# Patient Record
Sex: MALE | Race: BLACK OR AFRICAN AMERICAN | Employment: FULL TIME | URBAN - METROPOLITAN AREA
[De-identification: names, ages, dates, MRNs, and addresses within clinical notes are randomized per-mention and may not be internally consistent; named-entity substitution may affect disease eponyms.]

---

## 2017-11-09 ENCOUNTER — APPOINTMENT (OUTPATIENT)
Dept: GENERAL RADIOLOGY | Age: 37
End: 2017-11-09
Attending: NURSE PRACTITIONER
Payer: COMMERCIAL

## 2017-11-09 ENCOUNTER — HOSPITAL ENCOUNTER (EMERGENCY)
Age: 37
Discharge: HOME | End: 2017-11-09
Attending: EMERGENCY MEDICINE
Payer: COMMERCIAL

## 2017-11-09 VITALS
WEIGHT: 200 LBS | OXYGEN SATURATION: 97 % | TEMPERATURE: 97.6 F | DIASTOLIC BLOOD PRESSURE: 58 MMHG | HEART RATE: 59 BPM | SYSTOLIC BLOOD PRESSURE: 106 MMHG | HEIGHT: 76 IN | RESPIRATION RATE: 16 BRPM | BODY MASS INDEX: 24.36 KG/M2

## 2017-11-09 DIAGNOSIS — S62.654A CLOSED NONDISPLACED FRACTURE OF MIDDLE PHALANX OF RIGHT RING FINGER, INITIAL ENCOUNTER: Primary | ICD-10-CM

## 2017-11-09 PROCEDURE — 77030008303 HC SPLNT FNGR ALUM CONC -A

## 2017-11-09 PROCEDURE — 99282 EMERGENCY DEPT VISIT SF MDM: CPT | Performed by: EMERGENCY MEDICINE

## 2017-11-09 PROCEDURE — 73130 X-RAY EXAM OF HAND: CPT

## 2017-11-09 PROCEDURE — 75810000053 HC SPLINT APPLICATION: Performed by: EMERGENCY MEDICINE

## 2017-11-09 RX ORDER — TRAMADOL HYDROCHLORIDE 50 MG/1
50 TABLET ORAL
Qty: 24 TAB | Refills: 0 | Status: SHIPPED | OUTPATIENT
Start: 2017-11-09 | End: 2021-03-23 | Stop reason: ALTCHOICE

## 2017-11-09 NOTE — DISCHARGE INSTRUCTIONS
Finger Fracture: Care Instructions  Your Care Instructions    Breaks in the bones of the finger usually heal well in about 3 to 4 weeks. The pain and swelling from a broken finger can last for weeks. But it should steadily improve, starting a few days after you break it. It is very important that you wear and take care of the cast or splint exactly as your doctor tells you to so that your finger heals properly and does not end up crooked. Wearing a splint may interfere with your normal activities. Ask for help with daily tasks if you need it. You heal best when you take good care of yourself. Eat a variety of healthy foods, and don't smoke. Follow-up care is a key part of your treatment and safety. Be sure to make and go to all appointments, and call your doctor if you are having problems. It's also a good idea to know your test results and keep a list of the medicines you take. How can you care for yourself at home? · If your doctor put a splint on your finger, wear the splint exactly as directed. Do not remove it until your doctor says that you can. · Keep your hand raised above the level of your heart as much as you can. This will help reduce swelling. · Put ice or a cold pack on your finger for 10 to 20 minutes at a time. Try to do this every 1 to 2 hours for the next 3 days (when you are awake) or until the swelling goes down. Put a thin cloth between the ice and your skin. Keep the splint dry. · Be safe with medicines. Take pain medicines exactly as directed. ¨ If the doctor gave you a prescription medicine for pain, take it as prescribed. ¨ If you are not taking a prescription pain medicine, ask your doctor if you can take an over-the-counter medicine. When should you call for help? Call 911 anytime you think you may need emergency care. For example, call if:  ? · Your finger is cool or pale or changes color.    ?Call your doctor now or seek immediate medical care if:  ? · Your pain gets much worse.   ? · You have tingling, weakness, or numbness in your finger. ? · You have signs of infection, such as:  ¨ Increased pain, swelling, warmth, or redness. ¨ Red streaks leading from the area. ¨ Pus draining from the area. ¨ Swollen lymph nodes in your neck, armpits, or groin. ¨ A fever. ? Watch closely for changes in your health, and be sure to contact your doctor if:  ? · Your finger is not steadily improving. Where can you learn more? Go to http://jaswinder-desi.info/. Enter M058 in the search box to learn more about \"Finger Fracture: Care Instructions. \"  Current as of: March 21, 2017  Content Version: 11.4  © 7765-1844 [employer]. Care instructions adapted under license by [employer] (which disclaims liability or warranty for this information). If you have questions about a medical condition or this instruction, always ask your healthcare professional. [place] [address] any warranty or liability for your use of this information.

## 2017-11-09 NOTE — LETTER
[facility] EMERGENCY DEPT 
[facility] [address] [place] [id number] 
[phone] Work/School Note Date: 11/9/2017 To Whom It May concern: 
 
Otoniel Cochran was seen and treated today in the emergency room by the following provider(s): 
Attending Provider: Stefany Zaman MD 
Nurse Practitioner: Jacy Cr NP. Otoniel Cochran may return to work on tomorrow. Sincerely, Jacy Cr NP

## 2017-11-09 NOTE — ED PROVIDER NOTES
HPI Comments: 41 y/o m to ed with right 4th finger swelling and pain. Says he dropped a box of rocks on it about one week ago, then he had several other injuries that he will not disclose. Now with continued swelling and pain. Maintains full rom, cft intact. No other complaints    Patient is a 42 y.o. male presenting with finger swelling. The history is provided by the patient. No  was used. Finger Swelling    This is a recurrent problem. The current episode started more than 1 week ago. The problem occurs constantly. The problem has not changed since onset. The pain is present in the right fingers. The quality of the pain is described as aching. The pain is mild. Pertinent negatives include no numbness, full range of motion, no stiffness, no tingling, no itching, no back pain and no neck pain. There has been a history of trauma. Past Medical History:   Diagnosis Date    Chlamydia     Hiatal hernia     High risk sexual behavior     Urethritis        Past Surgical History:   Procedure Laterality Date    HX HERNIA REPAIR           Family History:   Problem Relation Age of Onset    Diabetes Mother     Hypertension Mother    [facility] Glaucoma Mother        Social History     Social History    Marital status: SINGLE     Spouse name: N/A    Number of children: N/A    Years of education: N/A     Occupational History    Not on file. Social History Main Topics    Smoking status: Current Some Day Smoker    Smokeless tobacco: Never Used    Alcohol use No    Drug use: No    Sexual activity: Not on file     Other Topics Concern    Not on file     Social History Narrative         ALLERGIES: Review of patient's allergies indicates no known allergies. Review of Systems   Constitutional: Negative for chills and fever. HENT: Negative for facial swelling and mouth sores. Eyes: Negative for discharge and redness. Respiratory: Negative for cough and shortness of breath. Cardiovascular: Negative for chest pain and palpitations. Gastrointestinal: Negative for nausea and vomiting. Endocrine: Negative for cold intolerance and heat intolerance. Genitourinary: Negative for difficulty urinating and flank pain. Musculoskeletal: Positive for joint swelling and myalgias. Negative for back pain, gait problem, neck pain and stiffness. Skin: Negative for color change, itching, pallor and wound. Neurological: Negative for dizziness, tingling, weakness and numbness. Psychiatric/Behavioral: Negative for confusion and decreased concentration. Vitals:    11/09/17 1406 11/09/17 1415   BP: 106/58    Pulse: (!) 59    Resp: 16    Temp: 97.6 °F (36.4 °C)    SpO2: 97% 97%   Weight: 90.7 kg (200 lb)    Height: 6' 4\" (1.93 m)             Physical Exam   Constitutional: He is oriented to person, place, and time. He appears well-developed and well-nourished. No distress. HENT:   Head: Normocephalic and atraumatic. Right Ear: External ear normal.   Left Ear: External ear normal.   Nose: Nose normal.   Eyes: Conjunctivae and EOM are normal. Pupils are equal, round, and reactive to light. Neck: Normal range of motion. Neck supple. Cardiovascular: Normal rate, regular rhythm and normal heart sounds. Pulmonary/Chest: Effort normal and breath sounds normal. No respiratory distress. He has no wheezes. Abdominal: Soft. Bowel sounds are normal. He exhibits no distension. There is no tenderness. Musculoskeletal: Normal range of motion. He exhibits edema and tenderness. Hands:  Neurological: He is alert and oriented to person, place, and time. No cranial nerve deficit. Coordination normal.   Skin: Skin is warm and dry. No rash noted. Psychiatric: He has a normal mood and affect. His behavior is normal. Judgment and thought content normal.   Nursing note and vitals reviewed.        MDM  Number of Diagnoses or Management Options  Diagnosis management comments: 41 y/o m to ed with right 4th finger swelling and pain. Says he dropped a box of rocks on it about one week ago, then he had several other injuries that he will not disclose. Now with continued swelling and pain. Maintains full rom, cft intact. No other complaints    Will xray  2:45 PM  fsx noted middle phalanx of fourth finger.   Will splint, home with family to follow with ortho       Amount and/or Complexity of Data Reviewed  Tests in the radiology section of CPT®: ordered and reviewed    Risk of Complications, Morbidity, and/or Mortality  Presenting problems: minimal  Diagnostic procedures: minimal  Management options: minimal    Patient Progress  Patient progress: stable    ED Course       Procedures

## 2018-12-13 ENCOUNTER — ANESTHESIA EVENT (OUTPATIENT)
Dept: SURGERY | Age: 38
End: 2018-12-13
Payer: OTHER MISCELLANEOUS

## 2018-12-14 ENCOUNTER — ANESTHESIA (OUTPATIENT)
Dept: SURGERY | Age: 38
End: 2018-12-14
Payer: OTHER MISCELLANEOUS

## 2018-12-14 ENCOUNTER — HOSPITAL ENCOUNTER (OUTPATIENT)
Age: 38
Setting detail: OUTPATIENT SURGERY
Discharge: HOME | End: 2018-12-14
Attending: ORTHOPAEDIC SURGERY | Admitting: ORTHOPAEDIC SURGERY
Payer: OTHER MISCELLANEOUS

## 2018-12-14 VITALS
DIASTOLIC BLOOD PRESSURE: 82 MMHG | RESPIRATION RATE: 16 BRPM | HEART RATE: 68 BPM | TEMPERATURE: 97.6 F | SYSTOLIC BLOOD PRESSURE: 148 MMHG | OXYGEN SATURATION: 98 %

## 2018-12-14 PROCEDURE — 74011250636 HC RX REV CODE- 250/636

## 2018-12-14 PROCEDURE — C1713 ANCHOR/SCREW BN/BN,TIS/BN: HCPCS | Performed by: ORTHOPAEDIC SURGERY

## 2018-12-14 PROCEDURE — 77030004453 HC BUR SHV STRY -B: Performed by: ORTHOPAEDIC SURGERY

## 2018-12-14 PROCEDURE — 76210000020 HC REC RM PH II FIRST 0.5 HR: Performed by: ORTHOPAEDIC SURGERY

## 2018-12-14 PROCEDURE — 76060000033 HC ANESTHESIA 1 TO 1.5 HR: Performed by: ORTHOPAEDIC SURGERY

## 2018-12-14 PROCEDURE — 74011250637 HC RX REV CODE- 250/637: Performed by: ANESTHESIOLOGY

## 2018-12-14 PROCEDURE — 76010000160 HC OR TIME 0.5 TO 1 HR INTENSV-TIER 1: Performed by: ORTHOPAEDIC SURGERY

## 2018-12-14 PROCEDURE — 77030019605: Performed by: ORTHOPAEDIC SURGERY

## 2018-12-14 PROCEDURE — 77030002933 HC SUT MCRYL J&J -A: Performed by: ORTHOPAEDIC SURGERY

## 2018-12-14 PROCEDURE — 77030003666 HC NDL SPINAL BD -A: Performed by: ORTHOPAEDIC SURGERY

## 2018-12-14 PROCEDURE — 77030010427: Performed by: ORTHOPAEDIC SURGERY

## 2018-12-14 PROCEDURE — 77030010509 HC AIRWY LMA MSK TELE -A: Performed by: ANESTHESIOLOGY

## 2018-12-14 PROCEDURE — 76210000063 HC OR PH I REC FIRST 0.5 HR: Performed by: ORTHOPAEDIC SURGERY

## 2018-12-14 PROCEDURE — 74011250636 HC RX REV CODE- 250/636: Performed by: ANESTHESIOLOGY

## 2018-12-14 PROCEDURE — 76010010054 HC POST OP PAIN BLOCK: Performed by: ORTHOPAEDIC SURGERY

## 2018-12-14 PROCEDURE — 77030027384 HC PRB ARTHSCP SERFAS STRY -C: Performed by: ORTHOPAEDIC SURGERY

## 2018-12-14 PROCEDURE — 77030034139 HC NDL ENDOSC TRUPASS SGL S&N -C: Performed by: ORTHOPAEDIC SURGERY

## 2018-12-14 PROCEDURE — 77030033269 HC SLV COMPR SCD KNE2 CARD -B: Performed by: ORTHOPAEDIC SURGERY

## 2018-12-14 PROCEDURE — 77030033005 HC TBNG ARTHSC PMP STRY -B: Performed by: ORTHOPAEDIC SURGERY

## 2018-12-14 PROCEDURE — 76942 ECHO GUIDE FOR BIOPSY: CPT | Performed by: ORTHOPAEDIC SURGERY

## 2018-12-14 PROCEDURE — 77030010430: Performed by: ORTHOPAEDIC SURGERY

## 2018-12-14 PROCEDURE — 77030019940 HC BLNKT HYPOTHRM STRY -B: Performed by: ANESTHESIOLOGY

## 2018-12-14 PROCEDURE — 77030018836 HC SOL IRR NACL ICUM -A: Performed by: ORTHOPAEDIC SURGERY

## 2018-12-14 PROCEDURE — 74011250636 HC RX REV CODE- 250/636: Performed by: PHYSICIAN ASSISTANT

## 2018-12-14 PROCEDURE — 74011000250 HC RX REV CODE- 250

## 2018-12-14 PROCEDURE — 77030033073 HC TBNG ARTHSC PMP OUTFLO STRY -B: Performed by: ORTHOPAEDIC SURGERY

## 2018-12-14 PROCEDURE — 77030019908 HC STETH ESOPH SIMS -A: Performed by: ANESTHESIOLOGY

## 2018-12-14 PROCEDURE — 77030006668 HC BLD SHV MENSCS STRY -B: Performed by: ORTHOPAEDIC SURGERY

## 2018-12-14 PROCEDURE — 77030018673: Performed by: ORTHOPAEDIC SURGERY

## 2018-12-14 PROCEDURE — 74011250636 HC RX REV CODE- 250/636: Performed by: ORTHOPAEDIC SURGERY

## 2018-12-14 DEVICE — MULTIFIX S-ULTRA 5.5MM KNOTLESS ANCHOR
Type: IMPLANTABLE DEVICE | Site: SHOULDER | Status: FUNCTIONAL
Brand: MULTIFIX

## 2018-12-14 RX ORDER — LIDOCAINE HYDROCHLORIDE 20 MG/ML
INJECTION, SOLUTION EPIDURAL; INFILTRATION; INTRACAUDAL; PERINEURAL
Status: COMPLETED | OUTPATIENT
Start: 2018-12-14 | End: 2018-12-14

## 2018-12-14 RX ORDER — SODIUM CHLORIDE 0.9 % (FLUSH) 0.9 %
5-10 SYRINGE (ML) INJECTION AS NEEDED
Status: DISCONTINUED | OUTPATIENT
Start: 2018-12-14 | End: 2018-12-14 | Stop reason: HOSPADM

## 2018-12-14 RX ORDER — SODIUM CHLORIDE, SODIUM LACTATE, POTASSIUM CHLORIDE, CALCIUM CHLORIDE 600; 310; 30; 20 MG/100ML; MG/100ML; MG/100ML; MG/100ML
100 INJECTION, SOLUTION INTRAVENOUS CONTINUOUS
Status: DISCONTINUED | OUTPATIENT
Start: 2018-12-14 | End: 2018-12-14 | Stop reason: HOSPADM

## 2018-12-14 RX ORDER — PROPOFOL 10 MG/ML
INJECTION, EMULSION INTRAVENOUS AS NEEDED
Status: DISCONTINUED | OUTPATIENT
Start: 2018-12-14 | End: 2018-12-14 | Stop reason: HOSPADM

## 2018-12-14 RX ORDER — SODIUM CHLORIDE 9 MG/ML
50 INJECTION, SOLUTION INTRAVENOUS CONTINUOUS
Status: DISCONTINUED | OUTPATIENT
Start: 2018-12-14 | End: 2018-12-14 | Stop reason: HOSPADM

## 2018-12-14 RX ORDER — DEXAMETHASONE SODIUM PHOSPHATE 4 MG/ML
INJECTION, SOLUTION INTRA-ARTICULAR; INTRALESIONAL; INTRAMUSCULAR; INTRAVENOUS; SOFT TISSUE AS NEEDED
Status: DISCONTINUED | OUTPATIENT
Start: 2018-12-14 | End: 2018-12-14 | Stop reason: HOSPADM

## 2018-12-14 RX ORDER — BUPIVACAINE HYDROCHLORIDE AND EPINEPHRINE 5; 5 MG/ML; UG/ML
INJECTION, SOLUTION EPIDURAL; INTRACAUDAL; PERINEURAL
Status: COMPLETED | OUTPATIENT
Start: 2018-12-14 | End: 2018-12-14

## 2018-12-14 RX ORDER — EPINEPHRINE 1 MG/ML
INJECTION INTRAMUSCULAR; INTRAVENOUS; SUBCUTANEOUS AS NEEDED
Status: DISCONTINUED | OUTPATIENT
Start: 2018-12-14 | End: 2018-12-14 | Stop reason: HOSPADM

## 2018-12-14 RX ORDER — CEFAZOLIN SODIUM/WATER 2 G/20 ML
2 SYRINGE (ML) INTRAVENOUS ONCE
Status: COMPLETED | OUTPATIENT
Start: 2018-12-14 | End: 2018-12-14

## 2018-12-14 RX ORDER — ONDANSETRON 2 MG/ML
INJECTION INTRAMUSCULAR; INTRAVENOUS AS NEEDED
Status: DISCONTINUED | OUTPATIENT
Start: 2018-12-14 | End: 2018-12-14 | Stop reason: HOSPADM

## 2018-12-14 RX ORDER — FENTANYL CITRATE 50 UG/ML
25 INJECTION, SOLUTION INTRAMUSCULAR; INTRAVENOUS ONCE
Status: COMPLETED | OUTPATIENT
Start: 2018-12-14 | End: 2018-12-14

## 2018-12-14 RX ORDER — MIDAZOLAM HYDROCHLORIDE 1 MG/ML
2 INJECTION, SOLUTION INTRAMUSCULAR; INTRAVENOUS ONCE
Status: COMPLETED | OUTPATIENT
Start: 2018-12-14 | End: 2018-12-14

## 2018-12-14 RX ORDER — OXYCODONE HYDROCHLORIDE 5 MG/1
5 TABLET ORAL
Status: DISCONTINUED | OUTPATIENT
Start: 2018-12-14 | End: 2018-12-14 | Stop reason: HOSPADM

## 2018-12-14 RX ORDER — LIDOCAINE HYDROCHLORIDE 10 MG/ML
0.1 INJECTION INFILTRATION; PERINEURAL AS NEEDED
Status: DISCONTINUED | OUTPATIENT
Start: 2018-12-14 | End: 2018-12-14 | Stop reason: HOSPADM

## 2018-12-14 RX ORDER — HYDROMORPHONE HYDROCHLORIDE 2 MG/ML
0.5 INJECTION, SOLUTION INTRAMUSCULAR; INTRAVENOUS; SUBCUTANEOUS
Status: DISCONTINUED | OUTPATIENT
Start: 2018-12-14 | End: 2018-12-14 | Stop reason: HOSPADM

## 2018-12-14 RX ORDER — LIDOCAINE HYDROCHLORIDE 20 MG/ML
INJECTION, SOLUTION EPIDURAL; INFILTRATION; INTRACAUDAL; PERINEURAL AS NEEDED
Status: DISCONTINUED | OUTPATIENT
Start: 2018-12-14 | End: 2018-12-14 | Stop reason: HOSPADM

## 2018-12-14 RX ORDER — OXYCODONE AND ACETAMINOPHEN 5; 325 MG/1; MG/1
1 TABLET ORAL AS NEEDED
Status: DISCONTINUED | OUTPATIENT
Start: 2018-12-14 | End: 2018-12-14 | Stop reason: HOSPADM

## 2018-12-14 RX ORDER — FAMOTIDINE 20 MG/1
20 TABLET, FILM COATED ORAL ONCE
Status: COMPLETED | OUTPATIENT
Start: 2018-12-14 | End: 2018-12-14

## 2018-12-14 RX ORDER — DIPHENHYDRAMINE HYDROCHLORIDE 50 MG/ML
12.5 INJECTION, SOLUTION INTRAMUSCULAR; INTRAVENOUS ONCE
Status: DISCONTINUED | OUTPATIENT
Start: 2018-12-14 | End: 2018-12-14 | Stop reason: HOSPADM

## 2018-12-14 RX ORDER — SODIUM CHLORIDE 0.9 % (FLUSH) 0.9 %
5-10 SYRINGE (ML) INJECTION EVERY 8 HOURS
Status: DISCONTINUED | OUTPATIENT
Start: 2018-12-14 | End: 2018-12-14 | Stop reason: HOSPADM

## 2018-12-14 RX ORDER — BUPIVACAINE HYDROCHLORIDE AND EPINEPHRINE 2.5; 5 MG/ML; UG/ML
INJECTION, SOLUTION EPIDURAL; INFILTRATION; INTRACAUDAL; PERINEURAL
Status: COMPLETED | OUTPATIENT
Start: 2018-12-14 | End: 2018-12-14

## 2018-12-14 RX ORDER — MIDAZOLAM HYDROCHLORIDE 1 MG/ML
2 INJECTION, SOLUTION INTRAMUSCULAR; INTRAVENOUS
Status: DISCONTINUED | OUTPATIENT
Start: 2018-12-14 | End: 2018-12-14 | Stop reason: HOSPADM

## 2018-12-14 RX ADMIN — DEXAMETHASONE SODIUM PHOSPHATE 8 MG: 4 INJECTION, SOLUTION INTRA-ARTICULAR; INTRALESIONAL; INTRAMUSCULAR; INTRAVENOUS; SOFT TISSUE at 09:15

## 2018-12-14 RX ADMIN — PROPOFOL 10 MG: 10 INJECTION, EMULSION INTRAVENOUS at 09:37

## 2018-12-14 RX ADMIN — BUPIVACAINE HYDROCHLORIDE AND EPINEPHRINE 15 ML: 5; 5 INJECTION, SOLUTION EPIDURAL; INTRACAUDAL; PERINEURAL at 08:03

## 2018-12-14 RX ADMIN — PROPOFOL 10 MG: 10 INJECTION, EMULSION INTRAVENOUS at 09:47

## 2018-12-14 RX ADMIN — PROPOFOL 10 MG: 10 INJECTION, EMULSION INTRAVENOUS at 09:39

## 2018-12-14 RX ADMIN — SODIUM CHLORIDE, SODIUM LACTATE, POTASSIUM CHLORIDE, AND CALCIUM CHLORIDE 100 ML/HR: 600; 310; 30; 20 INJECTION, SOLUTION INTRAVENOUS at 07:53

## 2018-12-14 RX ADMIN — MIDAZOLAM HYDROCHLORIDE 2 MG: 2 INJECTION, SOLUTION INTRAMUSCULAR; INTRAVENOUS at 07:59

## 2018-12-14 RX ADMIN — BUPIVACAINE HYDROCHLORIDE AND EPINEPHRINE 10 ML: 2.5; 5 INJECTION, SOLUTION EPIDURAL; INFILTRATION; INTRACAUDAL; PERINEURAL at 08:03

## 2018-12-14 RX ADMIN — PROPOFOL 10 MG: 10 INJECTION, EMULSION INTRAVENOUS at 09:35

## 2018-12-14 RX ADMIN — PROPOFOL 10 MG: 10 INJECTION, EMULSION INTRAVENOUS at 09:43

## 2018-12-14 RX ADMIN — PROPOFOL 10 MG: 10 INJECTION, EMULSION INTRAVENOUS at 09:41

## 2018-12-14 RX ADMIN — Medication 2 G: at 09:10

## 2018-12-14 RX ADMIN — PROPOFOL 10 MG: 10 INJECTION, EMULSION INTRAVENOUS at 09:45

## 2018-12-14 RX ADMIN — ONDANSETRON 4 MG: 2 INJECTION INTRAMUSCULAR; INTRAVENOUS at 09:28

## 2018-12-14 RX ADMIN — FAMOTIDINE 20 MG: 20 TABLET ORAL at 07:54

## 2018-12-14 RX ADMIN — FENTANYL CITRATE 25 MCG: 50 INJECTION INTRAMUSCULAR; INTRAVENOUS at 07:59

## 2018-12-14 RX ADMIN — LIDOCAINE HYDROCHLORIDE 100 MG: 20 INJECTION, SOLUTION EPIDURAL; INFILTRATION; INTRACAUDAL; PERINEURAL at 09:06

## 2018-12-14 RX ADMIN — LIDOCAINE HYDROCHLORIDE 10 ML: 20 INJECTION, SOLUTION EPIDURAL; INFILTRATION; INTRACAUDAL; PERINEURAL at 08:03

## 2018-12-14 RX ADMIN — PROPOFOL 30 MG: 10 INJECTION, EMULSION INTRAVENOUS at 07:59

## 2018-12-14 RX ADMIN — PROPOFOL 250 MG: 10 INJECTION, EMULSION INTRAVENOUS at 09:06

## 2018-12-14 NOTE — ANESTHESIA PROCEDURE NOTES
Peripheral Block    Start time: 12/14/2018 7:59 AM  End time: 12/14/2018 8:03 AM  Performed by: Dio Herbert MD  Authorized by: Dio Herbert MD       Pre-procedure: Indications: at surgeon's request and post-op pain management    Preanesthetic Checklist: patient identified, risks and benefits discussed, site marked, timeout performed, anesthesia consent given and patient being monitored    Timeout Time: 07:59          Block Type:   Block Type:   Interscalene  Laterality:  Right  Monitoring:  Standard ASA monitoring, continuous pulse ox, frequent vital sign checks, heart rate and oxygen  Injection Technique:  Single shot  Procedures: ultrasound guided and nerve stimulator    Patient Position: supine  Prep: chlorhexidine    Location:  Interscalene  Needle Type:  Stimuplex  Needle Gauge:  22 G  Needle Localization:  Nerve stimulator and ultrasound guidance  Motor Response: minimal motor response >0.4 mA      Assessment:  Number of attempts:  1  Injection Assessment:  Local visualized surrounding nerve on ultrasound, negative aspiration for CSF, negative aspiration for blood, no paresthesia, no intravascular symptoms, ultrasound image on chart and incremental injection every 5 mL  Patient tolerance:  Patient tolerated the procedure well with no immediate complications

## 2018-12-14 NOTE — H&P
Outpatient Surgery History and Physical:  Rosalia Vaca was seen and examined. CHIEF COMPLAINT:   Right shoulder pain. PE:     Visit Vitals  /69 (BP 1 Location: Left arm)   Pulse (!) 54   Temp 98.4 °F (36.9 °C)   Resp 18   SpO2 96%       Heart:   Regular rhythm      Lungs:  Are clear      Past Medical History: There are no active problems to display for this patient. Surgical History:   Past Surgical History:   Procedure Laterality Date    HX HERNIA REPAIR      HX WISDOM TEETH EXTRACTION         Social History: Patient  reports that he has been smoking. He has been smoking about 1.00 pack per day. he has never used smokeless tobacco. He reports that he does not drink alcohol or use drugs. Family History:   Family History   Problem Relation Age of Onset    Diabetes Mother     Hypertension Mother     Glaucoma Mother        Allergies: Reviewed per EMR  No Known Allergies    Medications:    No current facility-administered medications on file prior to encounter. Current Outpatient Medications on File Prior to Encounter   Medication Sig    meloxicam (MOBIC) 15 mg tablet Take 15 mg by mouth daily.  traMADol (ULTRAM) 50 mg tablet Take 1 Tab by mouth every six (6) hours as needed for Pain. Max Daily Amount: 200 mg. The surgery is planned for the right shoulder arthroscopy rotator cuff repair. History and physical has been reviewed. The patient has been examined. There have been no significant clinical changes since the completion of the originally dated History and Physical.  Patient identified by surgeon; surgical site was confirmed by patient and surgeon. The patient is here today for outpatient surgery. I have examined the patient, no changes are noted in the patient's medical status. Necessity for the procedure/care is still present and the history and physical above is current. See the office notes for the full long term history of the problem.   Please see the recent office notes for the musculoskeletal examination.     Signed By: MAIN Robins     December 14, 2018 7:53 AM

## 2018-12-14 NOTE — ANESTHESIA PREPROCEDURE EVALUATION
Anesthetic History   No history of anesthetic complications            Review of Systems / Medical History  Patient summary reviewed and pertinent labs reviewed    Pulmonary  Within defined limits                 Neuro/Psych   Within defined limits           Cardiovascular                  Exercise tolerance: >4 METS     GI/Hepatic/Renal           Hiatal hernia     Endo/Other  Within defined limits           Other Findings              Physical Exam    Airway  Mallampati: II  TM Distance: 4 - 6 cm  Neck ROM: normal range of motion   Mouth opening: Normal     Cardiovascular  Regular rate and rhythm,  S1 and S2 normal,  no murmur, click, rub, or gallop  Rhythm: regular  Rate: normal         Dental  No notable dental hx       Pulmonary  Breath sounds clear to auscultation               Abdominal  GI exam deferred       Other Findings            Anesthetic Plan    ASA: 2  Anesthesia type: general      Post-op pain plan if not by surgeon: peripheral nerve block single    Induction: Intravenous  Anesthetic plan and risks discussed with: Patient

## 2018-12-14 NOTE — PROGRESS NOTES
Spiritual Care visit. Initial Visit, Pre Surgery Consult. Visit and prayer before patient goes to surgery.     Visit by Maicol Bennett M.Ed., Th.B. ,Staff

## 2018-12-14 NOTE — OP NOTES
[facility] REPORT    Liz Lauren  MR#: [id number]  : 1980  ACCOUNT #: [de-identified]   DATE OF SERVICE: 2018    PREOPERATIVE DIAGNOSES:  1. Rotator cuff tear. 2.  Acromioclavicular arthritis. 3.  Anterior and superior labral tearing. 4.  Impingement, right shoulder. POSTOPERATIVE DIAGNOSES:   1. Rotator cuff tear. 2.  Acromioclavicular arthritis. 3.  Anterior and superior labral tearing. 4.  Impingement, right shoulder. PROCEDURES PERFORMED:  1. Arthroscopic rotator cuff repair. 2.  Arthroscopic distal clavicle excision (Nura procedure). 3.  Debridement of anterior and superior labral tearing. 4.  Arthroscopic subacromial decompression, right shoulder. SURGEON:  Madhu Borden MD    ASSISTANT:  MAIN Velásquez    ANESTHESIA:  General.    FLUIDS:  Crystalloid. ESTIMATED BLOOD LOSS:  Minimal.    SPECIMENS REMOVED:  None. COMPLICATIONS:  None. IMPLANTS:  Yes, see records. FINDINGS:  There was a high-grade partial tear encompassing approximately 80% of the thickness of the tendon in a 10-12 mm area. There was some anterior and superior labral tearing, but the labrum itself was stable at its attachment. The biceps and glenohumeral articular surfaces were intact. There was an anterior-inferior acromial slope. There was undersurface osteophyte formation and degenerative changes of the distal clavicle at the [facility] joint. DESCRIPTION OF PROCEDURE:  After informed consent, the patient was brought to the operating room and placed on the table in supine position. General endotracheal anesthesia was administered without difficulty. Patient was placed in lateral decubitus position. All pressure points were inspected and padded appropriately. The right upper extremity was suspended in overhead traction. Right shoulder was prepped and draped in sterile fashion.   Glenohumeral joint was insufflated with 50 mL of sterile saline and a posterior portal was established. Glenohumeral joint was then arthroscoped in sequential manner. The aforementioned findings were noted. An anterior portal was established. Labral tearing was debrided smooth. All loose flaps of tissue were removed. There were no sharp edges or unstable fragments after labral debridement. Undersurface rotator cuff tearing was debrided back to a smooth stable area. The scope was brought in the subacromial space. A lateral portal was established. Bursal proliferative tissue was excised. The undersurface of the acromion was exposed and an acromioplasty was performed. All of the acromion anterior to the leading edge of the distal clavicle was excised a depth of 5-6 mm and 2 cm anterior to posterior direction was removed. This opened up the subacromial space nicely. Attention was then directed to the distal clavicle. Circumferential distal clavicle excision was performed. Approximately 10 mm of distal clavicle was excised. Circumferential excision was verified arthroscopically. The Nura procedure was performed without difficulty. Attention was then directed to the rotator cuff. The high-grade partial tear was converted to a full-thickness tear. The area underneath the original insertion was lightly decorticated. A Multifix anchor and 2 Ultratape sutures in mattress fashion were used to complete repair of the tear. Anatomic rotator cuff repair was performed. The rotator cuff tear was repaired without difficulty. Shoulder was thoroughly irrigated. Portals were closed. Sterile dressings were applied. Patient was extubated and taken to the recovery room in stable condition. MAIN Quiroz, assisted during the procedure. He was necessary for patient positioning, wound closure, and assistance with the major portions of the operation including the rotator cuff repair.   His presence decreased the operative time and potential complication rate.      MD SHEREEN Stephen / SAWYER  D: 12/14/2018 10:06     T: 12/14/2018 10:33  JOB #: [id number]

## 2018-12-14 NOTE — BRIEF OP NOTE
BRIEF OPERATIVE NOTE    Date of Procedure: 12/14/2018   Preoperative Diagnosis: Incomplete tear of right rotator cuff [M75.111]  Impingement syndrome of right shoulder [M75.41]  Postoperative Diagnosis: Right shoulder rotator cuff repair, Impingemnet syndrome    Procedure(s):  RIGHT SHOULDER ARTHROSCOPY  ROTATOR CUFF /SUBACROMIAL DECOMPRESSION  Surgeon(s) and Role:     * Stefani Brar MD - Primary         Surgical Assistant:     Surgical Staff:  Circ-1: Pool Baig RN  Circ-Relief: Kendra Powers RN  Physician Assistant: MAIN Mccarthy  Scrub Tech-1: Thiago Cohen  Event Time In Time Out   Incision Start 7702    Incision Close 5787      Anesthesia: General   Estimated Blood Loss: min  Specimens: * No specimens in log *   Findings: rtc   Complications: none  Implants:   Implant Name Type Inv.  Item Serial No.  Lot No. LRB No. Used Action   ANCHOR SUT ULTRA PEEK KL 5.5MM -- Glorianne Pryor - [id number]  ANCHOR SUT ULTRA PEEK KL 5.5MM -- [facility] How [employer] [id number] Right 1 Implanted

## 2018-12-14 NOTE — DISCHARGE INSTRUCTIONS
Post-Operative Instructions   For  Shoulder Arthroscopy Rotator Cuff Repair  Phone:  [phone]    1. If you do not have an \"Iceman\" type cooling unit, for the first 48-72 hours following surgery, use ice on the shoulder every two hours (while awake) for 20-30 minutes at a time to help prevent swelling and lessen pain. If you have a cooling unit, follow the instructions given to you- continually as much as possible the first 48-72 hours, then 3-4 times a day for 4 weeks. 2. You may shower after the arthroscopy. Keep dressings in place for the first three days. After three days, you may remove the dressings and leave the steri-strip bandages in place; they will peel off naturally. 3. Stay in sling at all times except to shower. 4. Begin therapy as ordered. 5. Use any pain medication as instructed. You should take your pain medication as soon as you feel the anesthetic wearing off. Do not wait until you are in severe pain to begin taking your pain medication. 6. You may have some side effects from your pain medication. If you have nausea, try taking your medication with food. For itching, you may take over the counter Benadryl. 7. You may have been given a prescription for Zofran or Phenergan. This medication is used for nausea and vomiting. You do not need to get this prescription filled unless you have a problem. 8. If you have a problem, please call [address] [place] [address] at [phone]    [address] [place] [address], P.A.

## 2018-12-14 NOTE — ANESTHESIA POSTPROCEDURE EVALUATION
Procedure(s):  RIGHT SHOULDER ARTHROSCOPY  ROTATOR CUFF /SUBACROMIAL DECOMPRESSION.     Anesthesia Post Evaluation      Multimodal analgesia: multimodal analgesia used between 6 hours prior to anesthesia start to PACU discharge  Patient location during evaluation: bedside  Patient participation: complete - patient participated  Level of consciousness: awake and awake and alert  Pain management: adequate  Airway patency: patent  Anesthetic complications: no  Cardiovascular status: acceptable and stable  Respiratory status: acceptable and room air  Hydration status: acceptable  Post anesthesia nausea and vomiting:  none      Visit Vitals  /83   Pulse 66   Temp 36.4 °C (97.6 °F)   Resp 15   SpO2 98%

## 2018-12-21 ENCOUNTER — HOSPITAL ENCOUNTER (OUTPATIENT)
Dept: PHYSICAL THERAPY | Age: 38
Discharge: HOME | End: 2018-12-21
Payer: OTHER MISCELLANEOUS

## 2018-12-21 PROCEDURE — 97110 THERAPEUTIC EXERCISES: CPT

## 2018-12-21 PROCEDURE — 97016 VASOPNEUMATIC DEVICE THERAPY: CPT

## 2018-12-21 PROCEDURE — 97161 PT EVAL LOW COMPLEX 20 MIN: CPT

## 2018-12-21 NOTE — THERAPY EVALUATION
Nickie Rodriguez  : 1980      Payor: Jaswant Akersafshanbambi / Plan: [id number] / Product Type: Workers Comp /    Althia Allis at [address] [place] [address]., [address], [facility], [address] [place] [address]  [phone]   [phone]              OUTPATIENT PHYSICAL THERAPY:Initial Assessment and Daily Note 2018    ICD-10: Treatment Diagnosis:    Pain in right shoulder (M25.511)  Incomplete rotator cuff tear or rupture of right shoulder, not specified as traumatic (M75.111)  Impingement syndrome of right shoulder (M75.41)                Precautions/Allergies:   Patient has no known allergies. Fall Risk Score: 1 (? 5 = High Risk)  MD Orders: Eval and Treat  MEDICAL/REFERRING DIAGNOSIS:  Incomplete rotator cuff tear or rupture of right shoulder, not specified as traumatic [M75.111]  Impingement syndrome of right shoulder [M75.41]   DATE OF ONSET: 18  REFERRING PHYSICIAN: Raad Solano MD  RETURN PHYSICIAN APPOINTMENT: TBD by patient      INITIAL ASSESSMENT:   Mr. Reginald David presents to physical therapy s/p R RTC repair on 18. He presents with decreased strength, posture, ROM, joint mobility, functional mobility. These S/S are consistent with R RTC repair, R shoulder pain and impingement syndrome of R shoulder. Patient will benefit from skilled physical therapy for manual therapeutic techniques (as appropriate), therapeutic exercises and activities, neuromuscular re-education, and comprehensive home exercises program to address current impairments and functional limitations. Nickie Rodriguez will benefit from skilled PT (medically necessary) in order to address above deficits affecting participation in basic ADLs and overall functional tolerance. PROBLEM LIST (Impacting functional limitations):  1. Decreased Strength  2. Decreased ADL/Functional Activities  3. Increased Pain  4.  Decreased Activity Tolerance  5. Increased Shortness of Breath  6. Decreased Flexibility/Joint Mobility  7. Decreased [place] with Home Exercise Program INTERVENTIONS PLANNED:  1. Cold  2. Family Education  3. Heat  4. Home Exercise Program (HEP)  5. Manual Therapy  6. Neuromuscular Re-education/Strengthening  7. Range of Motion (ROM)  8. Therapeutic Activites  9. Vasoneupmatic compression  10. Therapeutic Exercise/Strengthening  11. Transfer Training  12. Ultrasound   TREATMENT PLAN:  Effective Dates: 12/21/18 TO 3/21/2019 (90 days). Frequency/Duration: 2-3 times a week for 90 Days  GOALS: (Goals have been discussed and agreed upon with patient.)  SHORT-TERM FUNCTIONAL GOALS: Time Frame: 4 weeks  1. Josphine Quivers will report <=5/10 pain with don/doffing clothing as well as minimal/no difficulty. 2. Josphine Quivers will demonstrate improvement in passive shoulder flexion to >100 degrees to increase UE function and participation in ADLs. 3. Josphine Quivers will demonstrate demonstrate improvement in passive shoulder abd to >100 degrees to increase UE function and participation in ADLs. 4.  Josphine Quivers will show a greater than 8 point decrease on the DASH in order to show an increase in upper extremity function. [address] will be independent in all HEP    DISCHARGE GOALS: Time Frame: 12 weeks    1. Josphine Quivers will show full AROM of the UE in order to return to full functional mobility   2. Josphine Quivers will show a greater than 15 point decrease on the DASH in order to show an increase in upper extremity function  3. Josphine Quivers will report doing hair/bathing without difficulty and <=2/10 pain in order to be independent with ADL's  4.  Josphine Quivers will be independent in all advanced HEP     Rehabilitation Potential For Stated Goals: Good  Regarding Josphine Quivers therapy, I certify that the treatment plan above will be carried out by a therapist or under their direction. Thank you for this referral,  James Garber     Referring Physician Signature: Collier Duverney, MD              Date                    HISTORY:   History of Present Injury/Illness (Reason for Referral):  Anabell Jack presents s/p R RTC repair on 12/14/18. He states it is a work related injury when boxes fell on his shoulder. 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 Pt reports currently having most difficulty sleeping at night    -Present symptoms/complaints (on day of evaluation)  Pain Scale:  · Current: 10/10  · Best: 10/10  · Worst: 10/10    · Aggravating factors: sleeping, donning/doffing clothing, bathing   · Relieving factors: ice    Dominant Side  Right handed  Past Medical History/Comorbidities:   Mr. Melodie Juarez  has a past medical history of Chlamydia, Hiatal hernia, High risk sexual behavior, and Urethritis. Mr. Melodie Juarez  has a past surgical history that includes hx hernia repair; hx wisdom teeth extraction; and RIGHT SHOULDER ARTHROSCOPY  ROTATOR CUFF /SUBACROMIAL DECOMPRESSION (Right, 12/14/2018). Social History/Living Environment:     Lives with girlfriend   Prior Level of Function/Work/Activity:  Full time at [employer];    Current Medications:    Current Outpatient Medications:     meloxicam (MOBIC) 15 mg tablet, Take 15 mg by mouth daily. , Disp: , Rfl:     traMADol (ULTRAM) 50 mg tablet, Take 1 Tab by mouth every six (6) hours as needed for Pain.  Max Daily Amount: 200 mg., Disp: 24 Tab, Rfl: 0   - Oxycodone (per patient) prn 5mg tablet    Date Last Reviewed:  12/21/2018   Number of Personal Factors/Comorbidities that affect the Plan of Care: 1-2: MODERATE COMPLEXITY   EXAMINATION:   Observation/Orthostatic Postural Assessment:          Rounded shoulders, slouched to the left guarding R shoulder  Palpation:          TTP along R UT, levator scap and cervical paraspinals and around surgical site;  Surgical sites demonstrate good healing with steri strips on and no signs of infection present. ROM:  Not tested secondary to post operative status x1wk per protocol. AROM/PROM         Joint: Eval Date: 12/21/18  Re-Assess Date:  Re-Assess Date:    ACTIVE ROM (standing) RIGHT LEFT RIGHT LEFT RIGHT LEFT   Shoulder Flexion         Shoulder Abduction         Shoulder Internal Rotation (Apley's)         Shoulder External Rotation (Apley's)         Elbow ROM         PASSIVE ROM (supine)         Shoulder Flexion         Shoulder Abduction         Shoulder Internal Rotation         Shoulder External Rotation         Cervical flexion/extension WFL        Cervical Rotation WFL                   Strength:  Not tested secondary to post operative status x1wk per protocol. Joint: Eval Date: 12/21/18  Re-Assess Date:  Re-Assess Date:     RIGHT LEFT RIGHT LEFT RIGHT LEFT   Shoulder Flexion         Shoulder Abduction  (C5)         Shoulder Internal Rotation         Shoulder External Rotation         Elbow Flexion  (C6)         Elbow Extension (C7)         Wrist Flexion (C7)         Wrist Extension (C6)         Resisted Thumb Extension/Finger Abduction (C8/T1)         Resisted Cervical Rotation (C1):         Resisted Shoulder Shrug (C2, 3, 4):           Strength                                      Joint Mobility Eval Date: Not test due to post op status  Re-Assess Date:  Re-Assess Date:     RIGHT LEFT RIGHT LEFT RIGHT LEFT   Glenohumeral         Scapulothoracic         Thoracic              Special Tests:      Neurological Screen: Assessed @ Initial Visit    Radiating symptoms? No  Functional Mobility:  Assessed @ Initial Visit   Balance:  Assessed @ Initial Visit      Body Structures Involved:  1. Bones  2. Joints  3. Muscles  4. Ligaments Body Functions Affected:  1. Sensory/Pain  2. Neuromusculoskeletal  3. Movement Related Activities and Participation Affected:  1. Mobility  2.  Self Care   Number of elements that affect the Plan of Care: 1-2: LOW COMPLEXITY   CLINICAL PRESENTATION:   Presentation: Stable and uncomplicated: LOW COMPLEXITY   CLINICAL DECISION MAKING:   Outcome Measure: Tool Used: Disabilities of the Arm, Shoulder and Hand (DASH) Questionnaire - Quick Version  Score:  Initial: 54/55  Most Recent: X/55 (Date: -- )   Interpretation of Score: The DASH is designed to measure the activities of daily living in person's with upper extremity dysfunction or pain. Each section is scored on a 1-5 scale, 5 representing the greatest disability. The scores of each section are added together for a total score of 55. Score 11 12-19 20-28 29-37 38-45 46-54 55   Modifier CH CI CJ CK CL CM CN     ? Carrying, Moving, and Handling Objects:     - CURRENT STATUS: CM - 80%-99% impaired, limited or restricted    - GOAL STATUS: CI - 1%-19% impaired, limited or restricted    - D/C STATUS:  ---------------To be determined---------------    Medical Necessity:   · Skilled intervention continues to be required due to deficits and impairments seen upon initial evaluation affecting patient's participation in ADLs and functional tasks. ·   Reason for Services/Other Comments:  · Patient continues to require skilled intervention due to deficits and impairments seen upon initial evaluation affecting patient's participation in ADLs and functional tasks. Use of outcome tool(s) and clinical judgement create a POC that gives a: Clear prediction of patient's progress: LOW COMPLEXITY   TREATMENT:   (In addition to Assessment/Re-Assessment sessions the following treatments were rendered)    · Pre-Treatment Pain/ Symptoms: See above report in Initial Assessment   10/10       THERAPEUTIC EXERCISE: (20 minutes):  Exercises per grid below to improve mobility, strength and balance. Required minimal visual and verbal cues to promote proper body alignment, promote proper body posture and promote proper body mechanics.   Progressed resistance, range and repetitions as indicated. Date:  12/21/18 Date:   Date:     Activity/Exercise Parameters Parameters Parameters   Education Healing process, HEP, POC, PT goals, protocol     Scapular retraction 5x5\"      Assisted pendulums 1 min                               [employer] [facility]     Manual Therapy Interventions: (5 minutes): . Joint mobilization, Soft tissue mobilization and Manipulation was utilized and necessary because of the patient's restricted joint motion, painful spasm and restricted motion of soft tissue. Done to improve soft tissue tone and elasticity  as well as promote proper joint movement in order to improve overall movement quality  (Used abbreviations: MET - muscle energy technique; PNF - proprioceptive neuromuscular facilitation; NMR - neuromuscular re-education; AP - anterior to posterior; PA - posterior to anterior)   Patient responded well to all manual interventions listed in chart below with no significant increase in pain/symptoms. Improved ROM demonstrated following interventions and decrease in pain scale listed below. Manual Intervention Date:  12/21/18 Date:   Date:     Soft tissue mobilization Joint Mobility Parameters Parameters Parameters   Cervical paraspinals, UT, levator scap  Strumming/release      upslides(cervical) gradeI-II massage                                          MODALITIES: (10 minutes):        *  Cold Pack Therapy in order to provide analgesia, relieve muscle spasm and reduce inflammation and edema. Vasoneupmatic Compression performed for cold pack therapy today for same reasons listed. Location= R shoulder  Skin intact pre and post treatment with no adverse symptoms    Treatment/Session Assessment: Patient verbalized and demonstrated understanding of HEP. · Post-Treatment Pain/ Symptoms: Good  Pain= 8/10 ·   Compliance with Program/Exercises: Will assess future therapy sessions.    · Recommendations/Intent for next treatment session: \"Next visit will focus on advancements to more challenging activities\".     Future Appointments   Date Time Provider Jade Harley   12/26/2018  8:00 AM Ambreen Stokes PT [facility]   12/28/2018  9:30 AM Lamount Savant N [facility]   1/2/2019  9:30 AM Lamount Savant N [facility]   1/4/2019  8:00 AM Lamount Savant N [facility]   1/8/2019  8:45 AM Sarah Lizzie [facility]   1/10/2019  8:45 AM Lamount Savant N [facility]   1/14/2019  8:00 AM Lamount Savant N [facility]   1/18/2019  8:00 AM Lamount Savant N [facility]   1/22/2019  8:00 AM Lamount Savant N [facility]   1/25/2019  8:00 AM Lamount Savant N [facility]   1/29/2019  8:00 AM Lamount Savant N [facility]   2/1/2019  8:00 AM Sarah Lizzie [facility]       Total Treatment Duration: 50 mins; 15 mins eval, 20 mins therex, 5 mins manual, 10 mins vasoneupmatic compression  PT Patient Time In/Time Out  Time In: 0855  Time Out: 0945    Elisa Corrales

## 2018-12-26 ENCOUNTER — HOSPITAL ENCOUNTER (OUTPATIENT)
Dept: PHYSICAL THERAPY | Age: 38
Discharge: HOME | End: 2018-12-26
Payer: OTHER MISCELLANEOUS

## 2018-12-26 PROCEDURE — 97110 THERAPEUTIC EXERCISES: CPT

## 2018-12-26 PROCEDURE — 97140 MANUAL THERAPY 1/> REGIONS: CPT

## 2018-12-26 NOTE — PROGRESS NOTES
Nichole Gonzalez  : 1980      Payor: Jarod Girmaldo / Plan: [id number] / Product Type: Workers Comp /    [address] at [facility] [address], [facility], [address] [place] [address] Road  [phone]   [phone]       Visit Count:  2       OUTPATIENT PHYSICAL THERAPY:Daily Note 2018    ICD-10: Treatment Diagnosis:    Pain in right shoulder (M25.511)  Incomplete rotator cuff tear or rupture of right shoulder, not specified as traumatic (M75.111)  Impingement syndrome of right shoulder (M75.41)                Precautions/Allergies:   Patient has no known allergies. Fall Risk Score: 1 (? 5 = High Risk)  MD Orders: Eval and Treat  MEDICAL/REFERRING DIAGNOSIS:  Incomplete rotator cuff tear or rupture of right shoulder, not specified as traumatic [M75.111]  Impingement syndrome of right shoulder [M75.41]   DATE OF ONSET: 18  REFERRING PHYSICIAN: Bernard Neely MD  RETURN PHYSICIAN APPOINTMENT: TBD by patient      INITIAL ASSESSMENT:   Mr. Chandrika Javier presents to physical therapy s/p R RTC repair on 18. He presents with decreased strength, posture, ROM, joint mobility, functional mobility. These S/S are consistent with R RTC repair, R shoulder pain and impingement syndrome of R shoulder. Patient will benefit from skilled physical therapy for manual therapeutic techniques (as appropriate), therapeutic exercises and activities, neuromuscular re-education, and comprehensive home exercises program to address current impairments and functional limitations. Nichole Gonzalez will benefit from skilled PT (medically necessary) in order to address above deficits affecting participation in basic ADLs and overall functional tolerance. PROBLEM LIST (Impacting functional limitations):  1. Decreased Strength  2. Decreased ADL/Functional Activities  3. Increased Pain  4. Decreased Activity Tolerance  5.  Increased Shortness of Breath  6. Decreased Flexibility/Joint Mobility  7. Decreased [place] with Home Exercise Program INTERVENTIONS PLANNED:  1. Cold  2. Family Education  3. Heat  4. Home Exercise Program (HEP)  5. Manual Therapy  6. Neuromuscular Re-education/Strengthening  7. Range of Motion (ROM)  8. Therapeutic Activites  9. Vasoneupmatic compression  10. Therapeutic Exercise/Strengthening  11. Transfer Training  12. Ultrasound   TREATMENT PLAN:  Effective Dates: 12/21/18 TO 3/21/2019 (90 days). Frequency/Duration: 2-3 times a week for 90 Days  GOALS: (Goals have been discussed and agreed upon with patient.)  SHORT-TERM FUNCTIONAL GOALS: Time Frame: 4 weeks  1. Parker Duval will report <=5/10 pain with don/doffing clothing as well as minimal/no difficulty. 2. Parker Duval will demonstrate improvement in passive shoulder flexion to >100 degrees to increase UE function and participation in ADLs. 3. Parker Duval will demonstrate demonstrate improvement in passive shoulder abd to >100 degrees to increase UE function and participation in ADLs. 4.  Parker Duval will show a greater than 8 point decrease on the DASH in order to show an increase in upper extremity function. [address] will be independent in all HEP    DISCHARGE GOALS: Time Frame: 12 weeks    1. Parker Duval will show full AROM of the UE in order to return to full functional mobility   2. Parker Duval will show a greater than 15 point decrease on the DASH in order to show an increase in upper extremity function  3. Parker Duval will report doing hair/bathing without difficulty and <=2/10 pain in order to be independent with ADL's  4.  Parker Duval will be independent in all advanced HEP     Rehabilitation Potential For Stated Goals: Good              HISTORY:   History of Present Injury/Illness (Reason for Referral):  Parker Duval presents s/p R RTC repair on 12/14/18. He states it is a work related injury when boxes fell on his shoulder. 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 Pt reports currently having most difficulty sleeping at night    -Present symptoms/complaints (on day of evaluation)  Pain Scale:  · Current: 10/10  · Best: 10/10  · Worst: 10/10    · Aggravating factors: sleeping, donning/doffing clothing, bathing   · Relieving factors: ice    Dominant Side  Right handed  Past Medical History/Comorbidities:   Mr. Tim Pulido  has a past medical history of Chlamydia, Hiatal hernia, High risk sexual behavior, and Urethritis. Mr. Tim Pulido  has a past surgical history that includes hx hernia repair; hx wisdom teeth extraction; and RIGHT SHOULDER ARTHROSCOPY  ROTATOR CUFF /SUBACROMIAL DECOMPRESSION (Right, 12/14/2018). Social History/Living Environment:     Lives with girlfriend   Prior Level of Function/Work/Activity:  Full time at [employer];    Current Medications:    Current Outpatient Medications:     meloxicam (MOBIC) 15 mg tablet, Take 15 mg by mouth daily. , Disp: , Rfl:     traMADol (ULTRAM) 50 mg tablet, Take 1 Tab by mouth every six (6) hours as needed for Pain. Max Daily Amount: 200 mg., Disp: 24 Tab, Rfl: 0   - Oxycodone (per patient) prn 5mg tablet    Date Last Reviewed:  12/26/2018   EXAMINATION:   Observation/Orthostatic Postural Assessment:          Rounded shoulders, slouched to the left guarding R shoulder  Palpation:          TTP along R UT, levator scap and cervical paraspinals and around surgical site;  Surgical sites demonstrate good healing with steri strips on and no signs of infection present.      ROM:    AROM/PROM         Joint: Initial : 12/26/18  Re-Assess Date:   Re-Assess Date:     ACTIVE ROM (standing) RIGHT LEFT RIGHT LEFT RIGHT LEFT   Shoulder Flexion ---        Shoulder Abduction ---        Shoulder Internal Rotation (Apley's) ---        Shoulder External Rotation (Apley's) Elbow ROM WNL        PASSIVE ROM (supine)         Shoulder Flexion 85 deg        Shoulder Abduction 85 deg        Shoulder Internal Rotation 50 deg @ 30 abd        Shoulder External Rotation 10 deg @ 30 abd        Cervical flexion/extension WFL        Cervical Rotation WFL                   Strength:  Not tested secondary to post operative status x1wk per protocol. Joint: Initial  Date: 12/21/18  Re-Assess Date:  Re-Assess Date:     RIGHT LEFT RIGHT LEFT RIGHT LEFT   Shoulder Flexion ---        Shoulder Abduction  (C5) ----        Shoulder Internal Rotation         Shoulder External Rotation         Elbow Flexion  (C6)         Elbow Extension (C7)         Wrist Flexion (C7)         Wrist Extension (C6)         Resisted Thumb Extension/Finger Abduction (C8/T1)         Resisted Cervical Rotation (C1):         Resisted Shoulder Shrug (C2, 3, 4):           Strength                                      Joint Mobility Eval Date: Not test due to post op status  Re-Assess Date:  Re-Assess Date:     RIGHT LEFT RIGHT LEFT RIGHT LEFT   Glenohumeral         Scapulothoracic         Thoracic              Special Tests:      Neurological Screen: Assessed @ Initial Visit    Radiating symptoms? No  Functional Mobility:  Assessed @ Initial Visit ---in brace with abd pillow at all times. Gait and transfers safe. ADLs independent   Balance: normal       Outcome Measure: Tool Used: Disabilities of the Arm, Shoulder and Hand (DASH) Questionnaire - Quick Version  Score:  Initial: 54/55  Most Recent: X/55 (Date: -- )   Interpretation of Score: The DASH is designed to measure the activities of daily living in person's with upper extremity dysfunction or pain. Each section is scored on a 1-5 scale, 5 representing the greatest disability. The scores of each section are added together for a total score of 55. Score 11 12-19 20-28 29-37 38-45 46-54 55   Modifier CH CI CJ CK CL CM CN     ?  Carrying, Moving, and Handling Objects:     - CURRENT STATUS: CM - 80%-99% impaired, limited or restricted    - GOAL STATUS: CI - 1%-19% impaired, limited or restricted    - D/C STATUS:  ---------------To be determined---------------    Medical Necessity:   · Skilled intervention continues to be required due to deficits and impairments seen upon initial evaluation affecting patient's participation in ADLs and functional tasks. ·   Reason for Services/Other Comments:  · Patient continues to require skilled intervention due to deficits and impairments seen upon initial evaluation affecting patient's participation in ADLs and functional tasks. TREATMENT:   (In addition to Assessment/Re-Assessment sessions the following treatments were rendered)    · Pre-Treatment Pain/ Symptoms: Pt reports he performed his HEP without difficulty. 7/10       THERAPEUTIC EXERCISE: (10 minutes):  Exercises per grid below to improve mobility, strength and balance. Required minimal visual and verbal cues to promote proper body alignment, promote proper body posture and promote proper body mechanics. Progressed resistance, range and repetitions as indicated. Date:  12/21/18 Date:  12/26/18 Date:     Activity/Exercise Parameters Parameters Parameters   Education Healing process, HEP, POC, PT goals, protocol postioning out of sling as aswell as sleep positions     Scapular retraction 5x5\"  X 10, 5 sec holds, seated     Assisted pendulums 1 min     Elbow flexion/extension   Supine x 20     Forearm supination/pronation   X 10     Wrist flex/ext  X 10     Gripping   X 10      MedBridge Portal     Manual Therapy Interventions: (30 minutes): . Joint mobilization, Soft tissue mobilization and Manipulation was utilized and necessary because of the patient's restricted joint motion, painful spasm and restricted motion of soft tissue.    Done to improve soft tissue tone and elasticity  as well as promote proper joint movement in order to improve overall movement quality  (Used abbreviations: MET - muscle energy technique; PNF - proprioceptive neuromuscular facilitation; NMR - neuromuscular re-education; AP - anterior to posterior; PA - posterior to anterior)   Patient responded well to all manual interventions listed in chart below with no significant increase in pain/symptoms. Improved ROM demonstrated following interventions and decrease in pain scale listed below. Manual Intervention Date:  12/21/18 Date:  12/26/18 Date:     Soft tissue mobilization Joint Mobility Parameters Parameters Parameters   Cervical paraspinals, UT, levator scap  Strumming/release      upslides(cervical) gradeI-II massage     PROM  All planes gh  X 10 all direction to tolerance     Gh distraction  Grade II  3 x 20 sec with oscillation     Shoulder quadrant  Grade II  3 x 30 sec                     MODALITIES: (10 minutes):        *  Cold Pack Therapy in order to provide analgesia, relieve muscle spasm and reduce inflammation and edema. .    Location= R shoulder  Skin intact pre and post treatment with no adverse symptoms    Treatment/Session Assessment:pt required significant cuing during all activities. Pt heavily muscle guards during PROM, limiting measurements. Required encouragement to decrease right shoulder elevation during static positions. Gave pt handout of all exercises above for HEP. · Post-Treatment Pain/ Symptoms: No c/o's  Pain= 7/10 ·   Compliance with Program/Exercises: Will assess future therapy sessions. · Recommendations/Intent for next treatment session: \"Next visit will focus on advancements to more challenging activities\".     Future Appointments   Date Time Provider Jade Harley   12/28/2018  9:30 AM Matthew Phan [facility]   1/2/2019  9:30 AM Maryanne Nyhan N [facility]   1/4/2019  8:00 AM Maryanne Nyhan N [facility]   1/8/2019  8:45 AM Maryanne Nyhan N [facility]   1/10/2019  8:45 AM Matthew LUIS [facility]   1/14/2019  8:00 AM Winsome VILLALTA [facility]   1/18/2019  8:00 AM Winsome VILLALTA [facility]   1/22/2019  8:00 AM Winsome [facility]ALTA [facility]   1/25/2019  8:00 AM Winsome [facility]ALTA [facility]   1/29/2019  8:00 AM Winsome VILLALTA [facility]   2/1/2019  8:00 AM Yanet Hernandes [facility]       Total Treatment Duration: 50 min   PT Patient Time In/Time Out  Time In: 0800  Time Out: 0900    Han Kline PT

## 2018-12-28 ENCOUNTER — HOSPITAL ENCOUNTER (OUTPATIENT)
Dept: PHYSICAL THERAPY | Age: 38
Discharge: HOME | End: 2018-12-28
Payer: OTHER MISCELLANEOUS

## 2018-12-28 PROCEDURE — 97140 MANUAL THERAPY 1/> REGIONS: CPT

## 2018-12-28 NOTE — PROGRESS NOTES
[facility]  : 1980      Payor: Beltran Rios / Plan: [address] / Product Type: Workers Comp /    [address], [facility], [address] [place] [address]  [phone]   [phone]       Visit Count:  3       OUTPATIENT PHYSICAL THERAPY:Daily Note 2018    ICD-10: Treatment Diagnosis:    Pain in right shoulder (M25.511)  Incomplete rotator cuff tear or rupture of right shoulder, not specified as traumatic (M75.111)  Impingement syndrome of right shoulder (M75.41)                Precautions/Allergies:   Patient has no known allergies. Fall Risk Score: 1 (? 5 = High Risk)  MD Orders: Eval and Treat  MEDICAL/REFERRING DIAGNOSIS:  Incomplete rotator cuff tear or rupture of right shoulder, not specified as traumatic [M75.111]  Impingement syndrome of right shoulder [M75.41]   DATE OF ONSET: 18  REFERRING PHYSICIAN: Leim Ormond, MD  RETURN PHYSICIAN APPOINTMENT: TBD by patient      INITIAL ASSESSMENT:   Mr. Niki Tamayo presents to physical therapy s/p R RTC repair on 18. He presents with decreased strength, posture, ROM, joint mobility, functional mobility. These S/S are consistent with R RTC repair, R shoulder pain and impingement syndrome of R shoulder. Patient will benefit from skilled physical therapy for manual therapeutic techniques (as appropriate), therapeutic exercises and activities, neuromuscular re-education, and comprehensive home exercises program to address current impairments and functional limitations. [facility] will benefit from skilled PT (medically necessary) in order to address above deficits affecting participation in basic ADLs and overall functional tolerance. PROBLEM LIST (Impacting functional limitations):  1. Decreased Strength  2. Decreased ADL/Functional Activities  3. Increased Pain  4. Decreased Activity Tolerance  5.  Increased Shortness of Breath  6. Decreased Flexibility/Joint Mobility  7. Decreased [place] with Home Exercise Program INTERVENTIONS PLANNED:  1. Cold  2. Family Education  3. Heat  4. Home Exercise Program (HEP)  5. Manual Therapy  6. Neuromuscular Re-education/Strengthening  7. Range of Motion (ROM)  8. Therapeutic Activites  9. Vasoneupmatic compression  10. Therapeutic Exercise/Strengthening  11. Transfer Training  12. Ultrasound   TREATMENT PLAN:  Effective Dates: 12/21/18 TO 3/21/2019 (90 days). Frequency/Duration: 2-3 times a week for 90 Days  GOALS: (Goals have been discussed and agreed upon with patient.)  SHORT-TERM FUNCTIONAL GOALS: Time Frame: 4 weeks  1. Janette Daniel will report <=5/10 pain with don/doffing clothing as well as minimal/no difficulty. 2. Janette Daniel will demonstrate improvement in passive shoulder flexion to >100 degrees to increase UE function and participation in ADLs. 3. Janette Byersudder will demonstrate demonstrate improvement in passive shoulder abd to >100 degrees to increase UE function and participation in ADLs. 4.  Janette Daniel will show a greater than 8 point decrease on the DASH in order to show an increase in upper extremity function. [address] will be independent in all HEP    DISCHARGE GOALS: Time Frame: 12 weeks    1. Janette Byersudder will show full AROM of the UE in order to return to full functional mobility   2. Janette Daniel will show a greater than 15 point decrease on the DASH in order to show an increase in upper extremity function  3. Janette Daniel will report doing hair/bathing without difficulty and <=2/10 pain in order to be independent with ADL's  4.  Janette Daniel will be independent in all advanced HEP     Rehabilitation Potential For Stated Goals: Good              HISTORY:   History of Present Injury/Illness (Reason for Referral):  Janette Daniel presents s/p R RTC repair on 12/14/18. He states it is a work related injury when boxes fell on his shoulder. 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 Pt reports currently having most difficulty sleeping at night    -Present symptoms/complaints (on day of evaluation)  Pain Scale:  · Current: 10/10  · Best: 10/10  · Worst: 10/10    · Aggravating factors: sleeping, donning/doffing clothing, bathing   · Relieving factors: ice    Dominant Side  Right handed  Past Medical History/Comorbidities:   Mr. Doug Whitehead  has a past medical history of Chlamydia, Hiatal hernia, High risk sexual behavior, and Urethritis. Mr. Doug Whitehead  has a past surgical history that includes hx hernia repair; hx wisdom teeth extraction; and RIGHT SHOULDER ARTHROSCOPY  ROTATOR CUFF /SUBACROMIAL DECOMPRESSION (Right, 12/14/2018). Social History/Living Environment:     Lives with girlfriend   Prior Level of Function/Work/Activity:  Full time at [employer];    Current Medications:    Current Outpatient Medications:     meloxicam (MOBIC) 15 mg tablet, Take 15 mg by mouth daily. , Disp: , Rfl:     traMADol (ULTRAM) 50 mg tablet, Take 1 Tab by mouth every six (6) hours as needed for Pain. Max Daily Amount: 200 mg., Disp: 24 Tab, Rfl: 0   - Oxycodone (per patient) prn 5mg tablet    Date Last Reviewed:  12/28/2018   EXAMINATION:   Observation/Orthostatic Postural Assessment:          Rounded shoulders, slouched to the left guarding R shoulder  Palpation:          TTP along R UT, levator scap and cervical paraspinals and around surgical site;  Surgical sites demonstrate good healing with steri strips on and no signs of infection present.      ROM:    AROM/PROM         Joint: Initial : 12/26/18  Re-Assess Date:   Re-Assess Date:     ACTIVE ROM (standing) RIGHT LEFT RIGHT LEFT RIGHT LEFT   Shoulder Flexion ---        Shoulder Abduction ---        Shoulder Internal Rotation (Apley's) ---        Shoulder External Rotation (Apley's) Elbow ROM WNL        PASSIVE ROM (supine)         Shoulder Flexion 85 deg        Shoulder Abduction 85 deg        Shoulder Internal Rotation 50 deg @ 30 abd        Shoulder External Rotation 10 deg @ 30 abd        Cervical flexion/extension WFL        Cervical Rotation WFL                   Strength:  Not tested secondary to post operative status x1wk per protocol. Joint: Initial  Date: 12/21/18  Re-Assess Date:  Re-Assess Date:     RIGHT LEFT RIGHT LEFT RIGHT LEFT   Shoulder Flexion ---        Shoulder Abduction  (C5) ----        Shoulder Internal Rotation         Shoulder External Rotation         Elbow Flexion  (C6)         Elbow Extension (C7)         Wrist Flexion (C7)         Wrist Extension (C6)         Resisted Thumb Extension/Finger Abduction (C8/T1)         Resisted Cervical Rotation (C1):         Resisted Shoulder Shrug (C2, 3, 4):           Strength                                      Joint Mobility Eval Date: Not test due to post op status  Re-Assess Date:  Re-Assess Date:     RIGHT LEFT RIGHT LEFT RIGHT LEFT   Glenohumeral         Scapulothoracic         Thoracic              Special Tests:      Neurological Screen: Assessed @ Initial Visit    Radiating symptoms? No  Functional Mobility:  Assessed @ Initial Visit ---in brace with abd pillow at all times. Gait and transfers safe. ADLs independent   Balance: normal       Outcome Measure: Tool Used: Disabilities of the Arm, Shoulder and Hand (DASH) Questionnaire - Quick Version  Score:  Initial: 54/55  Most Recent: X/55 (Date: -- )   Interpretation of Score: The DASH is designed to measure the activities of daily living in person's with upper extremity dysfunction or pain. Each section is scored on a 1-5 scale, 5 representing the greatest disability. The scores of each section are added together for a total score of 55. Score 11 12-19 20-28 29-37 38-45 46-54 55   Modifier CH CI CJ CK CL CM CN     ?  Carrying, Moving, and Handling Objects:     - CURRENT STATUS: CM - 80%-99% impaired, limited or restricted    - GOAL STATUS: CI - 1%-19% impaired, limited or restricted    - D/C STATUS:  ---------------To be determined---------------    Medical Necessity:   · Skilled intervention continues to be required due to deficits and impairments seen upon initial evaluation affecting patient's participation in ADLs and functional tasks. ·   Reason for Services/Other Comments:  · Patient continues to require skilled intervention due to deficits and impairments seen upon initial evaluation affecting patient's participation in ADLs and functional tasks. TREATMENT:   (In addition to Assessment/Re-Assessment sessions the following treatments were rendered)    · Pre-Treatment Pain/ Symptoms: Pt reports he performed his HEP without difficulty. 7/10       THERAPEUTIC EXERCISE: (6 minutes):  Exercises per grid below to improve mobility, strength and balance. Required minimal visual and verbal cues to promote proper body alignment, promote proper body posture and promote proper body mechanics. Progressed resistance, range and repetitions as indicated. Date:  12/21/18 Date:  12/26/18 Date:  12/28/18   Activity/Exercise Parameters Parameters Parameters   Education Healing process, HEP, POC, PT goals, protocol postioning out of sling as aswell as sleep positions     Scapular retraction 5x5\"  X 10, 5 sec holds, seated  20x5\" holds seated   Assisted pendulums 1 min     Elbow flexion/extension   Supine x 20  20x supine   Forearm supination/pronation   X 10     Wrist flex/ext  X 10     Gripping   X 10      MedBridge Portal     Manual Therapy Interventions: (30 minutes): . Joint mobilization, Soft tissue mobilization and Manipulation was utilized and necessary because of the patient's restricted joint motion, painful spasm and restricted motion of soft tissue.    Done to improve soft tissue tone and elasticity  as well as promote proper joint movement in order to improve overall movement quality  (Used abbreviations: MET - muscle energy technique; PNF - proprioceptive neuromuscular facilitation; NMR - neuromuscular re-education; AP - anterior to posterior; PA - posterior to anterior)   Patient responded well to all manual interventions listed in chart below with no significant increase in pain/symptoms. Improved ROM demonstrated following interventions and decrease in pain scale listed below. Manual Intervention Date:  12/21/18 Date:  12/26/18 Date:  12/28/18   Soft tissue mobilization Joint Mobility Parameters Parameters Parameters   Cervical paraspinals, UT, levator scap  Strumming/release  Strumming/passive UT stretch 3 mins    upslides(cervical) gradeI-II massage     PROM  All planes gh  X 10 all direction to tolerance  X 10 all direction to tolerance   Gh distraction  Grade II  3 x 20 sec with oscillation  3 x 20 sec with oscillation    Shoulder quadrant  Grade II  3 x 30 sec                     MODALITIES: (10 minutes):        *  Cold Pack Therapy in order to provide analgesia, relieve muscle spasm and reduce inflammation and edema. .    Location= R shoulder  Skin intact pre and post treatment with no adverse symptoms    Treatment/Session Assessment:pt required significant cuing during all activities and with PROM to maintain relaxed state. tenderness and restrictions throughout UT today with relief post manual stretch/strumming of musculature. · Post-Treatment Pain/ Symptoms: No c/o's  Pain= 7/10 ·   Compliance with Program/Exercises: Compliant  · Recommendations/Intent for next treatment session: \"Next visit will focus on advancements to more challenging activities\".     Future Appointments   Date Time Provider Jade Harley   1/2/2019  9:30 AM Mode LUIS [facility]   1/4/2019  8:00 AM Sudeep [facility]   1/8/2019  8:45 AM Sudeep [facility]   1/10/2019  8:45 AM Mode Spangler [facility]   1/14/2019  8:00 AM Derrick Red N [facility]   1/18/2019  8:00 AM Derrick Red N [facility]   1/22/2019  8:00 AM Derrick Red N [facility]   1/25/2019  8:00 AM Derrick Red N [facility]   1/29/2019  8:00 AM Derrick Red N [facility]   2/1/2019  8:00 AM Cynthia Reed [facility]       Total Treatment Duration: 46 min   PT Patient Time In/Time Out  Time In: 5791  Time Out: 261 [facility]

## 2019-01-02 ENCOUNTER — APPOINTMENT (OUTPATIENT)
Dept: PHYSICAL THERAPY | Age: 39
End: 2019-01-02
Payer: OTHER MISCELLANEOUS

## 2019-01-02 ENCOUNTER — HOSPITAL ENCOUNTER (OUTPATIENT)
Dept: PHYSICAL THERAPY | Age: 39
Discharge: HOME | End: 2019-01-02
Payer: OTHER MISCELLANEOUS

## 2019-01-02 PROCEDURE — 97140 MANUAL THERAPY 1/> REGIONS: CPT

## 2019-01-02 PROCEDURE — 97016 VASOPNEUMATIC DEVICE THERAPY: CPT

## 2019-01-02 NOTE — PROGRESS NOTES
Radha Asif  : 1980      Payor: Jose Dupont / Plan: [address] / Product Type: Workers Comp /    [address] [facility], [address], [address] [place] [address]  [phone]   [phone]       Visit Count:  4       OUTPATIENT PHYSICAL THERAPY:Daily Note 2019    ICD-10: Treatment Diagnosis:    Pain in right shoulder (M25.511)  Incomplete rotator cuff tear or rupture of right shoulder, not specified as traumatic (M75.111)  Impingement syndrome of right shoulder (M75.41)                Precautions/Allergies:   Patient has no known allergies. Fall Risk Score: 1 (? 5 = High Risk)  MD Orders: Eval and Treat  MEDICAL/REFERRING DIAGNOSIS:  Incomplete rotator cuff tear or rupture of right shoulder, not specified as traumatic [M75.111]  Impingement syndrome of right shoulder [M75.41]   DATE OF ONSET: 18  REFERRING PHYSICIAN: Joshua Payan MD  RETURN PHYSICIAN APPOINTMENT: TBD by patient      INITIAL ASSESSMENT:   Mr. Dalton Henry presents to physical therapy s/p R RTC repair on 18. He presents with decreased strength, posture, ROM, joint mobility, functional mobility. These S/S are consistent with R RTC repair, R shoulder pain and impingement syndrome of R shoulder. Patient will benefit from skilled physical therapy for manual therapeutic techniques (as appropriate), therapeutic exercises and activities, neuromuscular re-education, and comprehensive home exercises program to address current impairments and functional limitations. Radha Asif will benefit from skilled PT (medically necessary) in order to address above deficits affecting participation in basic ADLs and overall functional tolerance. PROBLEM LIST (Impacting functional limitations):  1. Decreased Strength  2. Decreased ADL/Functional Activities  3. Increased Pain  4. Decreased Activity Tolerance  5.  Increased Shortness of Breath  6. Decreased Flexibility/Joint Mobility  7. Decreased [place] with Home Exercise Program INTERVENTIONS PLANNED:  1. Cold  2. Family Education  3. Heat  4. Home Exercise Program (HEP)  5. Manual Therapy  6. Neuromuscular Re-education/Strengthening  7. Range of Motion (ROM)  8. Therapeutic Activites  9. Vasoneupmatic compression  10. Therapeutic Exercise/Strengthening  11. Transfer Training  12. Ultrasound   TREATMENT PLAN:  Effective Dates: 12/21/18 TO 3/21/2019 (90 days). Frequency/Duration: 2-3 times a week for 90 Days  GOALS: (Goals have been discussed and agreed upon with patient.)  SHORT-TERM FUNCTIONAL GOALS: Time Frame: 4 weeks  1. Esperanza Nithya will report <=5/10 pain with don/doffing clothing as well as minimal/no difficulty. 2. Savilla Nithya will demonstrate improvement in passive shoulder flexion to >100 degrees to increase UE function and participation in ADLs. 3. Savilla Nithya will demonstrate demonstrate improvement in passive shoulder abd to >100 degrees to increase UE function and participation in ADLs. 4.  Savilla Nithya will show a greater than 8 point decrease on the DASH in order to show an increase in upper extremity function. [address] will be independent in all HEP    DISCHARGE GOALS: Time Frame: 12 weeks    1. Savilla Nithya will show full AROM of the UE in order to return to full functional mobility   2. Savilla Nithya will show a greater than 15 point decrease on the DASH in order to show an increase in upper extremity function  3. Esperanza Nithya will report doing hair/bathing without difficulty and <=2/10 pain in order to be independent with ADL's  4.  Esperanza Nithya will be independent in all advanced HEP     Rehabilitation Potential For Stated Goals: Good              HISTORY:   History of Present Injury/Illness (Reason for Referral):  Esperanza Barriga presents s/p R RTC repair on 12/14/18. He states it is a work related injury when boxes fell on his shoulder. 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 Pt reports currently having most difficulty sleeping at night    -Present symptoms/complaints (on day of evaluation)  Pain Scale:  · Current: 10/10  · Best: 10/10  · Worst: 10/10    · Aggravating factors: sleeping, donning/doffing clothing, bathing   · Relieving factors: ice    Dominant Side  Right handed  Past Medical History/Comorbidities:   Mr. Jonathan Calzada  has a past medical history of Chlamydia, Hiatal hernia, High risk sexual behavior, and Urethritis. Mr. Jonathan Calzada  has a past surgical history that includes hx hernia repair; hx wisdom teeth extraction; and RIGHT SHOULDER ARTHROSCOPY  ROTATOR CUFF /SUBACROMIAL DECOMPRESSION (Right, 12/14/2018). Social History/Living Environment:     Lives with girlfriend   Prior Level of Function/Work/Activity:  Full time at [employer];    Current Medications:    Current Outpatient Medications:     meloxicam (MOBIC) 15 mg tablet, Take 15 mg by mouth daily. , Disp: , Rfl:     traMADol (ULTRAM) 50 mg tablet, Take 1 Tab by mouth every six (6) hours as needed for Pain. Max Daily Amount: 200 mg., Disp: 24 Tab, Rfl: 0   - Oxycodone (per patient) prn 5mg tablet    Date Last Reviewed:  1/2/2019   EXAMINATION:   Observation/Orthostatic Postural Assessment:          Rounded shoulders, slouched to the left guarding R shoulder  Palpation:          TTP along R UT, levator scap and cervical paraspinals and around surgical site;  Surgical sites demonstrate good healing with steri strips on and no signs of infection present.      ROM:    AROM/PROM         Joint: Initial : 12/26/18  Re-Assess Date:   Re-Assess Date:     ACTIVE ROM (standing) RIGHT LEFT RIGHT LEFT RIGHT LEFT   Shoulder Flexion ---        Shoulder Abduction ---        Shoulder Internal Rotation (Apley's) ---        Shoulder External Rotation (Apley's) Elbow ROM WNL        PASSIVE ROM (supine)         Shoulder Flexion 85 deg        Shoulder Abduction 85 deg        Shoulder Internal Rotation 50 deg @ 30 abd        Shoulder External Rotation 10 deg @ 30 abd        Cervical flexion/extension WFL        Cervical Rotation WFL                   Strength:  Not tested secondary to post operative status x1wk per protocol. Joint: Initial  Date: 12/21/18  Re-Assess Date:  Re-Assess Date:     RIGHT LEFT RIGHT LEFT RIGHT LEFT   Shoulder Flexion ---        Shoulder Abduction  (C5) ----        Shoulder Internal Rotation         Shoulder External Rotation         Elbow Flexion  (C6)         Elbow Extension (C7)         Wrist Flexion (C7)         Wrist Extension (C6)         Resisted Thumb Extension/Finger Abduction (C8/T1)         Resisted Cervical Rotation (C1):         Resisted Shoulder Shrug (C2, 3, 4):           Strength                                      Joint Mobility Eval Date: Not test due to post op status  Re-Assess Date:  Re-Assess Date:     RIGHT LEFT RIGHT LEFT RIGHT LEFT   Glenohumeral         Scapulothoracic         Thoracic              Special Tests:      Neurological Screen: Assessed @ Initial Visit    Radiating symptoms? No  Functional Mobility:  Assessed @ Initial Visit ---in brace with abd pillow at all times. Gait and transfers safe. ADLs independent   Balance: normal       Outcome Measure: Tool Used: Disabilities of the Arm, Shoulder and Hand (DASH) Questionnaire - Quick Version  Score:  Initial: 54/55  Most Recent: X/55 (Date: -- )   Interpretation of Score: The DASH is designed to measure the activities of daily living in person's with upper extremity dysfunction or pain. Each section is scored on a 1-5 scale, 5 representing the greatest disability. The scores of each section are added together for a total score of 55. Score 11 12-19 20-28 29-37 38-45 46-54 55   Modifier CH CI CJ CK CL CM CN     ?  Carrying, Moving, and Handling Objects:     - CURRENT STATUS: CM - 80%-99% impaired, limited or restricted    - GOAL STATUS: CI - 1%-19% impaired, limited or restricted    - D/C STATUS:  ---------------To be determined---------------    Medical Necessity:   · Skilled intervention continues to be required due to deficits and impairments seen upon initial evaluation affecting patient's participation in ADLs and functional tasks. ·   Reason for Services/Other Comments:  · Patient continues to require skilled intervention due to deficits and impairments seen upon initial evaluation affecting patient's participation in ADLs and functional tasks. TREATMENT:   (In addition to Assessment/Re-Assessment sessions the following treatments were rendered)    · Pre-Treatment Pain/ Symptoms: Pt reports he saw the MD and told him to remove the abd pillow with the sling and said it was healing well, but to continue to wear the sling x4 weeks  7/10       THERAPEUTIC EXERCISE: (6 minutes):  Exercises per grid below to improve mobility, strength and balance. Required minimal visual and verbal cues to promote proper body alignment, promote proper body posture and promote proper body mechanics. Progressed resistance, range and repetitions as indicated. Date:  12/21/18 Date:  12/26/18 Date:  12/28/18 Date  1/2/19   Activity/Exercise Parameters Parameters Parameters    Education Healing process, HEP, POC, PT goals, protocol postioning out of sling as aswell as sleep positions      Scapular retraction 5x5\"  X 10, 5 sec holds, seated  20x5\" holds seated 20x5\" holds seated   Assisted pendulums 1 min      Elbow flexion/extension   Supine x 20  20x supine 15x supine   Forearm supination/pronation   X 10      Wrist flex/ext  X 10      Gripping   X 10     walkouts    PROM walk outs from counter pain free     [facility]     Manual Therapy Interventions: (30 minutes): . Joint mobilization, Soft tissue mobilization and Manipulation was utilized and necessary because of the patient's restricted joint motion, painful spasm and restricted motion of soft tissue. Done to improve soft tissue tone and elasticity  as well as promote proper joint movement in order to improve overall movement quality  (Used abbreviations: MET - muscle energy technique; PNF - proprioceptive neuromuscular facilitation; NMR - neuromuscular re-education; AP - anterior to posterior; PA - posterior to anterior)   Patient responded well to all manual interventions listed in chart below with no significant increase in pain/symptoms. Improved ROM demonstrated following interventions and decrease in pain scale listed below. Manual Intervention Date:  12/21/18 Date:  12/26/18 Date:  12/28/18 Date  1/2/19   Soft tissue mobilization Joint Mobility Parameters Parameters Parameters    Cervical paraspinals, UT, levator scap  Strumming/release  Strumming/passive UT stretch 3 mins Strumming/pec stretch    upslides(cervical) gradeI-II massage      PROM  All planes gh  X 10 all direction to tolerance  X 10 all direction to tolerance X 10 all direction to tolerance   Gh distraction  Grade II  3 x 20 sec with oscillation  3 x 20 sec with oscillation  3 x 20 sec with oscillation    Shoulder quadrant  Grade II  3 x 30 sec   3 mins   rythmic stab     Manual isometric 5\" 10x IR/ER             MODALITIES: (15 minutes):        *  Cold Pack Therapy in order to provide analgesia, relieve muscle spasm and reduce inflammation and edema. .Vasoneupmatic compression performed today for same reasons as cold pack therapy    Location= R shoulder  Skin intact pre and post treatment with no adverse symptoms    Treatment/Session Assessment:pt continued to require significant cuing during all activities and with PROM to maintain relaxed state. tenderness and restrictions throughout pecs today with relief post manual stretch/strumming of musculature.  Shoulder PROM today post manual interventions= flex: 100, Abd: 78, ER:33, IR 48    · Post-Treatment Pain/ Symptoms: No c/o's  Pain= 7/10 ·   Compliance with Program/Exercises: Compliant  · Recommendations/Intent for next treatment session: \"Next visit will focus on advancements to more challenging activities\".     Future Appointments   Date Time Provider Jade Harley   1/4/2019  8:00 AM Girtha Rufus N [facility]   1/8/2019  8:45 AM Girtha Rufus N [facility]   1/10/2019  8:45 AM Girtha Rufus N [facility]   1/14/2019  8:00 AM Girtha Rufus N [facility]   1/18/2019  8:00 AM Girtha [facility]ufus [facility]   1/22/2019  8:00 AM Girtha Rufus [facility]   1/25/2019  8:00 AM Girtha Rufus N [facility]   1/29/2019  8:00 AM Girtha Rufus N [facility]   2/1/2019  8:00 AM Berenda Hoot [facility]       Total Treatment Duration: 51 min   PT Patient Time In/Time Out  Time In: 5202  Time Out: 891 [facility]

## 2019-01-04 ENCOUNTER — HOSPITAL ENCOUNTER (OUTPATIENT)
Dept: PHYSICAL THERAPY | Age: 39
Discharge: HOME | End: 2019-01-04
Payer: OTHER MISCELLANEOUS

## 2019-01-04 ENCOUNTER — HOSPITAL ENCOUNTER (OUTPATIENT)
Dept: PHYSICAL THERAPY | Age: 39
End: 2019-01-04
Payer: OTHER MISCELLANEOUS

## 2019-01-04 PROCEDURE — 97016 VASOPNEUMATIC DEVICE THERAPY: CPT

## 2019-01-04 PROCEDURE — 97140 MANUAL THERAPY 1/> REGIONS: CPT

## 2019-01-04 NOTE — PROGRESS NOTES
Brody Art  : 1980      Payor: Rao Pressley / Plan: [address] / Product Type: Workers Comp /    [address] [facility] at [address], [address] [facility] Nathan Rizviu, [address]  [phone]   [phone]       Visit Count:  5       OUTPATIENT PHYSICAL THERAPY:Daily Note 2019    ICD-10: Treatment Diagnosis:    Pain in right shoulder (M25.511)  Incomplete rotator cuff tear or rupture of right shoulder, not specified as traumatic (M75.111)  Impingement syndrome of right shoulder (M75.41)                Precautions/Allergies:   Patient has no known allergies. Fall Risk Score: 1 (? 5 = High Risk)  MD Orders: Eval and Treat  MEDICAL/REFERRING DIAGNOSIS:  Incomplete rotator cuff tear or rupture of right shoulder, not specified as traumatic [M75.111]  Impingement syndrome of right shoulder [M75.41]   DATE OF ONSET: 18  REFERRING PHYSICIAN: Jen Pozo MD  RETURN PHYSICIAN APPOINTMENT: TBD by patient      INITIAL ASSESSMENT:   Mr. Breanna Beckett presents to physical therapy s/p R RTC repair on 18. He presents with decreased strength, posture, ROM, joint mobility, functional mobility. These S/S are consistent with R RTC repair, R shoulder pain and impingement syndrome of R shoulder. Patient will benefit from skilled physical therapy for manual therapeutic techniques (as appropriate), therapeutic exercises and activities, neuromuscular re-education, and comprehensive home exercises program to address current impairments and functional limitations. Brody Art will benefit from skilled PT (medically necessary) in order to address above deficits affecting participation in basic ADLs and overall functional tolerance. PROBLEM LIST (Impacting functional limitations):  1. Decreased Strength  2. Decreased ADL/Functional Activities  3. Increased Pain  4. Decreased Activity Tolerance  5.  Increased Shortness of Breath  6. Decreased Flexibility/Joint Mobility  7. Decreased Harding with Home Exercise Program INTERVENTIONS PLANNED:  1. Cold  2. Family Education  3. Heat  4. Home Exercise Program (HEP)  5. Manual Therapy  6. Neuromuscular Re-education/Strengthening  7. Range of Motion (ROM)  8. Therapeutic Activites  9. Vasoneupmatic compression  10. Therapeutic Exercise/Strengthening  11. Transfer Training  12. Ultrasound   TREATMENT PLAN:  Effective Dates: 12/21/18 TO 3/21/2019 (90 days). Frequency/Duration: 2-3 times a week for 90 Days  GOALS: (Goals have been discussed and agreed upon with patient.)  SHORT-TERM FUNCTIONAL GOALS: Time Frame: 4 weeks  1. Coon Dear will report <=5/10 pain with don/doffing clothing as well as minimal/no difficulty. 2. Coon Dear will demonstrate improvement in passive shoulder flexion to >100 degrees to increase UE function and participation in ADLs. 3. Coon Dear will demonstrate demonstrate improvement in passive shoulder abd to >100 degrees to increase UE function and participation in ADLs. 4.  Coon Dear will show a greater than 8 point decrease on the DASH in order to show an increase in upper extremity function. [address] will be independent in all HEP    DISCHARGE GOALS: Time Frame: 12 weeks    1. Coon Dear will show full AROM of the UE in order to return to full functional mobility   2. Coon Dear will show a greater than 15 point decrease on the DASH in order to show an increase in upper extremity function  3. Coon Dear will report doing hair/bathing without difficulty and <=2/10 pain in order to be independent with ADL's  4.  Coon Dear will be independent in all advanced HEP     Rehabilitation Potential For Stated Goals: Good              HISTORY:   History of Present Injury/Illness (Reason for Referral):  Coon Cornelia presents s/p R RTC repair on 12/14/18. He states it is a work related injury when boxes fell on his shoulder. 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 Pt reports currently having most difficulty sleeping at night    -Present symptoms/complaints (on day of evaluation)  Pain Scale:  · Current: 10/10  · Best: 10/10  · Worst: 10/10    · Aggravating factors: sleeping, donning/doffing clothing, bathing   · Relieving factors: ice    Dominant Side  Right handed  Past Medical History/Comorbidities:   Mr. Mills Knee  has a past medical history of Chlamydia, Hiatal hernia, High risk sexual behavior, and Urethritis. Mr. Mills Knee  has a past surgical history that includes hx hernia repair; hx wisdom teeth extraction; and RIGHT SHOULDER ARTHROSCOPY  ROTATOR CUFF /SUBACROMIAL DECOMPRESSION (Right, 12/14/2018). Social History/Living Environment:     Lives with girlfriend   Prior Level of Function/Work/Activity:  Full time at [employer];    Current Medications:    Current Outpatient Medications:     meloxicam (MOBIC) 15 mg tablet, Take 15 mg by mouth daily. , Disp: , Rfl:     traMADol (ULTRAM) 50 mg tablet, Take 1 Tab by mouth every six (6) hours as needed for Pain. Max Daily Amount: 200 mg., Disp: 24 Tab, Rfl: 0   - Oxycodone (per patient) prn 5mg tablet    Date Last Reviewed:  1/4/2019   EXAMINATION:   Observation/Orthostatic Postural Assessment:          Rounded shoulders, slouched to the left guarding R shoulder  Palpation:          TTP along R UT, levator scap and cervical paraspinals and around surgical site;  Surgical sites demonstrate good healing with steri strips on and no signs of infection present.      ROM:    AROM/PROM         Joint: Initial : 12/26/18  Re-Assess Date:   Re-Assess Date:     ACTIVE ROM (standing) RIGHT LEFT RIGHT LEFT RIGHT LEFT   Shoulder Flexion ---        Shoulder Abduction ---        Shoulder Internal Rotation (Apley's) ---        Shoulder External Rotation (Apley's) Elbow ROM WNL        PASSIVE ROM (supine)         Shoulder Flexion 85 deg        Shoulder Abduction 85 deg        Shoulder Internal Rotation 50 deg @ 30 abd        Shoulder External Rotation 10 deg @ 30 abd        Cervical flexion/extension WFL        Cervical Rotation WFL                   Strength:  Not tested secondary to post operative status x1wk per protocol. Joint: Initial  Date: 12/21/18  Re-Assess Date:  Re-Assess Date:     RIGHT LEFT RIGHT LEFT RIGHT LEFT   Shoulder Flexion ---        Shoulder Abduction  (C5) ----        Shoulder Internal Rotation         Shoulder External Rotation         Elbow Flexion  (C6)         Elbow Extension (C7)         Wrist Flexion (C7)         Wrist Extension (C6)         Resisted Thumb Extension/Finger Abduction (C8/T1)         Resisted Cervical Rotation (C1):         Resisted Shoulder Shrug (C2, 3, 4):           Strength                                      Joint Mobility Eval Date: Not test due to post op status  Re-Assess Date:  Re-Assess Date:     RIGHT LEFT RIGHT LEFT RIGHT LEFT   Glenohumeral         Scapulothoracic         Thoracic              Special Tests:      Neurological Screen: Assessed @ Initial Visit    Radiating symptoms? No  Functional Mobility:  Assessed @ Initial Visit ---in brace with abd pillow at all times. Gait and transfers safe. ADLs independent   Balance: normal       Outcome Measure: Tool Used: Disabilities of the Arm, Shoulder and Hand (DASH) Questionnaire - Quick Version  Score:  Initial: 54/55  Most Recent: X/55 (Date: -- )   Interpretation of Score: The DASH is designed to measure the activities of daily living in person's with upper extremity dysfunction or pain. Each section is scored on a 1-5 scale, 5 representing the greatest disability. The scores of each section are added together for a total score of 55. Score 11 12-19 20-28 29-37 38-45 46-54 55   Modifier CH CI CJ CK CL CM CN     ?  Carrying, Moving, and Handling Objects:     - CURRENT STATUS: CM - 80%-99% impaired, limited or restricted    - GOAL STATUS: CI - 1%-19% impaired, limited or restricted    - D/C STATUS:  ---------------To be determined---------------    Medical Necessity:   · Skilled intervention continues to be required due to deficits and impairments seen upon initial evaluation affecting patient's participation in ADLs and functional tasks. ·   Reason for Services/Other Comments:  · Patient continues to require skilled intervention due to deficits and impairments seen upon initial evaluation affecting patient's participation in ADLs and functional tasks. TREATMENT:   (In addition to Assessment/Re-Assessment sessions the following treatments were rendered)    · Pre-Treatment Pain/ Symptoms: Pt report doing alright  7/10       THERAPEUTIC EXERCISE: (6 minutes):  Exercises per grid below to improve mobility, strength and balance. Required minimal visual and verbal cues to promote proper body alignment, promote proper body posture and promote proper body mechanics. Progressed resistance, range and repetitions as indicated. Date:  12/21/18 Date:  12/26/18 Date:  12/28/18 Date  1/2/19 Date  1/4/19   Activity/Exercise Parameters Parameters Parameters     Education Healing process, HEP, POC, PT goals, protocol postioning out of sling as aswell as sleep positions       Scapular retraction 5x5\"  X 10, 5 sec holds, seated  20x5\" holds seated 20x5\" holds seated 20x5\" holds   Assisted pendulums 1 min       Elbow flexion/extension   Supine x 20  20x supine 15x supine    Forearm supination/pronation   X 10       Wrist flex/ext  X 10       Gripping   X 10      walkouts    PROM walk outs from counter pain free 15x for warmup   UT stretch     3x30\"             [employer] [facility]     Manual Therapy Interventions: (30 minutes): . Joint mobilization, Soft tissue mobilization and Manipulation was utilized and necessary because of the patient's restricted joint motion, painful spasm and restricted motion of soft tissue. Done to improve soft tissue tone and elasticity  as well as promote proper joint movement in order to improve overall movement quality  (Used abbreviations: MET - muscle energy technique; PNF - proprioceptive neuromuscular facilitation; NMR - neuromuscular re-education; AP - anterior to posterior; PA - posterior to anterior)   Patient responded well to all manual interventions listed in chart below with no significant increase in pain/symptoms. Improved ROM demonstrated following interventions and decrease in pain scale listed below. Manual Intervention Date:  12/21/18 Date:  12/26/18 Date:  12/28/18 Date  1/2/19 Date  1/4/19   Soft tissue mobilization Joint Mobility Parameters Parameters Parameters     Cervical paraspinals, UT, levator scap  Strumming/release  Strumming/passive UT stretch 3 mins Strumming/pec stretch Strumming/pec stretch    upslides(cervical) gradeI-II massage       PROM  All planes gh  X 10 all direction to tolerance  X 10 all direction to tolerance X 10 all direction to tolerance X 10 all direction to tolerance   Gh distraction  Grade II  3 x 20 sec with oscillation  3 x 20 sec with oscillation  3 x 20 sec with oscillation  3 x 20 sec with oscillation   Shoulder quadrant  Grade II  3 x 30 sec   3 mins 4 min   rythmic stab     Manual isometric 5\" 10x IR/ER Manual isometric 5\" 10x IR/ER              MODALITIES: (15 minutes):        *  Cold Pack Therapy in order to provide analgesia, relieve muscle spasm and reduce inflammation and edema. .Vasoneupmatic compression performed today for same reasons as cold pack therapy    Location= R shoulder  Skin intact pre and post treatment with no adverse symptoms    Treatment/Session Assessment:pt continued to require significant cuing during all activities and with PROM to maintain relaxed state. Improved scaption demonstrated today to approx 110 degrees supine PROM.  Pt's motion is progressing well, however requires cues for a lot of relaxation. · Post-Treatment Pain/ Symptoms: No c/o's  Pain= 7/10 ·   Compliance with Program/Exercises: Compliant  · Recommendations/Intent for next treatment session: \"Next visit will focus on advancements to more challenging activities\".     Future Appointments   Date Time Provider Jade Harley   1/8/2019  8:45 AM Ramiro Blunt [facility]   1/10/2019  8:45 AM Davi VILLALTA [facility]   1/14/2019  8:00 AM Davi [facility]   1/18/2019  8:00 AM Davi [facility]   1/22/2019  8:00 AM Davi [facility]   1/25/2019  8:00 AM Davi [facility]   1/29/2019  8:00 AM Davi [facility]   2/1/2019  8:00 AM Ramiro Blunt [facility]       Total Treatment Duration: 51 min   PT Patient Time In/Time Out  Time In: 6393  Time Out: 1454    Lou Beach

## 2019-01-08 ENCOUNTER — APPOINTMENT (OUTPATIENT)
Dept: PHYSICAL THERAPY | Age: 39
End: 2019-01-08
Payer: OTHER MISCELLANEOUS

## 2019-01-09 ENCOUNTER — HOSPITAL ENCOUNTER (OUTPATIENT)
Dept: PHYSICAL THERAPY | Age: 39
Discharge: HOME | End: 2019-01-09
Payer: OTHER MISCELLANEOUS

## 2019-01-09 PROCEDURE — 97110 THERAPEUTIC EXERCISES: CPT

## 2019-01-09 PROCEDURE — 97140 MANUAL THERAPY 1/> REGIONS: CPT

## 2019-01-09 NOTE — THERAPY DISCHARGE
Adalgisa Ricardo  : 1980      Payor: Yordy Washington / Plan: [id number] [facility] / Product Type: Workers Comp /    [id number] at [address], [address], [address]  [phone]   [phone]       Visit Count:  6       OUTPATIENT PHYSICAL THERAPY:Daily Note, Progress Report and Discharge 2019    ICD-10: Treatment Diagnosis:    Pain in right shoulder (M25.511)  Incomplete rotator cuff tear or rupture of right shoulder, not specified as traumatic (M75.111)  Impingement syndrome of right shoulder (M75.41)                Precautions/Allergies:   Patient has no known allergies. Fall Risk Score: 1 (? 5 = High Risk)  MD Orders: Eval and Treat  MEDICAL/REFERRING DIAGNOSIS:  Incomplete rotator cuff tear or rupture of right shoulder, not specified as traumatic [M75.111]  Impingement syndrome of right shoulder [M75.41]   DATE OF ONSET: 18  REFERRING PHYSICIAN: Jimbo Lundberg MD  RETURN PHYSICIAN APPOINTMENT: TBD by patient      Discharge Summary (19): Adalgisa Ricardo has attended 6 physical therapy sessions including initial evaluation since his RTC repair (R) on 18. He has demonstrated significant increase in R shoulder PROM per protocol and independence with HEP. Patient arrived to therapy today reporting that he is moving to [facility] in 2 days and going out of town to prepare this evening. Patient has been discharged from current physical therapy care secondary to patient moving out of town and educated in continued HEP, healing process and what to expect as he progresses throughout future therapy visits in a new location. Patient also educated on contacting his  to set up additional referral to a therapy clinic in his new town that will be in network for him. Pt is discharged due to location change.     INITIAL ASSESSMENT:   Mr. Chandrika Lorenz presents to physical therapy s/p R RTC repair on 12/14/18. He presents with decreased strength, posture, ROM, joint mobility, functional mobility. These S/S are consistent with R RTC repair, R shoulder pain and impingement syndrome of R shoulder. Patient will benefit from skilled physical therapy for manual therapeutic techniques (as appropriate), therapeutic exercises and activities, neuromuscular re-education, and comprehensive home exercises program to address current impairments and functional limitations. PROBLEM LIST (Impacting functional limitations):  1. Decreased Strength  2. Decreased ADL/Functional Activities  3. Increased Pain  4. Decreased Activity Tolerance  5. Increased Shortness of Breath  6. Decreased Flexibility/Joint Mobility      TREATMENT PLAN:  Effective Dates: 12/21/18 TO 3/21/2019 (90 days). Frequency/Duration: 2-3 times a week for 90 Days  GOALS: (Goals have been discussed and agreed upon with patient.)  SHORT-TERM FUNCTIONAL GOALS: Time Frame: 4 weeks  1. Glenora Lever will report <=5/10 pain with don/doffing clothing as well as minimal/no difficulty. 2. Glenora Lever will demonstrate improvement in passive shoulder flexion to >100 degrees to increase UE function and participation in ADLs. (met 1.9.19)  3. Glenora Lever will demonstrate demonstrate improvement in passive shoulder abd to >100 degrees to increase UE function and participation in ADLs. (@100 degrees 1.9.19)  4. Glenora Lever will show a greater than 8 point decrease on the DASH in order to show an increase in upper extremity function. [address] will be independent in all HEP    DISCHARGE GOALS: Time Frame: 12 weeks    1. Glenora Lever will show full AROM of the UE in order to return to full functional mobility   2. Glenora Lever will show a greater than 15 point decrease on the DASH in order to show an increase in upper extremity function  3.  Glenora Lever will report doing hair/bathing without difficulty and <=2/10 pain in order to be independent with ADL's  4. Wilberto Stewart will be independent in all advanced HEP     Rehabilitation Potential For Stated Goals: Good              HISTORY:   History of Present Injury/Illness (Reason for Referral):  Wilberto Stewart presents s/p R RTC repair on 12/14/18. He states it is a work related injury when boxes fell on his shoulder. 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 Pt reports currently having most difficulty sleeping at night    -Present symptoms/complaints (on day of evaluation)  Pain Scale:  · Current: 10/10  · Best: 10/10  · Worst: 10/10    · Aggravating factors: sleeping, donning/doffing clothing, bathing   · Relieving factors: ice    Dominant Side  Right handed  Past Medical History/Comorbidities:   Mr. Estefany Moreno  has a past medical history of Chlamydia, Hiatal hernia, High risk sexual behavior, and Urethritis. Mr. Estefany Moreno  has a past surgical history that includes hx hernia repair; hx wisdom teeth extraction; and RIGHT SHOULDER ARTHROSCOPY  ROTATOR CUFF /SUBACROMIAL DECOMPRESSION (Right, 12/14/2018). Social History/Living Environment:     Lives with girlfriend   Prior Level of Function/Work/Activity:  Full time at [employer];    Current Medications:    Current Outpatient Medications:     meloxicam (MOBIC) 15 mg tablet, Take 15 mg by mouth daily. , Disp: , Rfl:     traMADol (ULTRAM) 50 mg tablet, Take 1 Tab by mouth every six (6) hours as needed for Pain.  Max Daily Amount: 200 mg., Disp: 24 Tab, Rfl: 0   - Oxycodone (per patient) prn 5mg tablet    Date Last Reviewed:  1/9/2019   EXAMINATION:   Observation/Orthostatic Postural Assessment:          Rounded shoulders, slouched to the left guarding R shoulder  Palpation:          TTP along R UT, levator scap and cervical paraspinals and around surgical site;  Surgical sites demonstrate good healing with steri strips on and no signs of infection present. ROM:    AROM/PROM         Joint: Initial : 12/26/18  Re-Assess Date:  1/9/19  Re-Assess Date:     ACTIVE ROM (standing) RIGHT LEFT RIGHT LEFT RIGHT LEFT   Shoulder Flexion ---        Shoulder Abduction ---        Shoulder Internal Rotation (Apley's) ---        Shoulder External Rotation (Apley's)         Elbow ROM WNL        PASSIVE ROM (supine)         Shoulder Flexion 85 deg  120      Shoulder Abduction 85 deg  100      Shoulder Internal Rotation 50 deg @ 30 abd  55 @ 30 abd      Shoulder External Rotation 10 deg @ 30 abd  46 @ 30 abd      Cervical flexion/extension WFL        Cervical Rotation WFL                   Strength:  Not tested secondary to post operative status x1wk per protocol. Joint: Initial  Date: 12/21/18  Re-Assess Date:  Re-Assess Date:     RIGHT LEFT RIGHT LEFT RIGHT LEFT   Shoulder Flexion ---  --      Shoulder Abduction  (C5) ----  --      Shoulder Internal Rotation   --      Shoulder External Rotation   --      Elbow Flexion  (C6)         Elbow Extension (C7)         Wrist Flexion (C7)         Wrist Extension (C6)         Resisted Thumb Extension/Finger Abduction (C8/T1)         Resisted Cervical Rotation (C1):         Resisted Shoulder Shrug (C2, 3, 4):           Strength                                      Joint Mobility Eval Date: Not test due to post op status  Re-Assess Date: 1/9/19  Re-Assess Date:     RIGHT LEFT RIGHT LEFT RIGHT LEFT   Glenohumeral   Guarded and difficult to get end-feel      Scapulothoracic         Thoracic              Special Tests:      Neurological Screen: Assessed @ Initial Visit    Radiating symptoms? No  Functional Mobility:  Assessed @ Initial Visit ---in brace no pillow  Gait and transfers safe. ADLs independent   Balance: normal       Outcome Measure:    Tool Used: Disabilities of the Arm, Shoulder and Hand (DASH) Questionnaire - Quick Version  Score:  Initial: 54/55  Most Recent: 40/55 (Date: -- ) Interpretation of Score: The DASH is designed to measure the activities of daily living in person's with upper extremity dysfunction or pain. Each section is scored on a 1-5 scale, 5 representing the greatest disability. The scores of each section are added together for a total score of 55. Score 11 12-19 20-28 29-37 38-45 46-54 55   Modifier CH CI CJ CK CL CM CN     ? Carrying, Moving, and Handling Objects:     - CURRENT STATUS: CL - 60%-79% impaired, limited or restricted    - GOAL STATUS: CI - 1%-19% impaired, limited or restricted    - D/C STATUS:  ---------------To be determined---------------     TREATMENT:   (In addition to Assessment/Re-Assessment sessions the following treatments were rendered)    · Pre-Treatment Pain/ Symptoms: Pt report doing alright, just a little sore. Pt states he is moving to [facility] on Saturday (1.12.19) and going to [place] this evening for the remainder of the week and will not be able to return for additional therapy at this location. Pt states understanding to contact  about transfer in location and MD to notify him on his change in address and care. 7/10       THERAPEUTIC EXERCISE: (26 minutes):  Exercises per grid below to improve mobility, strength and balance. Required minimal visual and verbal cues to promote proper body alignment, promote proper body posture and promote proper body mechanics. Progressed resistance, range and repetitions as indicated. Date:  12/21/18 Date:  12/26/18 Date:  12/28/18 Date  1/2/19 Date  1/4/19 Date  1/9/19   Activity/Exercise Parameters Parameters Parameters      Education Healing process, HEP, POC, PT goals, protocol postioning out of sling as aswell as sleep positions     Re-assessment/ HEP re-education and independence shown with all exercises with minimal cues.  Handout provided and pt educated on continued protocol and healing   Scapular retraction 5x5\"  X 10, 5 sec holds, seated  20x5\" holds seated 20x5\" holds seated 20x5\" holds 20x5\"   Assisted pendulums 1 min        Elbow flexion/extension   Supine x 20  20x supine 15x supine     Forearm supination/pronation   X 10        Wrist flex/ext  X 10        Gripping   X 10       walkouts    PROM walk outs from counter pain free 15x for warmup 10x10\" gentle stretch from table   UT stretch     3x30\"    Pulleys      5 mins   PROM with dowel mandy      ER, Abd and Scaption                                MedBridge Portal     Manual Therapy Interventions: (15 minutes): . Joint mobilization, Soft tissue mobilization and Manipulation was utilized and necessary because of the patient's restricted joint motion, painful spasm and restricted motion of soft tissue. Done to improve soft tissue tone and elasticity  as well as promote proper joint movement in order to improve overall movement quality  (Used abbreviations: MET - muscle energy technique; PNF - proprioceptive neuromuscular facilitation; NMR - neuromuscular re-education; AP - anterior to posterior; PA - posterior to anterior)   Patient responded well to all manual interventions listed in chart below with no significant increase in pain/symptoms. Improved ROM demonstrated following interventions and decrease in pain scale listed below.                Manual Intervention Date:  12/21/18 Date:  12/26/18 Date:  12/28/18 Date  1/2/19 Date  1/4/19 Date  1/9/19   Soft tissue mobilization Joint Mobility Parameters Parameters Parameters      Cervical paraspinals, UT, levator scap  Strumming/release  Strumming/passive UT stretch 3 mins Strumming/pec stretch Strumming/pec stretch Strumming/pec stretch    upslides(cervical) gradeI-II massage        PROM  All planes gh  X 10 all direction to tolerance  X 10 all direction to tolerance X 10 all direction to tolerance X 10 all direction to tolerance X 10 all direction to tolerance   Gh distraction  Grade II  3 x 20 sec with oscillation  3 x 20 sec with oscillation  3 x 20 sec with oscillation  3 x 20 sec with oscillation 3 x 20 sec with oscillation   Shoulder quadrant  Grade II  3 x 30 sec   3 mins 4 min 4 mins   rythmic stab     Manual isometric 5\" 10x IR/ER Manual isometric 5\" 10x IR/ER                MODALITIES: (0 minutes):        *  Cold Pack Therapy in order to provide analgesia, relieve muscle spasm and reduce inflammation and edema. .Vasoneupmatic compression performed today for same reasons as cold pack therapy    Location= R shoulder  Skin intact pre and post treatment with no adverse symptoms    Treatment/Session Assessment: See re-evaluation and discharge summary above for full details. Pt demonstrates independence with [facility] and educated in protocol progression to be expected once he moves to [facility]. Pt given handouts of exercises and discharged from physical therapy at this location at this time secondary to patient moving in 2 days to [facility]. · Post-Treatment Pain/ Symptoms: No c/o's  Pain= 5/10 ·   Compliance with Program/Exercises: Compliant    No future appointments.     Total Treatment Duration: 48 min   PT Patient Time In/Time Out  Time In: 1302  Time Out: 109 [facility]

## 2019-01-10 ENCOUNTER — APPOINTMENT (OUTPATIENT)
Dept: PHYSICAL THERAPY | Age: 39
End: 2019-01-10
Payer: OTHER MISCELLANEOUS

## 2019-01-14 ENCOUNTER — HOSPITAL ENCOUNTER (OUTPATIENT)
Dept: PHYSICAL THERAPY | Age: 39
Discharge: HOME | End: 2019-01-14
Payer: OTHER MISCELLANEOUS

## 2019-01-14 ENCOUNTER — APPOINTMENT (OUTPATIENT)
Dept: PHYSICAL THERAPY | Age: 39
End: 2019-01-14
Payer: OTHER MISCELLANEOUS

## 2019-01-14 PROCEDURE — 97140 MANUAL THERAPY 1/> REGIONS: CPT

## 2019-01-14 PROCEDURE — 97110 THERAPEUTIC EXERCISES: CPT

## 2019-01-16 ENCOUNTER — HOSPITAL ENCOUNTER (OUTPATIENT)
Dept: PHYSICAL THERAPY | Age: 39
Discharge: HOME | End: 2019-01-16
Payer: OTHER MISCELLANEOUS

## 2019-01-16 PROCEDURE — 97140 MANUAL THERAPY 1/> REGIONS: CPT

## 2019-01-16 NOTE — PROGRESS NOTES
Janette Fredy  : 1980      Payor: Lena Shukla / Plan: [id number] / Product Type: Workers Comp /    [address] at [place] [address] [address], [facility], [address] [place] [address] Road  [phone]   [phone]       Visit Count:  Visit Count:       OUTPATIENT PHYSICAL THERAPY:Daily Note 2019    ICD-10: Treatment Diagnosis:    Pain in right shoulder (M25.511)  Incomplete rotator cuff tear or rupture of right shoulder, not specified as traumatic (M75.111)  Impingement syndrome of right shoulder (M75.41)                Precautions/Allergies:   Patient has no known allergies. Fall Risk Score: 1 (? 5 = High Risk)  MD Orders: Eval and Treat  MEDICAL/REFERRING DIAGNOSIS:  Incomplete rotator cuff tear or rupture of right shoulder, not specified as traumatic [M75.111]  Impingement syndrome of right shoulder [M75.41]   DATE OF ONSET: 18  REFERRING PHYSICIAN: Hillary Sosa MD  RETURN PHYSICIAN APPOINTMENT: TBD by patient          INITIAL ASSESSMENT:   Mr. Gene Blount presents to physical therapy s/p R RTC repair on 18. He presents with decreased strength, posture, ROM, joint mobility, functional mobility. These S/S are consistent with R RTC repair, R shoulder pain and impingement syndrome of R shoulder. Patient will benefit from skilled physical therapy for manual therapeutic techniques (as appropriate), therapeutic exercises and activities, neuromuscular re-education, and comprehensive home exercises program to address current impairments and functional limitations. PROBLEM LIST (Impacting functional limitations):  1. Decreased Strength  2. Decreased ADL/Functional Activities  3. Increased Pain  4. Decreased Activity Tolerance  5. Increased Shortness of Breath  6. Decreased Flexibility/Joint Mobility      TREATMENT PLAN:  Effective Dates: 18 TO 3/21/2019 (90 days).   Frequency/Duration: 2-3 times a week for 90 Days  GOALS: (Goals have been discussed and agreed upon with patient.)  SHORT-TERM FUNCTIONAL GOALS: Time Frame: 4 weeks  1. Elham Calix will report <=5/10 pain with don/doffing clothing as well as minimal/no difficulty. 2. Elham Calix will demonstrate improvement in passive shoulder flexion to >100 degrees to increase UE function and participation in ADLs. (met 1.9.19)  3. Elham Calix will demonstrate demonstrate improvement in passive shoulder abd to >100 degrees to increase UE function and participation in ADLs. (@100 degrees 1.9.19)  4. Elham Calix will show a greater than 8 point decrease on the DASH in order to show an increase in upper extremity function. [address] will be independent in all HEP    DISCHARGE GOALS: Time Frame: 12 weeks    1. Elham Calix will show full AROM of the UE in order to return to full functional mobility   2. Elham Calix will show a greater than 15 point decrease on the DASH in order to show an increase in upper extremity function  3. Elham Calix will report doing hair/bathing without difficulty and <=2/10 pain in order to be independent with ADL's  4. Elham Calix will be independent in all advanced HEP     Rehabilitation Potential For Stated Goals: Good              HISTORY:   History of Present Injury/Illness (Reason for Referral):  Elham Calix presents s/p R RTC repair on 12/14/18. He states it is a work related injury when boxes fell on his shoulder. 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  Pt reports currently having most difficulty sleeping at night    -Present symptoms/complaints (on day of evaluation)  Pain Scale:  · Current: 10/10  · Best: 10/10  · Worst: 10/10    · Aggravating factors: sleeping, donning/doffing clothing, bathing   · Relieving factors: ice    Dominant Side  Right handed  Past Medical History/Comorbidities:   Mr. Eloisa Rivera  has a past medical history of Chlamydia, Hiatal hernia, High risk sexual behavior, and Urethritis. He also has no past medical history of Adverse effect of anesthesia, Difficult intubation, Malignant hyperthermia due to anesthesia, Nausea & vomiting, or Pseudocholinesterase deficiency. Mr. Eloisa Rivera  has a past surgical history that includes hx hernia repair and hx wisdom teeth extraction. Social History/Living Environment:     Lives with girlfriend   Prior Level of Function/Work/Activity:  Full time at [employer];    Current Medications:    Current Outpatient Medications:     meloxicam (MOBIC) 15 mg tablet, Take 15 mg by mouth daily. , Disp: , Rfl:     traMADol (ULTRAM) 50 mg tablet, Take 1 Tab by mouth every six (6) hours as needed for Pain. Max Daily Amount: 200 mg., Disp: 24 Tab, Rfl: 0   - Oxycodone (per patient) prn 5mg tablet    Date Last Reviewed:  1/16/2019   EXAMINATION:   Observation/Orthostatic Postural Assessment:          Rounded shoulders, slouched to the left guarding R shoulder  Palpation:          TTP along R UT, levator scap and cervical paraspinals and around surgical site;  Surgical sites demonstrate good healing with steri strips on and no signs of infection present.      ROM:    AROM/PROM         Joint: Initial : 12/26/18  Re-Assess Date:  1/9/19  Re-Assess Date:     ACTIVE ROM (standing) RIGHT LEFT RIGHT LEFT RIGHT LEFT   Shoulder Flexion ---        Shoulder Abduction ---        Shoulder Internal Rotation (Apley's) ---        Shoulder External Rotation (Apley's)         Elbow ROM WNL        PASSIVE ROM (supine)         Shoulder Flexion 85 deg  120      Shoulder Abduction 85 deg  100      Shoulder Internal Rotation 50 deg @ 30 abd  55 @ 30 abd      Shoulder External Rotation 10 deg @ 30 abd  46 @ 30 abd      Cervical flexion/extension WFL        Cervical Rotation WFL                   Strength:  Not tested secondary to post operative status x1wk per protocol. Joint: Initial  Date: 12/21/18  Re-Assess Date:  Re-Assess Date:     RIGHT LEFT RIGHT LEFT RIGHT LEFT   Shoulder Flexion ---  --      Shoulder Abduction  (C5) ----  --      Shoulder Internal Rotation   --      Shoulder External Rotation   --      Elbow Flexion  (C6)         Elbow Extension (C7)         Wrist Flexion (C7)         Wrist Extension (C6)         Resisted Thumb Extension/Finger Abduction (C8/T1)         Resisted Cervical Rotation (C1):         Resisted Shoulder Shrug (C2, 3, 4):           Strength                                      Joint Mobility Eval Date: Not test due to post op status  Re-Assess Date: 1/9/19  Re-Assess Date:     RIGHT LEFT RIGHT LEFT RIGHT LEFT   Glenohumeral   Guarded and difficult to get end-feel      Scapulothoracic         Thoracic              Special Tests:      Neurological Screen: Assessed @ Initial Visit    Radiating symptoms? No  Functional Mobility:  Assessed @ Initial Visit ---in brace no pillow  Gait and transfers safe. ADLs independent   Balance: normal       Outcome Measure: Tool Used: Disabilities of the Arm, Shoulder and Hand (DASH) Questionnaire - Quick Version  Score:  Initial: 54/55  Most Recent: 40/55 (Date: -- )   Interpretation of Score: The DASH is designed to measure the activities of daily living in person's with upper extremity dysfunction or pain. Each section is scored on a 1-5 scale, 5 representing the greatest disability. The scores of each section are added together for a total score of 55. Score 11 12-19 20-28 29-37 38-45 46-54 55   Modifier CH CI CJ CK CL CM CN     ?  Carrying, Moving, and Handling Objects:     - CURRENT STATUS: CL - 60%-79% impaired, limited or restricted    - GOAL STATUS: CI - 1%-19% impaired, limited or restricted    - D/C STATUS:  ---------------To be determined---------------     TREATMENT:   (In addition to Assessment/Re-Assessment sessions the following treatments were rendered)    · Pre-Treatment Pain/ Symptoms: Adalgisa Ricardo states he thought his appointment was today versus tomorrow. THERAPEUTIC EXERCISE: (10 minutes):  Exercises per grid below to improve mobility, strength and balance. Required minimal visual and verbal cues to promote proper body alignment, promote proper body posture and promote proper body mechanics. Progressed resistance, range and repetitions as indicated. Date:  12/21/18 Date:  12/26/18 Date:  12/28/18 Date  1/2/19 Date  1/4/19 Date  1/9/19 Date:  1/14/19 Date  1/16/19   Activity/Exercise Parameters Parameters Parameters        Education Healing process, HEP, POC, PT goals, protocol postioning out of sling as aswell as sleep positions     Re-assessment/ HEP re-education and independence shown with all exercises with minimal cues. Handout provided and pt educated on continued protocol and healing     Scapular retraction 5x5\"  X 10, 5 sec holds, seated  20x5\" holds seated 20x5\" holds seated 20x5\" holds 20x5\" 20\"x10    Assisted pendulums 1 min          Elbow flexion/extension   Supine x 20  20x supine 15x supine       Forearm supination/pronation   X 10          Wrist flex/ext  X 10          Gripping   X 10         walkouts    PROM walk outs from counter pain free 15x for warmup 10x10\" gentle stretch from table  10x10\" PROM   UT stretch     3x30\"      Pulleys      5 mins 8 minutes  6 mins   PROM with dowel mandy      ER, Abd and Scaption  ER 10x10\" supine                                      MedBridge Portal     Manual Therapy Interventions: (25 minutes): . Joint mobilization, Soft tissue mobilization and Manipulation was utilized and necessary because of the patient's restricted joint motion, painful spasm and restricted motion of soft tissue.    Done to improve soft tissue tone and elasticity  as well as promote proper joint movement in order to improve overall movement quality  (Used abbreviations: MET - muscle energy technique; PNF - proprioceptive neuromuscular facilitation; NMR - neuromuscular re-education; AP - anterior to posterior; PA - posterior to anterior)   Patient responded well to all manual interventions listed in chart below with no significant increase in pain/symptoms. Improved ROM demonstrated following interventions and decrease in pain scale listed below. Manual Intervention Date:  12/21/18 Date:  12/26/18 Date:  12/28/18 Date  1/2/19 Date  1/4/19 Date  1/9/19 Date:  1/14/19 Date  1/16/19   Soft tissue mobilization Joint Mobility Parameters Parameters Parameters        Cervical paraspinals, UT, levator scap  Strumming/release  Strumming/passive UT stretch 3 mins Strumming/pec stretch Strumming/pec stretch Strumming/pec stretch Strumming/pec stretch Strumming/pec stretch    upslides(cervical) gradeI-II massage          PROM  All planes gh  X 10 all direction to tolerance  X 10 all direction to tolerance X 10 all direction to tolerance X 10 all direction to tolerance X 10 all direction to tolerance All planes  All planes   Gh distraction  Grade II  3 x 20 sec with oscillation  3 x 20 sec with oscillation  3 x 20 sec with oscillation  3 x 20 sec with oscillation 3 x 20 sec with oscillation Performed Grade III Grade III   Shoulder quadrant  Grade II  3 x 30 sec   3 mins 4 min 4 mins 4 minutes 4 mins   Rhythmic stab     Manual isometric 5\" 10x IR/ER Manual isometric 5\" 10x IR/ER  Submax IR/ER supine  Sub max IR/ER supine 10x each 10\" holds                 MODALITIES: (0 minutes):        *  Cold Pack Therapy in order to provide analgesia, relieve muscle spasm and reduce inflammation and edema. .Vasoneupmatic compression performed today for same reasons as cold pack therapy    Location= R shoulder  Skin intact pre and post treatment with no adverse symptoms    Treatment/Session Assessment: Fitz Nguyen requires consistent verbal cues for maintaining within limitations and not pushing through painful PROM.   [address] [facility] degrees of Supine PROM flexion and 122 supine abd today. Good tolerance to isometrics today and no increase in pain. Pt to progress to standing dowel mandy PROM as tolerated per protocol.     · Post-Treatment Pain/ Symptoms: No c/o's  Pain= 5/10 ·   Compliance with Program/Exercises: Compliant    Future Appointments   Date Time Provider Jade Harley   1[facility]21[facility]2019 10:15 [facility]   1/24/2019  8:45 [facility]   1/28/2019 10:15 AM [facility]   1/31/2019 10:15 AM [facility]ane [facility]   2/4/2019 10:15 AM [facility]lda [facility]   2/7/2019 10:15 AM [facility]       Total Treatment Duration: 35 min   PT Patient Time In/Time Out  Time In: 0810  Time Out: 0845    Ana Perez

## 2019-01-17 ENCOUNTER — APPOINTMENT (OUTPATIENT)
Dept: PHYSICAL THERAPY | Age: 39
End: 2019-01-17
Payer: OTHER MISCELLANEOUS

## 2019-01-18 ENCOUNTER — APPOINTMENT (OUTPATIENT)
Dept: PHYSICAL THERAPY | Age: 39
End: 2019-01-18
Payer: OTHER MISCELLANEOUS

## 2019-01-21 ENCOUNTER — HOSPITAL ENCOUNTER (OUTPATIENT)
Dept: PHYSICAL THERAPY | Age: 39
Discharge: HOME | End: 2019-01-21
Payer: OTHER MISCELLANEOUS

## 2019-01-21 ENCOUNTER — APPOINTMENT (OUTPATIENT)
Dept: PHYSICAL THERAPY | Age: 39
End: 2019-01-21
Payer: OTHER MISCELLANEOUS

## 2019-01-21 PROCEDURE — 97140 MANUAL THERAPY 1/> REGIONS: CPT

## 2019-01-21 PROCEDURE — 97110 THERAPEUTIC EXERCISES: CPT

## 2019-01-21 NOTE — PROGRESS NOTES
Cesar Darby  : 1980      Payor: [address] / Plan: [address] / Product Type: Workers Comp /    Felipe Gonzáles at [facility] [address], [facility], [address] [place] [address]  [phone]   [phone]       Visit Count:       OUTPATIENT PHYSICAL THERAPY:Daily Note 2019    ICD-10: Treatment Diagnosis:    Pain in right shoulder (M25.511)  Incomplete rotator cuff tear or rupture of right shoulder, not specified as traumatic (M75.111)  Impingement syndrome of right shoulder (M75.41)                Precautions/Allergies:   Patient has no known allergies. Fall Risk Score: 1 (? 5 = High Risk)  MD Orders: Eval and Treat  MEDICAL/REFERRING DIAGNOSIS:  Incomplete rotator cuff tear or rupture of right shoulder, not specified as traumatic [M75.111]  Impingement syndrome of right shoulder [M75.41]   DATE OF ONSET: 18  REFERRING PHYSICIAN: Dione Quiñones MD  RETURN PHYSICIAN APPOINTMENT: TBD by patient          INITIAL ASSESSMENT:   Mr. Nory Mcallister presents to physical therapy s/p R RTC repair on 18. He presents with decreased strength, posture, ROM, joint mobility, functional mobility. These S/S are consistent with R RTC repair, R shoulder pain and impingement syndrome of R shoulder. Patient will benefit from skilled physical therapy for manual therapeutic techniques (as appropriate), therapeutic exercises and activities, neuromuscular re-education, and comprehensive home exercises program to address current impairments and functional limitations. PROBLEM LIST (Impacting functional limitations):  1. Decreased Strength  2. Decreased ADL/Functional Activities  3. Increased Pain  4. Decreased Activity Tolerance  5. Increased Shortness of Breath  6. Decreased Flexibility/Joint Mobility      TREATMENT PLAN:  Effective Dates: 18 TO 3/21/2019 (90 days).   Frequency/Duration: 2-3 times a week for 90 Days  GOALS: (Goals have been discussed and agreed upon with patient.)  SHORT-TERM FUNCTIONAL GOALS: Time Frame: 4 weeks  1. Terrell Larry will report <=5/10 pain with don/doffing clothing as well as minimal/no difficulty. 2. Terrell Larry will demonstrate improvement in passive shoulder flexion to >100 degrees to increase UE function and participation in ADLs. (met 1.9.19)  3. Terrell Larry will demonstrate demonstrate improvement in passive shoulder abd to >100 degrees to increase UE function and participation in ADLs. (@100 degrees 1.9.19)  4. Terrell Larry will show a greater than 8 point decrease on the DASH in order to show an increase in upper extremity function. [address] will be independent in all HEP    DISCHARGE GOALS: Time Frame: 12 weeks    1. Terrell Larry will show full AROM of the UE in order to return to full functional mobility   2. Terrell Larry will show a greater than 15 point decrease on the DASH in order to show an increase in upper extremity function  3. Terrell Larry will report doing hair/bathing without difficulty and <=2/10 pain in order to be independent with ADL's  4. Terrell Larry will be independent in all advanced HEP     Rehabilitation Potential For Stated Goals: Good              HISTORY:   History of Present Injury/Illness (Reason for Referral):  Terrell Larry presents s/p R RTC repair on 12/14/18. He states it is a work related injury when boxes fell on his shoulder. 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  Pt reports currently having most difficulty sleeping at night    -Present symptoms/complaints (on day of evaluation)  Pain Scale:  · Current: 10/10  · Best: 10/10  · Worst: 10/10    · Aggravating factors: sleeping, donning/doffing clothing, bathing   · Relieving factors: ice    Dominant Side  Right handed  Past Medical History/Comorbidities: Mr. Margret Grullon  has a past medical history of Chlamydia, Hiatal hernia, High risk sexual behavior, and Urethritis. He also has no past medical history of Adverse effect of anesthesia, Difficult intubation, Malignant hyperthermia due to anesthesia, Nausea & vomiting, or Pseudocholinesterase deficiency. Mr. Margret Grullon  has a past surgical history that includes hx hernia repair and hx wisdom teeth extraction. Social History/Living Environment:     Lives with girlfriend   Prior Level of Function/Work/Activity:  Full time at [employer];    Current Medications:    Current Outpatient Medications:     meloxicam (MOBIC) 15 mg tablet, Take 15 mg by mouth daily. , Disp: , Rfl:     traMADol (ULTRAM) 50 mg tablet, Take 1 Tab by mouth every six (6) hours as needed for Pain. Max Daily Amount: 200 mg., Disp: 24 Tab, Rfl: 0   - Oxycodone (per patient) prn 5mg tablet    Date Last Reviewed:  1/21/2019   EXAMINATION:   Observation/Orthostatic Postural Assessment:          Rounded shoulders, slouched to the left guarding R shoulder  Palpation:          TTP along R UT, levator scap and cervical paraspinals and around surgical site;  Surgical sites demonstrate good healing with steri strips on and no signs of infection present.      ROM:    AROM/PROM         Joint: Initial : 12/26/18  Re-Assess Date:  1/9/19  Re-Assess Date:  1/24/19    ACTIVE ROM (standing) RIGHT LEFT RIGHT LEFT RIGHT LEFT   Shoulder Flexion ---        Shoulder Abduction ---        Shoulder Internal Rotation (Apley's) ---        Shoulder External Rotation (Apley's)         Elbow ROM WNL        PASSIVE ROM (supine)         Shoulder Flexion 85 deg  120      Shoulder Abduction 85 deg  100      Shoulder Internal Rotation 50 deg @ 30 abd  55 @ 30 abd      Shoulder External Rotation 10 deg @ 30 abd  46 @ 30 abd      Cervical flexion/extension WFL        Cervical Rotation WFL                   Strength:  Not tested secondary to post operative status x1wk per protocol. Joint: Initial  Date: 12/21/18  Re-Assess Date:  Re-Assess Date:     RIGHT LEFT RIGHT LEFT RIGHT LEFT   Shoulder Flexion ---  --      Shoulder Abduction  (C5) ----  --      Shoulder Internal Rotation   --      Shoulder External Rotation   --      Elbow Flexion  (C6)         Elbow Extension (C7)         Wrist Flexion (C7)         Wrist Extension (C6)         Resisted Thumb Extension/Finger Abduction (C8/T1)         Resisted Cervical Rotation (C1):         Resisted Shoulder Shrug (C2, 3, 4):           Strength                                      Joint Mobility Eval Date: Not test due to post op status  Re-Assess Date: 1/9/19  Re-Assess Date:     RIGHT LEFT RIGHT LEFT RIGHT LEFT   Glenohumeral   Guarded and difficult to get end-feel      Scapulothoracic         Thoracic              Special Tests:      Neurological Screen: Assessed @ Initial Visit    Radiating symptoms? No  Functional Mobility:  Assessed @ Initial Visit ---in brace no pillow  Gait and transfers safe. ADLs independent   Balance: normal       Outcome Measure: Tool Used: Disabilities of the Arm, Shoulder and Hand (DASH) Questionnaire - Quick Version  Score:  Initial: 54/55  Most Recent: 40/55 (Date: -- )   Interpretation of Score: The DASH is designed to measure the activities of daily living in person's with upper extremity dysfunction or pain. Each section is scored on a 1-5 scale, 5 representing the greatest disability. The scores of each section are added together for a total score of 55. Score 11 12-19 20-28 29-37 38-45 46-54 55   Modifier CH CI CJ CK CL CM CN     ?  Carrying, Moving, and Handling Objects:     - CURRENT STATUS: CL - 60%-79% impaired, limited or restricted    - GOAL STATUS: CI - 1%-19% impaired, limited or restricted    - D/C STATUS:  ---------------To be determined---------------     TREATMENT:   (In addition to Assessment/Re-Assessment sessions the following treatments were rendered)  6 weeks post-operative   · Pre-Treatment Pain/ Symptoms: Augustin Aaron  States his arm is a little sore. Still getting woken up at night by sharp pains in shoulder. \"Not sure if I am moving around in my sleep\". \"using [employer] at home to do isometrics\"       THERAPEUTIC EXERCISE: (23 minutes):  Exercises per grid below to improve mobility, strength and balance. Required minimal visual and verbal cues to promote proper body alignment, promote proper body posture and promote proper body mechanics. Progressed resistance, range and repetitions as indicated. Date:  12/21/18 Date:  12/26/18 Date:  12/28/18 Date  1/2/19 Date  1/4/19 Date  1/9/19 Date:  1/14/19 Date  1/16/19 Date  1/21/19   Activity/Exercise Parameters Parameters Parameters         Education Healing process, HEP, POC, PT goals, protocol postioning out of sling as aswell as sleep positions     Re-assessment/ HEP re-education and independence shown with all exercises with minimal cues. Handout provided and pt educated on continued protocol and healing   Sleeping positions with pillows around. Stop doing band isometrics because could cause active use and too early in the healing phase to do that.     Scapular retraction 5x5\"  X 10, 5 sec holds, seated  20x5\" holds seated 20x5\" holds seated 20x5\" holds 20x5\" 20\"x10  20x5\" holds   Assisted pendulums 1 min           Elbow flexion/extension   Supine x 20  20x supine 15x supine        Forearm supination/pronation   X 10           Wrist flex/ext  X 10           Gripping   X 10          walkouts    PROM walk outs from counter pain free 15x for warmup 10x10\" gentle stretch from table  10x10\" PROM 10x10\" PROM   UT stretch     3x30\"       Pulleys      5 mins 8 minutes  6 mins 5 mins   PROM with dowel mandy      ER, Abd and Scaption  ER 10x10\" supine ER 15x10\" supine; Flexion 15x10\" holds supine;    UBE         2 1/2 mins F/ 21/2 mins B for ROM                             [facility]     Manual Therapy Interventions: (20 minutes): . Joint mobilization, Soft tissue mobilization and Manipulation was utilized and necessary because of the patient's restricted joint motion, painful spasm and restricted motion of soft tissue. Done to improve soft tissue tone and elasticity  as well as promote proper joint movement in order to improve overall movement quality  (Used abbreviations: MET - muscle energy technique; PNF - proprioceptive neuromuscular facilitation; NMR - neuromuscular re-education; AP - anterior to posterior; PA - posterior to anterior)   Patient responded well to all manual interventions listed in chart below with no significant increase in pain/symptoms. Improved ROM demonstrated following interventions and decrease in pain scale listed below.                Manual Intervention Date:  12/21/18 Date:  12/26/18 Date:  12/28/18 Date  1/2/19 Date  1/4/19 Date  1/9/19 Date:  1/14/19 Date  1/16/19 Date  1/21/19   Soft tissue mobilization Joint Mobility Parameters Parameters Parameters         Cervical paraspinals, UT, levator scap  Strumming/release  Strumming/passive UT stretch 3 mins Strumming/pec stretch Strumming/pec stretch Strumming/pec stretch Strumming/pec stretch Strumming/pec stretch Strumming/pec stretch/UT release    upslides(cervical) gradeI-II massage           PROM  All planes gh  X 10 all direction to tolerance  X 10 all direction to tolerance X 10 all direction to tolerance X 10 all direction to tolerance X 10 all direction to tolerance All planes  All planes All planes   Gh distraction  Grade II  3 x 20 sec with oscillation  3 x 20 sec with oscillation  3 x 20 sec with oscillation  3 x 20 sec with oscillation 3 x 20 sec with oscillation Performed Grade III Grade III Grade III with oscillation   Shoulder quadrant  Grade II  3 x 30 sec   3 mins 4 min 4 mins 4 minutes 4 mins    Rhythmic stab     Manual isometric 5\" 10x IR/ER Manual isometric 5\" 10x IR/ER  Submax IR/ER supine  Sub max IR/ER supine 10x each 10\" holds Sub max IR/ER supine 10x each 10\" holds                  MODALITIES: (0 minutes):        *  Cold Pack Therapy in order to provide analgesia, relieve muscle spasm and reduce inflammation and edema. .Vasoneupmatic compression performed today for same reasons as cold pack therapy    Location= R shoulder  Skin intact pre and post treatment with no adverse symptoms    Treatment/Session Assessment: Man Carlton educated on regressing from isometrics at home, as he was never told to do those exercises by therapist at home. Pt continues to demonstrate very good ROM with PROM initially 110 to AAROM measures 139 degrees supine flexion, however standing scaption not performed because 150 not demonstrated per protocol. Pt requires a lot of verbal cues for form. AAROM initiated today with no reports of increase in pain or symptoms. Pt provided with handout of all AAROM exercises for HEP. · Post-Treatment Pain/ Symptoms: No c/o's  Pain= 5/10 ·   Compliance with Program/Exercises: Compliant  · Recommendations for next session: Re-evaluation/progress report to be sent to MD for update on patient's progress.      Future Appointments   Date Time Provider Jade Harley   1/24/2019  8:45 AM Juan Luis Osman [facility]   1/28/2019 10:15 AM Juan Luis Osman [facility]   1/31/2019 10:15 AM Juan Luis Osman [facility]   2/4/2019 10:15 AM Nuno VILLALTA [facility]   2/7/2019 10:15 AM Juan Luis Osman [facility]       Total Treatment Duration: 43 min   PT Patient Time In/Time Out  Time In: 1015  Time Out: [facility]

## 2019-01-22 ENCOUNTER — APPOINTMENT (OUTPATIENT)
Dept: PHYSICAL THERAPY | Age: 39
End: 2019-01-22
Payer: OTHER MISCELLANEOUS

## 2019-01-24 ENCOUNTER — HOSPITAL ENCOUNTER (OUTPATIENT)
Dept: PHYSICAL THERAPY | Age: 39
Discharge: HOME | End: 2019-01-24
Payer: OTHER MISCELLANEOUS

## 2019-01-24 ENCOUNTER — APPOINTMENT (OUTPATIENT)
Dept: PHYSICAL THERAPY | Age: 39
End: 2019-01-24
Payer: OTHER MISCELLANEOUS

## 2019-01-24 PROCEDURE — 97110 THERAPEUTIC EXERCISES: CPT

## 2019-01-24 PROCEDURE — 97140 MANUAL THERAPY 1/> REGIONS: CPT

## 2019-01-24 NOTE — PROGRESS NOTES
Roland Wing  : 1980      Payor: Bess Jordan / Plan: [id number] / Product Type: Workers Comp /    [address] at [facility] [address] [facility], [facility], [address] [place] [address]  [phone]   [phone]       Visit Count:10       OUTPATIENT PHYSICAL THERAPY:Daily Note, Re-evaluation and Progress Report 2019    ICD-10: Treatment Diagnosis:    Pain in right shoulder (M25.511)  Incomplete rotator cuff tear or rupture of right shoulder, not specified as traumatic (M75.111)  Impingement syndrome of right shoulder (M75.41)                Precautions/Allergies:   Patient has no known allergies. Fall Risk Score: 1 (? 5 = High Risk)  MD Orders: Eval and Treat  MEDICAL/REFERRING DIAGNOSIS:  Incomplete rotator cuff tear or rupture of right shoulder, not specified as traumatic [M75.111]  Impingement syndrome of right shoulder [M75.41]   DATE OF ONSET: 18  REFERRING PHYSICIAN: Adelso Phillips MD  RETURN PHYSICIAN APPOINTMENT: TBD by patient      Re-evaluation/Progress Report: Roland Wing has attended 10 physical therapy sessions including initial evaluation. He is approximately 6 weeks post operative RTC repair. He has demonstrated an increase in shoulder PROM and progressed to standing AAROM scaption per protocol. He continues to present with decreased strength and ROM, posture and functional mobility secondary to post-op status. Pt will continue to benefit from skilled physical therapy for manual therapeutic techniques (as appropriate), therapeutic exercises and activities, neuromuscular re-education, and comprehensive home exercises program to address current impairments and functional limitations. INITIAL ASSESSMENT:   Mr. Parvin Ordoñez presents to physical therapy s/p R RTC repair on 18. He presents with decreased strength, posture, ROM, joint mobility, functional mobility.  These S/S are consistent with R RTC repair, R shoulder pain and impingement syndrome of R shoulder. Patient will benefit from skilled physical therapy for manual therapeutic techniques (as appropriate), therapeutic exercises and activities, neuromuscular re-education, and comprehensive home exercises program to address current impairments and functional limitations. PROBLEM LIST (Impacting functional limitations):  1. Decreased Strength  2. Decreased ADL/Functional Activities  3. Increased Pain  4. Decreased Activity Tolerance  5. Increased Shortness of Breath  6. Decreased Flexibility/Joint Mobility   Interventions Planned:   1. Cold  2. Manual therapy  3. Therapeutic exercise and activity  4. Vasopuematic compression therapy  5. E-Stim  6. Neuromuscular re-education  7. Education-HEP     TREATMENT PLAN:  Effective Dates: 12/21/18 TO 3/21/2019 (90 days). Frequency/Duration: 2-3 times a week for 90 Days  GOALS: (Goals have been discussed and agreed upon with patient.)  SHORT-TERM FUNCTIONAL GOALS: Time Frame: 4 weeks  1. Baltazar Suzanne will report <=5/10 pain with don/doffing clothing as well as minimal/no difficulty. (met 1/24/19)  2. [place] Suzanne will demonstrate improvement in passive shoulder flexion to >100 degrees to increase UE function and participation in ADLs. (met 1.9.19)  3. Baltazar Suzanne will demonstrate demonstrate improvement in passive shoulder abd to >100 degrees to increase UE function and participation in ADLs. (@100 degrees 1.9.19)  4. [place] Suzanne will show a greater than 8 point decrease on the DASH in order to show an increase in upper extremity function. (met 1/24/19)  5. Baltazar Suzanne will be independent in all HEP (met 1/24/19)    DISCHARGE GOALS: Time Frame: 12 weeks    1. Baltazar Suzanne will show full AROM of the UE in order to return to full functional mobility   2.  Baltazar Suzanne will show a greater than 15 point decrease on the DASH in order to show an increase in upper extremity function  3. Jaymie Locke will report doing hair/bathing without difficulty and <=2/10 pain in order to be independent with ADL's  4. Jaymie Locke will be independent in all advanced HEP     Rehabilitation Potential For Stated Goals: Good              HISTORY:   History of Present Injury/Illness (Reason for Referral):  Jaymie Locke presents s/p R RTC repair on 12/14/18. He states it is a work related injury when boxes fell on his shoulder. 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 Pt reports currently having most difficulty sleeping at night    -Present symptoms/complaints (on day of evaluation)  Pain Scale:  · Current: 10/10  · Best: 10/10  · Worst: 10/10    · Aggravating factors: sleeping, donning/doffing clothing, bathing   · Relieving factors: ice    Dominant Side  Right handed  Past Medical History/Comorbidities:   Mr. Elli Childs  has a past medical history of Chlamydia, Hiatal hernia, High risk sexual behavior, and Urethritis. He also has no past medical history of Adverse effect of anesthesia, Difficult intubation, Malignant hyperthermia due to anesthesia, Nausea & vomiting, or Pseudocholinesterase deficiency. Mr. Elli Childs  has a past surgical history that includes hx hernia repair and hx wisdom teeth extraction. Social History/Living Environment:     Lives with girlfriend   Prior Level of Function/Work/Activity:  Full time at [employer];    Current Medications:    Current Outpatient Medications:     meloxicam (MOBIC) 15 mg tablet, Take 15 mg by mouth daily. , Disp: , Rfl:     traMADol (ULTRAM) 50 mg tablet, Take 1 Tab by mouth every six (6) hours as needed for Pain.  Max Daily Amount: 200 mg., Disp: 24 Tab, Rfl: 0   - Oxycodone (per patient) prn 5mg tablet    Date Last Reviewed:  1/24/2019   EXAMINATION:   Observation/Orthostatic Postural Assessment:          Rounded shoulders  Palpation:          TTP along R UT, anterior and lateral deltoid, levator scap and cervical paraspinals and around surgical site;  Surgical sites demonstrate good healing with appropriate scar formation     ROM:    AROM/PROM         Joint: Initial : 12/26/18  Re-Assess Date:  1/9/19  Re-Assess Date:  1/24/19    ACTIVE ROM (standing) RIGHT LEFT RIGHT LEFT RIGHT LEFT   Shoulder Flexion ---        Shoulder Abduction ---        Shoulder Internal Rotation (Apley's) ---        Shoulder External Rotation (Apley's)         Elbow ROM WNL        PASSIVE ROM (supine)         Shoulder Flexion 85 deg  120  153    Shoulder Abduction 85 deg  100  110    Shoulder Internal Rotation 50 deg @ 30 abd  55 @ 30 abd  55 @60 abd    Shoulder External Rotation 10 deg @ 30 abd  46 @ 30 abd  60 @60 abd    Cervical flexion/extension WFL        Cervical Rotation WFL                   Strength:  Not tested secondary to post operative status x6wk per protocol pt only able to perform AAROM  Joint: Initial  Date: 12/21/18  Re-Assess Date:  Re-Assess Date:     RIGHT LEFT RIGHT LEFT RIGHT LEFT   Shoulder Flexion ---  --  --    Shoulder Abduction  (C5) ----  --  --    Shoulder Internal Rotation   --  --    Shoulder External Rotation   --  --    Elbow Flexion  (C6)     --    Elbow Extension (C7)         Wrist Flexion (C7)         Wrist Extension (C6)         Resisted Thumb Extension/Finger Abduction (C8/T1)         Resisted Cervical Rotation (C1):         Resisted Shoulder Shrug (C2, 3, 4):           Strength                                      Joint Mobility Eval Date: Not test due to post op status  Re-Assess Date: 1/9/19  Re-Assess Date:     RIGHT LEFT RIGHT LEFT RIGHT LEFT   Glenohumeral   Guarded and difficult to get end-feel  Good mobility, slightly guarded    Scapulothoracic         Thoracic              Special Tests:      Neurological Screen: Assessed @ Initial Visit    Radiating symptoms?  No  Functional Mobility:  Assessed @ Initial Visit ---in brace no pillow per MD   Gait and transfers safe. ADLs independent   Balance: normal       Outcome Measure: Tool Used: Disabilities of the Arm, Shoulder and Hand (DASH) Questionnaire - Quick Version  Score:  Initial: 54/55  Most Recent: 39/55 (Date: 1-24-19 )   Interpretation of Score: The DASH is designed to measure the activities of daily living in person's with upper extremity dysfunction or pain. Each section is scored on a 1-5 scale, 5 representing the greatest disability. The scores of each section are added together for a total score of 55. Score 11 12-19 20-28 29-37 38-45 46-54 55   Modifier CH CI CJ CK CL CM CN     ? Carrying, Moving, and Handling Objects:     - CURRENT STATUS: CK - 40%-59% impaired, limited or restricted    - GOAL STATUS: CI - 1%-19% impaired, limited or restricted    - D/C STATUS:  ---------------To be determined---------------     TREATMENT:   (In addition to Assessment/Re-Assessment sessions the following treatments were rendered)  6 weeks post-operative   · Pre-Treatment Pain/ Symptoms: Keke Grant  States \"doing good, sore as usual\". Still having a hard time sleeping  5/10       THERAPEUTIC EXERCISE: (25 minutes):  Exercises per grid below to improve mobility, strength and balance. Required minimal visual and verbal cues to promote proper body alignment, promote proper body posture and promote proper body mechanics. Progressed resistance, range and repetitions as indicated. Date:  12/21/18 Date:  12/26/18 Date:  12/28/18 Date  1/2/19 Date  1/4/19 Date  1/9/19 Date:  1/14/19 Date  1/16/19 Date  1/21/19 Date  1/24/19   Activity/Exercise Parameters Parameters Parameters          Education Healing process, HEP, POC, PT goals, protocol postioning out of sling as aswell as sleep positions     Re-assessment/ HEP re-education and independence shown with all exercises with minimal cues.  Handout provided and pt educated on continued protocol and healing   Sleeping positions with pillows around. Stop doing band isometrics because could cause active use and too early in the healing phase to do that. Scapular retraction 5x5\"  X 10, 5 sec holds, seated  20x5\" holds seated 20x5\" holds seated 20x5\" holds 20x5\" 20\"x10  20x5\" holds 20x5\" holds   Assisted pendulums 1 min            Elbow flexion/extension   Supine x 20  20x supine 15x supine         Forearm supination/pronation   X 10            Wrist flex/ext  X 10            Gripping   X 10           walkouts    PROM walk outs from counter pain free 15x for warmup 10x10\" gentle stretch from table  10x10\" PROM 10x10\" PROM 10x10\" PROM   UT stretch     3x30\"        Pulleys      5 mins 8 minutes  6 mins 5 mins 5 mins   PROM with dowel mandy      ER, Abd and Scaption  ER 10x10\" supine ER 15x10\" supine; Flexion 15x10\" holds supine;  ER 15x10\" supine; Flexion 15x10\" holds supine; AAROM   UBE         2 1/2 mins F/ 21/2 mins B for ROM 3F/3B for ROM   AAROM standing          Scaption 10x5\" to tolerance   Isometric IR/ER          Gentle against ball 10x10\" with verbal cues required     [facility]     Manual Therapy Interventions: (16 minutes): . Joint mobilization, Soft tissue mobilization and Manipulation was utilized and necessary because of the patient's restricted joint motion, painful spasm and restricted motion of soft tissue. Done to improve soft tissue tone and elasticity  as well as promote proper joint movement in order to improve overall movement quality  (Used abbreviations: MET - muscle energy technique; PNF - proprioceptive neuromuscular facilitation; NMR - neuromuscular re-education; AP - anterior to posterior; PA - posterior to anterior)   Patient responded well to all manual interventions listed in chart below with no significant increase in pain/symptoms. Improved ROM demonstrated following interventions and decrease in pain scale listed below.                Manual Intervention Date:  12/21/18 Date:  12/26/18 Date:  12/28/18 Date  1/2/19 Date  1/4/19 Date  1/9/19 Date:  1/14/19 Date  1/16/19 Date  1/21/19 Date  1/24/19   Soft tissue mobilization Joint Mobility Parameters Parameters Parameters          Cervical paraspinals, UT, levator scap  Strumming/release  Strumming/passive UT stretch 3 mins Strumming/pec stretch Strumming/pec stretch Strumming/pec stretch Strumming/pec stretch Strumming/pec stretch Strumming/pec stretch/UT release Strumming/pec stretch/UT release    upslides(cervical) gradeI-II massage            PROM  All planes gh  X 10 all direction to tolerance  X 10 all direction to tolerance X 10 all direction to tolerance X 10 all direction to tolerance X 10 all direction to tolerance All planes  All planes All planes All Planes   Gh distraction  Grade II  3 x 20 sec with oscillation  3 x 20 sec with oscillation  3 x 20 sec with oscillation  3 x 20 sec with oscillation 3 x 20 sec with oscillation Performed Grade III Grade III Grade III with oscillation Grade III oscillation quad 1-2   Shoulder quadrant  Grade II  3 x 30 sec   3 mins 4 min 4 mins 4 minutes 4 mins     Rhythmic stab     Manual isometric 5\" 10x IR/ER Manual isometric 5\" 10x IR/ER  Submax IR/ER supine  Sub max IR/ER supine 10x each 10\" holds Sub max IR/ER supine 10x each 10\" holds                    MODALITIES: (0 minutes):        *  Cold Pack Therapy in order to provide analgesia, relieve muscle spasm and reduce inflammation and edema. .Vasoneupmatic compression performed today for same reasons as cold pack therapy    Location= R shoulder  Skin intact pre and post treatment with no adverse symptoms    Treatment/Session Assessment:See re-evaluation for full details. No pain reported with progression to standing AAROM scaption today per protocol. Isometrics not given for HEP today secondary to significant amount of verbal cues required to maintain only minimal muscle contraction for muscle pumping only.  Pt progressing well with protocol. · Post-Treatment Pain/ Symptoms: No c/o's  Pain= 5/10 ·   Compliance with Program/Exercises: Compliant  · Recommendations for next session: Continue with progression of strength and ROM exercises as tolerated per protocol.      Future Appointments   Date Time Provider Jade Harley   1/28/2019 10:15 AM Jorge Webber [facility]   [facility]/31/2019 10:15 [facility]   2/4/2019 10:15 AM [facility]   2/7/2019 10:15 AM Jorge Webber [facility]       Total Treatment Duration: 41 min   PT Patient Time In/Time Out  Time In: 1264  Time Out: [address]

## 2019-01-25 ENCOUNTER — APPOINTMENT (OUTPATIENT)
Dept: PHYSICAL THERAPY | Age: 39
End: 2019-01-25
Payer: OTHER MISCELLANEOUS

## 2019-01-28 ENCOUNTER — HOSPITAL ENCOUNTER (OUTPATIENT)
Dept: PHYSICAL THERAPY | Age: 39
Discharge: HOME | End: 2019-01-28
Payer: OTHER MISCELLANEOUS

## 2019-01-28 ENCOUNTER — APPOINTMENT (OUTPATIENT)
Dept: PHYSICAL THERAPY | Age: 39
End: 2019-01-28
Payer: OTHER MISCELLANEOUS

## 2019-01-28 PROCEDURE — 97110 THERAPEUTIC EXERCISES: CPT

## 2019-01-28 PROCEDURE — 97140 MANUAL THERAPY 1/> REGIONS: CPT

## 2019-01-28 NOTE — PROGRESS NOTES
Jaymie Dear  : 1980      Payor: [facility] [facility] / Plan: [address] / Product Type: Workers Comp /    [address] [facility], [facility], [address] [place] [address]  [phone]   [phone]       Visit Count:11       OUTPATIENT PHYSICAL THERAPY:Daily Note 2019    ICD-10: Treatment Diagnosis:    Pain in right shoulder (M25.511)  Incomplete rotator cuff tear or rupture of right shoulder, not specified as traumatic (M75.111)  Impingement syndrome of right shoulder (M75.41)                Precautions/Allergies:   Patient has no known allergies. Fall Risk Score: 1 (? 5 = High Risk)  MD Orders: Eval and Treat  MEDICAL/REFERRING DIAGNOSIS:  Incomplete rotator cuff tear or rupture of right shoulder, not specified as traumatic [M75.111]  Impingement syndrome of right shoulder [M75.41]   DATE OF ONSET: 18  REFERRING PHYSICIAN: Nataly Lassiter MD  RETURN PHYSICIAN APPOINTMENT: TBD by patient      Re-evaluation/Progress Report: Jaymie Pror has attended 10 physical therapy sessions including initial evaluation. He is approximately 6 weeks post operative RTC repair. He has demonstrated an increase in shoulder PROM and progressed to standing AAROM scaption per protocol. He continues to present with decreased strength and ROM, posture and functional mobility secondary to post-op status. Pt will continue to benefit from skilled physical therapy for manual therapeutic techniques (as appropriate), therapeutic exercises and activities, neuromuscular re-education, and comprehensive home exercises program to address current impairments and functional limitations. INITIAL ASSESSMENT:   Mr. Elli Childs presents to physical therapy s/p R RTC repair on 18. He presents with decreased strength, posture, ROM, joint mobility, functional mobility.  These S/S are consistent with R RTC repair, R shoulder pain and impingement syndrome of R shoulder. Patient will benefit from skilled physical therapy for manual therapeutic techniques (as appropriate), therapeutic exercises and activities, neuromuscular re-education, and comprehensive home exercises program to address current impairments and functional limitations. PROBLEM LIST (Impacting functional limitations):  1. Decreased Strength  2. Decreased ADL/Functional Activities  3. Increased Pain  4. Decreased Activity Tolerance  5. Increased Shortness of Breath  6. Decreased Flexibility/Joint Mobility   Interventions Planned:   1. Cold  2. Manual therapy  3. Therapeutic exercise and activity  4. Vasopuematic compression therapy  5. E-Stim  6. Neuromuscular re-education  7. Education-HEP     TREATMENT PLAN:  Effective Dates: 12/21/18 TO 3/21/2019 (90 days). Frequency/Duration: 2-3 times a week for 90 Days  GOALS: (Goals have been discussed and agreed upon with patient.)  SHORT-TERM FUNCTIONAL GOALS: Time Frame: 4 weeks  1. [place] Suzanne will report <=5/10 pain with don/doffing clothing as well as minimal/no difficulty. (met 1/24/19)  2. [place] Suzanne will demonstrate improvement in passive shoulder flexion to >100 degrees to increase UE function and participation in ADLs. (met 1.9.19)  3. Baltazar Suzanne will demonstrate demonstrate improvement in passive shoulder abd to >100 degrees to increase UE function and participation in ADLs. (@100 degrees 1.9.19)  4. Baltazar Suzanne will show a greater than 8 point decrease on the DASH in order to show an increase in upper extremity function. (met 1/24/19)  5. Baltazar Suzanne will be independent in all HEP (met 1/24/19)    DISCHARGE GOALS: Time Frame: 12 weeks    1. [place] Suzanne will show full AROM of the UE in order to return to full functional mobility   2.  [place] Suzanne will show a greater than 15 point decrease on the DASH in order to show an increase in upper extremity function  3. Stefany Ballard will report doing hair/bathing without difficulty and <=2/10 pain in order to be independent with ADL's  4. Stefany Ballard will be independent in all advanced HEP     Rehabilitation Potential For Stated Goals: Good              HISTORY:   History of Present Injury/Illness (Reason for Referral):  Stefany Ballard presents s/p R RTC repair on 12/14/18. He states it is a work related injury when boxes fell on his shoulder. 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 Pt reports currently having most difficulty sleeping at night    -Present symptoms/complaints (on day of evaluation)  Pain Scale:  · Current: 10/10  · Best: 10/10  · Worst: 10/10    · Aggravating factors: sleeping, donning/doffing clothing, bathing   · Relieving factors: ice    Dominant Side  Right handed  Past Medical History/Comorbidities:   Mr. Maverick Erickson  has a past medical history of Chlamydia, Hiatal hernia, High risk sexual behavior, and Urethritis. He also has no past medical history of Adverse effect of anesthesia, Difficult intubation, Malignant hyperthermia due to anesthesia, Nausea & vomiting, or Pseudocholinesterase deficiency. Mr. Maverick Erickson  has a past surgical history that includes hx hernia repair and hx wisdom teeth extraction. Social History/Living Environment:     Lives with girlfriend   Prior Level of Function/Work/Activity:  Full time at [employer];    Current Medications:    Current Outpatient Medications:     meloxicam (MOBIC) 15 mg tablet, Take 15 mg by mouth daily. , Disp: , Rfl:     traMADol (ULTRAM) 50 mg tablet, Take 1 Tab by mouth every six (6) hours as needed for Pain.  Max Daily Amount: 200 mg., Disp: 24 Tab, Rfl: 0   - Oxycodone (per patient) prn 5mg tablet    Date Last Reviewed:  1/28/2019   EXAMINATION:   Observation/Orthostatic Postural Assessment:          Rounded shoulders  Palpation:          TTP along R UT, anterior and lateral deltoid, levator scap and cervical paraspinals and around surgical site;  Surgical sites demonstrate good healing with appropriate scar formation     ROM:    AROM/PROM         Joint: Initial : 12/26/18  Re-Assess Date:  1/9/19  Re-Assess Date:  1/24/19    ACTIVE ROM (standing) RIGHT LEFT RIGHT LEFT RIGHT LEFT   Shoulder Flexion ---        Shoulder Abduction ---        Shoulder Internal Rotation (Apley's) ---        Shoulder External Rotation (Apley's)         Elbow ROM WNL        PASSIVE ROM (supine)         Shoulder Flexion 85 deg  120  153    Shoulder Abduction 85 deg  100  110    Shoulder Internal Rotation 50 deg @ 30 abd  55 @ 30 abd  55 @60 abd    Shoulder External Rotation 10 deg @ 30 abd  46 @ 30 abd  60 @60 abd    Cervical flexion/extension WFL        Cervical Rotation WFL                   Strength:  Not tested secondary to post operative status x6wk per protocol pt only able to perform AAROM  Joint: Initial  Date: 12/21/18  Re-Assess Date:  Re-Assess Date:     RIGHT LEFT RIGHT LEFT RIGHT LEFT   Shoulder Flexion ---  --  --    Shoulder Abduction  (C5) ----  --  --    Shoulder Internal Rotation   --  --    Shoulder External Rotation   --  --    Elbow Flexion  (C6)     --    Elbow Extension (C7)         Wrist Flexion (C7)         Wrist Extension (C6)         Resisted Thumb Extension/Finger Abduction (C8/T1)         Resisted Cervical Rotation (C1):         Resisted Shoulder Shrug (C2, 3, 4):           Strength                                      Joint Mobility Eval Date: Not test due to post op status  Re-Assess Date: 1/9/19  Re-Assess Date:     RIGHT LEFT RIGHT LEFT RIGHT LEFT   Glenohumeral   Guarded and difficult to get end-feel  Good mobility, slightly guarded    Scapulothoracic         Thoracic              Special Tests:      Neurological Screen: Assessed @ Initial Visit    Radiating symptoms?  No  Functional Mobility:  Assessed @ Initial Visit ---in brace no pillow per MD   Gait and transfers safe. ADLs independent   Balance: normal       Outcome Measure: Tool Used: Disabilities of the Arm, Shoulder and Hand (DASH) Questionnaire - Quick Version  Score:  Initial: 54/55  Most Recent: 39/55 (Date: 1-24-19 )   Interpretation of Score: The DASH is designed to measure the activities of daily living in person's with upper extremity dysfunction or pain. Each section is scored on a 1-5 scale, 5 representing the greatest disability. The scores of each section are added together for a total score of 55. Score 11 12-19 20-28 29-37 38-45 46-54 55   Modifier CH CI CJ CK CL CM CN     ? Carrying, Moving, and Handling Objects:     - CURRENT STATUS: CK - 40%-59% impaired, limited or restricted    - GOAL STATUS: CI - 1%-19% impaired, limited or restricted    - D/C STATUS:  ---------------To be determined---------------     TREATMENT:   (In addition to Assessment/Re-Assessment sessions the following treatments were rendered)  7 weeks post-operative   · Pre-Treatment Pain/ Symptoms: Benay Mutter  States \"doing alright\"  5/10       THERAPEUTIC EXERCISE: (20 minutes):  Exercises per grid below to improve mobility, strength and balance. Required minimal visual and verbal cues to promote proper body alignment, promote proper body posture and promote proper body mechanics. Progressed resistance, range and repetitions as indicated. Date:  12/21/18 Date:  12/26/18 Date:  12/28/18 Date  1/2/19 Date  1/4/19 Date  1/9/19 Date:  1/14/19 Date  1/16/19 Date  1/21/19 Date  1/24/19 Date  1/28/19   Activity/Exercise Parameters Parameters Parameters           Education Healing process, HEP, POC, PT goals, protocol postioning out of sling as aswell as sleep positions     Re-assessment/ HEP re-education and independence shown with all exercises with minimal cues. Handout provided and pt educated on continued protocol and healing   Sleeping positions with pillows around.  Stop doing band isometrics because could cause active use and too early in the healing phase to do that. Scapular retraction 5x5\"  X 10, 5 sec holds, seated  20x5\" holds seated 20x5\" holds seated 20x5\" holds 20x5\" 20\"x10  20x5\" holds 20x5\" holds    Assisted pendulums 1 min             Elbow flexion/extension   Supine x 20  20x supine 15x supine          Forearm supination/pronation   X 10             Wrist flex/ext  X 10             Gripping   X 10            walkouts    PROM walk outs from counter pain free 15x for warmup 10x10\" gentle stretch from table  10x10\" PROM 10x10\" PROM 10x10\" PROM 10x10\" PROM   UT stretch     3x30\"         Pulleys      5 mins 8 minutes  6 mins 5 mins 5 mins 5 mins   PROM with dowel mandy      ER, Abd and Scaption  ER 10x10\" supine ER 15x10\" supine; Flexion 15x10\" holds supine;  ER 15x10\" supine; Flexion 15x10\" holds supine; AAROM    UBE         2 1/2 mins F/ 21/2 mins B for ROM 3F/3B for ROM 1.5F/1.5B for ROM   AAROM standing          Scaption 10x5\" to tolerance Scaption 15x5\" to tolerance with scap retract/depression cues; ER 15x5\"    Isometric IR/ER          Gentle against ball 10x10\" with verbal cues required Gentle against ball 10x10\" with verbal cues required     [facility] Portal     Manual Therapy Interventions: (20 minutes): . Joint mobilization, Soft tissue mobilization and Manipulation was utilized and necessary because of the patient's restricted joint motion, painful spasm and restricted motion of soft tissue. Done to improve soft tissue tone and elasticity  as well as promote proper joint movement in order to improve overall movement quality  (Used abbreviations: MET - muscle energy technique; PNF - proprioceptive neuromuscular facilitation; NMR - neuromuscular re-education; AP - anterior to posterior; PA - posterior to anterior)   Patient responded well to all manual interventions listed in chart below with no significant increase in pain/symptoms.  Improved ROM demonstrated following interventions and decrease in pain scale listed below. Manual Intervention Date:  12/21/18 Date:  12/26/18 Date:  12/28/18 Date  1/2/19 Date  1/4/19 Date  1/9/19 Date:  1/14/19 Date  1/16/19 Date  1/21/19 Date  1/24/19 Date  1/28/19   Soft tissue mobilization Joint Mobility Parameters Parameters Parameters           Cervical paraspinals, UT, levator scap  Strumming/release  Strumming/passive UT stretch 3 mins Strumming/pec stretch Strumming/pec stretch Strumming/pec stretch Strumming/pec stretch Strumming/pec stretch Strumming/pec stretch/UT release Strumming/pec stretch/UT release strumming/pec stretch/deltoid strumming    upslides(cervical) gradeI-II massage             PROM  All planes gh  X 10 all direction to tolerance  X 10 all direction to tolerance X 10 all direction to tolerance X 10 all direction to tolerance X 10 all direction to tolerance All planes  All planes All planes All Planes All planes passive end stretch to tolerance   Gh distraction  Grade II  3 x 20 sec with oscillation  3 x 20 sec with oscillation  3 x 20 sec with oscillation  3 x 20 sec with oscillation 3 x 20 sec with oscillation Performed Grade III Grade III Grade III with oscillation Grade III oscillation quad 1-2 Grade III oscillation quad 1-2   Shoulder quadrant  Grade II  3 x 30 sec   3 mins 4 min 4 mins 4 minutes 4 mins      Rhythmic stab     Manual isometric 5\" 10x IR/ER Manual isometric 5\" 10x IR/ER  Submax IR/ER supine  Sub max IR/ER supine 10x each 10\" holds Sub max IR/ER supine 10x each 10\" holds                      MODALITIES: (0 minutes):        *  Cold Pack Therapy in order to provide analgesia, relieve muscle spasm and reduce inflammation and edema.  .Vasoneupmatic compression performed today for same reasons as cold pack therapy    Location= R shoulder  Skin intact pre and post treatment with no adverse symptoms    Treatment/Session Assessment:Shoulder PROM flex=151; abd= 110; ER=51; IR=54 post manual interventions today. Pt requires visual, verbal and tactile cues for reducing scapular winging with AAROM scaption raises to tolerance. No pain with exercises today. Moderate endrange abduction pain reported today. · Post-Treatment Pain/ Symptoms: No c/o's  Pain= 5/10 ·   Compliance with Program/Exercises: Compliant  · Recommendations for next session: Continue with progression of strength and ROM exercises as tolerated per protocol.      Future Appointments   Date Time Provider Jade Harley   1/31/2019 10:15 AM Zoila [facility]   [facility]   2/7/2019 10:15 AM [place] [facility]       Total Treatment Duration: 40 min   PT Patient Time In/Time Out  Time In: 1017  Time Out: [place]

## 2019-01-29 ENCOUNTER — APPOINTMENT (OUTPATIENT)
Dept: PHYSICAL THERAPY | Age: 39
End: 2019-01-29
Payer: OTHER MISCELLANEOUS

## 2019-01-31 ENCOUNTER — HOSPITAL ENCOUNTER (OUTPATIENT)
Dept: PHYSICAL THERAPY | Age: 39
Discharge: HOME | End: 2019-01-31
Payer: OTHER MISCELLANEOUS

## 2019-01-31 ENCOUNTER — APPOINTMENT (OUTPATIENT)
Dept: PHYSICAL THERAPY | Age: 39
End: 2019-01-31
Payer: OTHER MISCELLANEOUS

## 2019-01-31 PROCEDURE — 97140 MANUAL THERAPY 1/> REGIONS: CPT

## 2019-01-31 PROCEDURE — 97110 THERAPEUTIC EXERCISES: CPT

## 2019-01-31 NOTE — PROGRESS NOTES
Augustin Aarno  : 1980      Payor: Concepcion Caballero / Plan: [address] / Product Type: Workers Comp /    [address] at [place] [address] [address], [facility], [address] [place] [address]  [phone]   [phone]       Visit Count:11       OUTPATIENT PHYSICAL THERAPY:Daily Note 2019    ICD-10: Treatment Diagnosis:    Pain in right shoulder (M25.511)  Incomplete rotator cuff tear or rupture of right shoulder, not specified as traumatic (M75.111)  Impingement syndrome of right shoulder (M75.41)                Precautions/Allergies:   Patient has no known allergies. Fall Risk Score: 1 (? 5 = High Risk)  MD Orders: Eval and Treat  MEDICAL/REFERRING DIAGNOSIS:  Incomplete rotator cuff tear or rupture of right shoulder, not specified as traumatic [M75.111]  Impingement syndrome of right shoulder [M75.41]   DATE OF ONSET: 18  REFERRING PHYSICIAN: Gideon Mortensen MD  RETURN PHYSICIAN APPOINTMENT: TBD by patient      Re-evaluation/Progress Report: Augustin Aaron has attended 10 physical therapy sessions including initial evaluation. He is approximately 6 weeks post operative RTC repair. He has demonstrated an increase in shoulder PROM and progressed to standing AAROM scaption per protocol. He continues to present with decreased strength and ROM, posture and functional mobility secondary to post-op status. Pt will continue to benefit from skilled physical therapy for manual therapeutic techniques (as appropriate), therapeutic exercises and activities, neuromuscular re-education, and comprehensive home exercises program to address current impairments and functional limitations. INITIAL ASSESSMENT:   Mr. Hu Pike presents to physical therapy s/p R RTC repair on 18. He presents with decreased strength, posture, ROM, joint mobility, functional mobility.  These S/S are consistent with R RTC repair, R shoulder pain and impingement syndrome of R shoulder. Patient will benefit from skilled physical therapy for manual therapeutic techniques (as appropriate), therapeutic exercises and activities, neuromuscular re-education, and comprehensive home exercises program to address current impairments and functional limitations. PROBLEM LIST (Impacting functional limitations):  1. Decreased Strength  2. Decreased ADL/Functional Activities  3. Increased Pain  4. Decreased Activity Tolerance  5. Increased Shortness of Breath  6. Decreased Flexibility/Joint Mobility   Interventions Planned:   1. Cold  2. Manual therapy  3. Therapeutic exercise and activity  4. Vasopuematic compression therapy  5. E-Stim  6. Neuromuscular re-education  7. Education-HEP     TREATMENT PLAN:  Effective Dates: 12/21/18 TO 3/21/2019 (90 days). Frequency/Duration: 2-3 times a week for 90 Days  GOALS: (Goals have been discussed and agreed upon with patient.)  SHORT-TERM FUNCTIONAL GOALS: Time Frame: 4 weeks  1. Joanie Mederos will report <=5/10 pain with don/doffing clothing as well as minimal/no difficulty. (met 1/24/19)  2. Joanie Mederos will demonstrate improvement in passive shoulder flexion to >100 degrees to increase UE function and participation in ADLs. (met 1.9.19)  3. Joanie Mederos will demonstrate demonstrate improvement in passive shoulder abd to >100 degrees to increase UE function and participation in ADLs. (@100 degrees 1.9.19)  4. Joanie Mederos will show a greater than 8 point decrease on the DASH in order to show an increase in upper extremity function. (met 1/24/19)  5. Joanie Mederos will be independent in all HEP (met 1/24/19)    DISCHARGE GOALS: Time Frame: 12 weeks    1. Joanie Mederos will show full AROM of the UE in order to return to full functional mobility   2.  Joanie Mederos will show a greater than 15 point decrease on the DASH in order to show an increase in upper extremity function  3. Tessy Mike will report doing hair/bathing without difficulty and <=2/10 pain in order to be independent with ADL's  4. Tessy Mike will be independent in all advanced HEP     Rehabilitation Potential For Stated Goals: Good              HISTORY:   History of Present Injury/Illness (Reason for Referral):  Tessy Mike presents s/p R RTC repair on 12/14/18. He states it is a work related injury when boxes fell on his shoulder. 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 Pt reports currently having most difficulty sleeping at night    -Present symptoms/complaints (on day of evaluation)  Pain Scale:  · Current: 10/10  · Best: 10/10  · Worst: 10/10    · Aggravating factors: sleeping, donning/doffing clothing, bathing   · Relieving factors: ice    Dominant Side  Right handed  Past Medical History/Comorbidities:   Mr. Mimi Gonzalez  has a past medical history of Chlamydia, Hiatal hernia, High risk sexual behavior, and Urethritis. He also has no past medical history of Adverse effect of anesthesia, Difficult intubation, Malignant hyperthermia due to anesthesia, Nausea & vomiting, or Pseudocholinesterase deficiency. Mr. Mimi Gonzalez  has a past surgical history that includes hx hernia repair and hx wisdom teeth extraction. Social History/Living Environment:     Lives with girlfriend   Prior Level of Function/Work/Activity:  Full time at [employer];    Current Medications:    Current Outpatient Medications:     meloxicam (MOBIC) 15 mg tablet, Take 15 mg by mouth daily. , Disp: , Rfl:     traMADol (ULTRAM) 50 mg tablet, Take 1 Tab by mouth every six (6) hours as needed for Pain.  Max Daily Amount: 200 mg., Disp: 24 Tab, Rfl: 0   - Oxycodone (per patient) prn 5mg tablet    Date Last Reviewed:  1/31/2019   EXAMINATION:   Observation/Orthostatic Postural Assessment:          Rounded shoulders  Palpation:          TTP along R UT, anterior and lateral deltoid, levator scap and cervical paraspinals and around surgical site;  Surgical sites demonstrate good healing with appropriate scar formation     ROM:    AROM/PROM         Joint: Initial : 12/26/18  Re-Assess Date:  1/9/19  Re-Assess Date:  1/24/19    ACTIVE ROM (standing) RIGHT LEFT RIGHT LEFT RIGHT LEFT   Shoulder Flexion ---        Shoulder Abduction ---        Shoulder Internal Rotation (Apley's) ---        Shoulder External Rotation (Apley's)         Elbow ROM WNL        PASSIVE ROM (supine)         Shoulder Flexion 85 deg  120  153    Shoulder Abduction 85 deg  100  110    Shoulder Internal Rotation 50 deg @ 30 abd  55 @ 30 abd  55 @60 abd    Shoulder External Rotation 10 deg @ 30 abd  46 @ 30 abd  60 @60 abd    Cervical flexion/extension WFL        Cervical Rotation WFL                   Strength:  Not tested secondary to post operative status x6wk per protocol pt only able to perform AAROM  Joint: Initial  Date: 12/21/18  Re-Assess Date:  Re-Assess Date:     RIGHT LEFT RIGHT LEFT RIGHT LEFT   Shoulder Flexion ---  --  --    Shoulder Abduction  (C5) ----  --  --    Shoulder Internal Rotation   --  --    Shoulder External Rotation   --  --    Elbow Flexion  (C6)     --    Elbow Extension (C7)         Wrist Flexion (C7)         Wrist Extension (C6)         Resisted Thumb Extension/Finger Abduction (C8/T1)         Resisted Cervical Rotation (C1):         Resisted Shoulder Shrug (C2, 3, 4):           Strength                                      Joint Mobility Eval Date: Not test due to post op status  Re-Assess Date: 1/9/19  Re-Assess Date:     RIGHT LEFT RIGHT LEFT RIGHT LEFT   Glenohumeral   Guarded and difficult to get end-feel  Good mobility, slightly guarded    Scapulothoracic         Thoracic              Special Tests:      Neurological Screen: Assessed @ Initial Visit    Radiating symptoms?  No  Functional Mobility:  Assessed @ Initial Visit ---in brace no pillow per MD   Gait and transfers safe. ADLs independent   Balance: normal       Outcome Measure: Tool Used: Disabilities of the Arm, Shoulder and Hand (DASH) Questionnaire - Quick Version  Score:  Initial: 54/55  Most Recent: 39/55 (Date: 1-24-19 )   Interpretation of Score: The DASH is designed to measure the activities of daily living in person's with upper extremity dysfunction or pain. Each section is scored on a 1-5 scale, 5 representing the greatest disability. The scores of each section are added together for a total score of 55. Score 11 12-19 20-28 29-37 38-45 46-54 55   Modifier CH CI CJ CK CL CM CN     ? Carrying, Moving, and Handling Objects:     - CURRENT STATUS: CK - 40%-59% impaired, limited or restricted    - GOAL STATUS: CI - 1%-19% impaired, limited or restricted    - D/C STATUS:  ---------------To be determined---------------     TREATMENT:   (In addition to Assessment/Re-Assessment sessions the following treatments were rendered)  7 weeks post-operative   · Pre-Treatment Pain/ Symptoms: No sling anymore per MD per pt.   5/10       THERAPEUTIC EXERCISE: (23 minutes):  Exercises per grid below to improve mobility, strength and balance. Required minimal visual and verbal cues to promote proper body alignment, promote proper body posture and promote proper body mechanics. Progressed resistance, range and repetitions as indicated. Date:  12/21/18 Date:  12/26/18 Date  1/9/19 Date:  1/14/19 Date  1/16/19 Date  1/21/19 Date  1/24/19 Date  1/28/19 Date  1/31/19   Activity/Exercise Parameters Parameters          Education Healing process, HEP, POC, PT goals, protocol postioning out of sling as aswell as sleep positions  Re-assessment/ HEP re-education and independence shown with all exercises with minimal cues. Handout provided and pt educated on continued protocol and healing   Sleeping positions with pillows around.  Stop doing band isometrics because could cause active use and too early in the healing phase to do that. Edu for sleeping positions out of the sling   Scapular retraction 5x5\"  X 10, 5 sec holds, seated  20x5\" 20\"x10  20x5\" holds 20x5\" holds     Assisted pendulums 1 min           Elbow flexion/extension   Supine x 20           Forearm supination/pronation   X 10           Wrist flex/ext  X 10           Gripping   X 10          walkouts   10x10\" gentle stretch from table  10x10\" PROM 10x10\" PROM 10x10\" PROM 10x10\" PROM    UT stretch            Pulleys   5 mins 8 minutes  6 mins 5 mins 5 mins 5 mins 5 mins   PROM with dowel mandy   ER, Abd and Scaption  ER 10x10\" supine ER 15x10\" supine; Flexion 15x10\" holds supine;  ER 15x10\" supine; Flexion 15x10\" holds supine; AAROM  AAROM flex/ER 12x10\" holds each supine   UBE      2 1/2 mins F/ 21/2 mins B for ROM 3F/3B for ROM 1.5F/1.5B for ROM 3F/3B for ROM   AAROM standing       Scaption 10x5\" to tolerance Scaption 15x5\" to tolerance with scap retract/depression cues; ER 15x5\"  Scaption 15x5\" to tolerance with scap retract/depression cues; ER 15x5\"    Isometric IR/ER       Gentle against ball 10x10\" with verbal cues required Gentle against ball 10x10\" with verbal cues required Gentle against ball 10x10\" with verbal cues required     [facility]     Manual Therapy Interventions: (20 minutes): . Joint mobilization, Soft tissue mobilization and Manipulation was utilized and necessary because of the patient's restricted joint motion, painful spasm and restricted motion of soft tissue. Done to improve soft tissue tone and elasticity  as well as promote proper joint movement in order to improve overall movement quality  (Used abbreviations: MET - muscle energy technique; PNF - proprioceptive neuromuscular facilitation; NMR - neuromuscular re-education; AP - anterior to posterior; PA - posterior to anterior)   Patient responded well to all manual interventions listed in chart below with no significant increase in pain/symptoms.  Improved ROM demonstrated following interventions and decrease in pain scale listed below. Manual Intervention Date:  12/21/18 Date:  12/26/18 Date:  12/28/18 Date  1/21/19 Date  1/24/19 Date  1/28/19 Date  1/31/19   Soft tissue mobilization Joint Mobility Parameters Parameters Parameters       Cervical paraspinals, UT, levator scap  Strumming/release  Strumming/passive UT stretch 3 mins Strumming/pec stretch/UT release Strumming/pec stretch/UT release strumming/pec stretch/deltoid strumming     upslides(cervical) gradeI-II massage         PROM  All planes gh  X 10 all direction to tolerance  X 10 all direction to tolerance All planes All Planes All planes passive end stretch to tolerance All planes passive end stretch to tolerance   Gh distraction  Grade II  3 x 20 sec with oscillation  3 x 20 sec with oscillation  Grade III with oscillation Grade III oscillation quad 1-2 Grade III oscillation quad 1-2 Grade III oscillation quad 1-2   Shoulder quadrant  Grade II  3 x 30 sec         Rhythmic stab     Sub max IR/ER supine 10x each 10\" holds                   MODALITIES: (0 minutes):        *  Cold Pack Therapy in order to provide analgesia, relieve muscle spasm and reduce inflammation and edema. .Vasoneupmatic compression performed today for same reasons as cold pack therapy    Location= R shoulder  Skin intact pre and post treatment with no adverse symptoms    Treatment/Session Assessment:Continued with AAROM per protocol today and educated pt on sleeping positions being out of the sling. Pt to progress to AROM next visit as tolerated per protocol. No increase in pain today with any interventions, besides end-range stretch    · Post-Treatment Pain/ Symptoms: No c/o's  Pain= 5/10 ·   Compliance with Program/Exercises: Compliant  · Recommendations for next session: Continue with progression of strength and ROM exercises as tolerated per protocol.      Future Appointments   Date Time Provider Jade Harley   2/4/2019 10:15 AM Lucas LUIS [facility]   2/7/2019 10:15 AM Lucas LUIS [facility]       Total Treatment Duration: 40 min   PT Patient Time In/Time Out  Time In: 1017  Time Out: 1287 [facility]

## 2019-02-01 ENCOUNTER — APPOINTMENT (OUTPATIENT)
Dept: PHYSICAL THERAPY | Age: 39
End: 2019-02-01
Payer: OTHER MISCELLANEOUS

## 2019-02-04 ENCOUNTER — HOSPITAL ENCOUNTER (OUTPATIENT)
Dept: PHYSICAL THERAPY | Age: 39
Discharge: HOME | End: 2019-02-04
Payer: OTHER MISCELLANEOUS

## 2019-02-04 PROCEDURE — 97110 THERAPEUTIC EXERCISES: CPT

## 2019-02-04 PROCEDURE — 97140 MANUAL THERAPY 1/> REGIONS: CPT

## 2019-02-04 NOTE — PROGRESS NOTES
Radha Asif  : 1980      Payor: Jose Dupont / Plan: [address] / Product Type: Workers Comp /    [address] [facility], [address], [id number] [place] [address]  [phone]   [phone]       Visit Count:11       OUTPATIENT PHYSICAL THERAPY:Daily Note 2019    ICD-10: Treatment Diagnosis:    Pain in right shoulder (M25.511)  Incomplete rotator cuff tear or rupture of right shoulder, not specified as traumatic (M75.111)  Impingement syndrome of right shoulder (M75.41)                Precautions/Allergies:   Patient has no known allergies. Fall Risk Score: 1 (? 5 = High Risk)  MD Orders: Eval and Treat  MEDICAL/REFERRING DIAGNOSIS:  Incomplete rotator cuff tear or rupture of right shoulder, not specified as traumatic [M75.111]  Impingement syndrome of right shoulder [M75.41]   DATE OF ONSET: 18  REFERRING PHYSICIAN: Otoniel Pacheco MD  RETURN PHYSICIAN APPOINTMENT: TBD by patient      Re-evaluation/Progress Report: Radha Asif has attended 10 physical therapy sessions including initial evaluation. He is approximately 6 weeks post operative RTC repair. He has demonstrated an increase in shoulder PROM and progressed to standing AAROM scaption per protocol. He continues to present with decreased strength and ROM, posture and functional mobility secondary to post-op status. Pt will continue to benefit from skilled physical therapy for manual therapeutic techniques (as appropriate), therapeutic exercises and activities, neuromuscular re-education, and comprehensive home exercises program to address current impairments and functional limitations. INITIAL ASSESSMENT:   Mr. Dalton Henry presents to physical therapy s/p R RTC repair on 18. He presents with decreased strength, posture, ROM, joint mobility, functional mobility.  These S/S are consistent with R RTC repair, R shoulder pain and impingement syndrome of R shoulder. Patient will benefit from skilled physical therapy for manual therapeutic techniques (as appropriate), therapeutic exercises and activities, neuromuscular re-education, and comprehensive home exercises program to address current impairments and functional limitations. PROBLEM LIST (Impacting functional limitations):  1. Decreased Strength  2. Decreased ADL/Functional Activities  3. Increased Pain  4. Decreased Activity Tolerance  5. Increased Shortness of Breath  6. Decreased Flexibility/Joint Mobility   Interventions Planned:   1. Cold  2. Manual therapy  3. Therapeutic exercise and activity  4. Vasopuematic compression therapy  5. E-Stim  6. Neuromuscular re-education  7. Education-HEP     TREATMENT PLAN:  Effective Dates: 12/21/18 TO 3/21/2019 (90 days). Frequency/Duration: 2-3 times a week for 90 Days  GOALS: (Goals have been discussed and agreed upon with patient.)  SHORT-TERM FUNCTIONAL GOALS: Time Frame: 4 weeks  1. Tessy Mike will report <=5/10 pain with don/doffing clothing as well as minimal/no difficulty. (met 1/24/19)  2. Tessy Mike will demonstrate improvement in passive shoulder flexion to >100 degrees to increase UE function and participation in ADLs. (met 1.9.19)  3. Tessy Mike will demonstrate demonstrate improvement in passive shoulder abd to >100 degrees to increase UE function and participation in ADLs. (@100 degrees 1.9.19)  4. Tessy Mike will show a greater than 8 point decrease on the DASH in order to show an increase in upper extremity function. (met 1/24/19)  5. Tessy Mike will be independent in all HEP (met 1/24/19)    DISCHARGE GOALS: Time Frame: 12 weeks    1. Tessy Mike will show full AROM of the UE in order to return to full functional mobility   2.  Tessy Mike will show a greater than 15 point decrease on the DASH in order to show an increase in upper extremity function  3. Joanie Mederos will report doing hair/bathing without difficulty and <=2/10 pain in order to be independent with ADL's  4. Joanie Mederos will be independent in all advanced HEP     Rehabilitation Potential For Stated Goals: Good              HISTORY:   History of Present Injury/Illness (Reason for Referral):  Joanie Mederos presents s/p R RTC repair on 12/14/18. He states it is a work related injury when boxes fell on his shoulder. 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 Pt reports currently having most difficulty sleeping at night    -Present symptoms/complaints (on day of evaluation)  Pain Scale:  · Current: 10/10  · Best: 10/10  · Worst: 10/10    · Aggravating factors: sleeping, donning/doffing clothing, bathing   · Relieving factors: ice    Dominant Side  Right handed  Past Medical History/Comorbidities:   Mr. Britney Schultz  has a past medical history of Chlamydia, Hiatal hernia, High risk sexual behavior, and Urethritis. He also has no past medical history of Adverse effect of anesthesia, Difficult intubation, Malignant hyperthermia due to anesthesia, Nausea & vomiting, or Pseudocholinesterase deficiency. Mr. Britney Schultz  has a past surgical history that includes hx hernia repair and hx wisdom teeth extraction. Social History/Living Environment:     Lives with girlfriend   Prior Level of Function/Work/Activity:  Full time at [employer];    Current Medications:    Current Outpatient Medications:     meloxicam (MOBIC) 15 mg tablet, Take 15 mg by mouth daily. , Disp: , Rfl:     traMADol (ULTRAM) 50 mg tablet, Take 1 Tab by mouth every six (6) hours as needed for Pain.  Max Daily Amount: 200 mg., Disp: 24 Tab, Rfl: 0   - Oxycodone (per patient) prn 5mg tablet    Date Last Reviewed:  2/4/2019   EXAMINATION:   Observation/Orthostatic Postural Assessment:          Rounded shoulders  Palpation:          TTP along R UT, anterior and lateral deltoid, levator scap and cervical paraspinals and around surgical site;  Surgical sites demonstrate good healing with appropriate scar formation     ROM:    AROM/PROM         Joint: Initial : 12/26/18  Re-Assess Date:  1/9/19  Re-Assess Date:  1/24/19    ACTIVE ROM (standing) RIGHT LEFT RIGHT LEFT RIGHT LEFT   Shoulder Flexion ---        Shoulder Abduction ---        Shoulder Internal Rotation (Apley's) ---        Shoulder External Rotation (Apley's)         Elbow ROM WNL        PASSIVE ROM (supine)         Shoulder Flexion 85 deg  120  153    Shoulder Abduction 85 deg  100  110    Shoulder Internal Rotation 50 deg @ 30 abd  55 @ 30 abd  55 @60 abd    Shoulder External Rotation 10 deg @ 30 abd  46 @ 30 abd  60 @60 abd    Cervical flexion/extension WFL        Cervical Rotation WFL                   Strength:  Not tested secondary to post operative status x6wk per protocol pt only able to perform AAROM  Joint: Initial  Date: 12/21/18  Re-Assess Date:  Re-Assess Date:     RIGHT LEFT RIGHT LEFT RIGHT LEFT   Shoulder Flexion ---  --  --    Shoulder Abduction  (C5) ----  --  --    Shoulder Internal Rotation   --  --    Shoulder External Rotation   --  --    Elbow Flexion  (C6)     --    Elbow Extension (C7)         Wrist Flexion (C7)         Wrist Extension (C6)         Resisted Thumb Extension/Finger Abduction (C8/T1)         Resisted Cervical Rotation (C1):         Resisted Shoulder Shrug (C2, 3, 4):           Strength                                      Joint Mobility Eval Date: Not test due to post op status  Re-Assess Date: 1/9/19  Re-Assess Date:     RIGHT LEFT RIGHT LEFT RIGHT LEFT   Glenohumeral   Guarded and difficult to get end-feel  Good mobility, slightly guarded    Scapulothoracic         Thoracic              Special Tests:      Neurological Screen: Assessed @ Initial Visit    Radiating symptoms?  No  Functional Mobility:  Assessed @ Initial Visit ---in brace no pillow per MD   Gait and transfers safe. ADLs independent   Balance: normal       Outcome Measure: Tool Used: Disabilities of the Arm, Shoulder and Hand (DASH) Questionnaire - Quick Version  Score:  Initial: 54/55  Most Recent: 39/55 (Date: 1-24-19 )   Interpretation of Score: The DASH is designed to measure the activities of daily living in person's with upper extremity dysfunction or pain. Each section is scored on a 1-5 scale, 5 representing the greatest disability. The scores of each section are added together for a total score of 55. Score 11 12-19 20-28 29-37 38-45 46-54 55   Modifier CH CI CJ CK CL CM CN     ? Carrying, Moving, and Handling Objects:     - CURRENT STATUS: CK - 40%-59% impaired, limited or restricted    - GOAL STATUS: CI - 1%-19% impaired, limited or restricted    - D/C STATUS:  ---------------To be determined---------------     TREATMENT:   (In addition to Assessment/Re-Assessment sessions the following treatments were rendered)  8 weeks post-operative   · Pre-Treatment Pain/ Symptoms: Had a good weekend. Still just sore  5/10       THERAPEUTIC EXERCISE: (23 minutes):  Exercises per grid below to improve mobility, strength and balance. Required minimal visual and verbal cues to promote proper body alignment, promote proper body posture and promote proper body mechanics. Progressed resistance, range and repetitions as indicated. Date:  12/21/18 Date:  12/26/18 Date  1/9/19 Date:  1/14/19 Date  1/16/19 Date  1/21/19 Date  1/24/19 Date  1/28/19 Date  1/31/19 Date  2/4/19   Activity/Exercise Parameters Parameters           Education Healing process, HEP, POC, PT goals, protocol postioning out of sling as aswell as sleep positions  Re-assessment/ HEP re-education and independence shown with all exercises with minimal cues. Handout provided and pt educated on continued protocol and healing   Sleeping positions with pillows around.  Stop doing band isometrics because could cause active use and too early in the healing phase to do that. Edu for sleeping positions out of the sling    Scapular retraction 5x5\"  X 10, 5 sec holds, seated  20x5\" 20\"x10  20x5\" holds 20x5\" holds      Assisted pendulums 1 min            Elbow flexion/extension   Supine x 20            Forearm supination/pronation   X 10            Wrist flex/ext  X 10            Gripping   X 10           walkouts   10x10\" gentle stretch from table  10x10\" PROM 10x10\" PROM 10x10\" PROM 10x10\" PROM     UT stretch             Pulleys   5 mins 8 minutes  6 mins 5 mins 5 mins 5 mins 5 mins 5 mins   PROM with dowel mandy   ER, Abd and Scaption  ER 10x10\" supine ER 15x10\" supine; Flexion 15x10\" holds supine;  ER 15x10\" supine; Flexion 15x10\" holds supine; AAROM  AAROM flex/ER 12x10\" holds each supine    UBE      2 1/2 mins F/ 21/2 mins B for ROM 3F/3B for ROM 1.5F/1.5B for ROM 3F/3B for ROM 3F/3B   AAROM standing       Scaption 10x5\" to tolerance Scaption 15x5\" to tolerance with scap retract/depression cues; ER 15x5\"  Scaption 15x5\" to tolerance with scap retract/depression cues; ER 15x5\"  Scaption 15x5\" to tolerance with scap retract/depression cues; ER 15x5\"    Isometric IR/ER       Gentle against ball 10x10\" with verbal cues required Gentle against ball 10x10\" with verbal cues required Gentle against ball 10x10\" with verbal cues required    AROM          Supine flexion 10x5\" end stretch-elbows bent; standing to 90 degrees scaption 10x and ER 10x   Wall walks          5x                  [facility]     Manual Therapy Interventions: (15 minutes): . Joint mobilization, Soft tissue mobilization and Manipulation was utilized and necessary because of the patient's restricted joint motion, painful spasm and restricted motion of soft tissue.    Done to improve soft tissue tone and elasticity  as well as promote proper joint movement in order to improve overall movement quality  (Used abbreviations: MET - muscle energy technique; PNF - proprioceptive neuromuscular facilitation; NMR - neuromuscular re-education; AP - anterior to posterior; PA - posterior to anterior)   Patient responded well to all manual interventions listed in chart below with no significant increase in pain/symptoms. Improved ROM demonstrated following interventions and decrease in pain scale listed below. Manual Intervention Date:  12/21/18 Date:  12/26/18 Date:  12/28/18 Date  1/21/19 Date  1/24/19 Date  1/28/19 Date  1/31/19 Date  2/4/19   Soft tissue mobilization Joint Mobility Parameters Parameters Parameters        Cervical paraspinals, UT, levator scap  Strumming/release  Strumming/passive UT stretch 3 mins Strumming/pec stretch/UT release Strumming/pec stretch/UT release strumming/pec stretch/deltoid strumming      upslides(cervical) gradeI-II massage          PROM  All planes gh  X 10 all direction to tolerance  X 10 all direction to tolerance All planes All Planes All planes passive end stretch to tolerance All planes passive end stretch to tolerance All planes passive end stretch to tolerance   Gh distraction  Grade II  3 x 20 sec with oscillation  3 x 20 sec with oscillation  Grade III with oscillation Grade III oscillation quad 1-2 Grade III oscillation quad 1-2 Grade III oscillation quad 1-2 Grade III oscillation quad 1-2   Shoulder quadrant  Grade II  3 x 30 sec          Rhythmic stab     Sub max IR/ER supine 10x each 10\" holds    Arm straight at 90 degrees shldr flex; IR/ER 5-10\" holds- 2 mins                 MODALITIES: (0 minutes):        *  Cold Pack Therapy in order to provide analgesia, relieve muscle spasm and reduce inflammation and edema. .Vasoneupmatic compression performed today for same reasons as cold pack therapy    Location= R shoulder  Skin intact pre and post treatment with no adverse symptoms    Treatment/Session Assessment: Pt required cues with AROM progression for reducing shoulder hiking. HEP progressed to include AROM to tolerance.  Pt educated on not pushing through pain and healing process of his arm. · Post-Treatment Pain/ Symptoms: No c/o's  Pain= 5/10 ·   Compliance with Program/Exercises: Compliant  · Recommendations for next session: Continue with progression of strength and ROM exercises as tolerated per protocol.      Future Appointments   Date Time Provider Jade Harley   2/7/2019 10:15 AM Mitul Vazquez [id number]       Total Treatment Duration: 38 min   PT Patient Time In/Time Out  Time In: 1017  Time Out: [place]

## 2019-02-07 ENCOUNTER — HOSPITAL ENCOUNTER (OUTPATIENT)
Dept: PHYSICAL THERAPY | Age: 39
Discharge: HOME | End: 2019-02-07
Payer: OTHER MISCELLANEOUS

## 2019-02-07 NOTE — PROGRESS NOTES
Radha Asif  : 1980  Primary: [employer]*  Secondary:  [facility] [place] [address]  [address] [place] [address], [address]  [phone]   [phone]        OUTPATIENT DAILY NOTE    NAME/AGE/GENDER: Radha Asif is a 37 y.o. male. DATE: 2019    Patient called to cancel for appointment today due to unable to make it. Will plan to follow up on next scheduled visit.     Zachariah Ag

## 2019-02-12 ENCOUNTER — HOSPITAL ENCOUNTER (OUTPATIENT)
Dept: PHYSICAL THERAPY | Age: 39
Discharge: HOME | End: 2019-02-12
Payer: OTHER MISCELLANEOUS

## 2019-02-12 PROCEDURE — 97140 MANUAL THERAPY 1/> REGIONS: CPT

## 2019-02-12 PROCEDURE — 97110 THERAPEUTIC EXERCISES: CPT

## 2019-02-12 NOTE — PROGRESS NOTES
Wilberto Stewart  : 1980      Payor: Dean Mitchell / Plan: [id number] / Product Type: Workers Comp /    [address] at [place] [address] [address] [facility], [facility], [address] [place] [address]  [phone]   [phone]       Visit Count:11       OUTPATIENT PHYSICAL THERAPY:Daily Note 2019    ICD-10: Treatment Diagnosis:    Pain in right shoulder (M25.511)  Incomplete rotator cuff tear or rupture of right shoulder, not specified as traumatic (M75.111)  Impingement syndrome of right shoulder (M75.41)                Precautions/Allergies:   Patient has no known allergies. Fall Risk Score: 1 (? 5 = High Risk)  MD Orders: Eval and Treat  MEDICAL/REFERRING DIAGNOSIS:  Incomplete rotator cuff tear or rupture of right shoulder, not specified as traumatic [M75.111]  Impingement syndrome of right shoulder [M75.41]   DATE OF ONSET: 18  REFERRING PHYSICIAN: Candy Woody MD  RETURN PHYSICIAN APPOINTMENT: TBD by patient      Re-evaluation/Progress Report: Wilberto Stewart has attended 10 physical therapy sessions including initial evaluation. He is approximately 6 weeks post operative RTC repair. He has demonstrated an increase in shoulder PROM and progressed to standing AAROM scaption per protocol. He continues to present with decreased strength and ROM, posture and functional mobility secondary to post-op status. Pt will continue to benefit from skilled physical therapy for manual therapeutic techniques (as appropriate), therapeutic exercises and activities, neuromuscular re-education, and comprehensive home exercises program to address current impairments and functional limitations. INITIAL ASSESSMENT:   Mr. Estefany Moreno presents to physical therapy s/p R RTC repair on 18. He presents with decreased strength, posture, ROM, joint mobility, functional mobility.  These S/S are consistent with R RTC repair, R shoulder pain and impingement syndrome of R shoulder. Patient will benefit from skilled physical therapy for manual therapeutic techniques (as appropriate), therapeutic exercises and activities, neuromuscular re-education, and comprehensive home exercises program to address current impairments and functional limitations. PROBLEM LIST (Impacting functional limitations):  1. Decreased Strength  2. Decreased ADL/Functional Activities  3. Increased Pain  4. Decreased Activity Tolerance  5. Increased Shortness of Breath  6. Decreased Flexibility/Joint Mobility   Interventions Planned:   1. Cold  2. Manual therapy  3. Therapeutic exercise and activity  4. Vasopuematic compression therapy  5. E-Stim  6. Neuromuscular re-education  7. Education-HEP     TREATMENT PLAN:  Effective Dates: 12/21/18 TO 3/21/2019 (90 days). Frequency/Duration: 2-3 times a week for 90 Days  GOALS: (Goals have been discussed and agreed upon with patient.)  SHORT-TERM FUNCTIONAL GOALS: Time Frame: 4 weeks  1. Janette Daniel will report <=5/10 pain with don/doffing clothing as well as minimal/no difficulty. (met 1/24/19)  2. Arvid Fredy will demonstrate improvement in passive shoulder flexion to >100 degrees to increase UE function and participation in ADLs. (met 1.9.19)  3. Arvid Fredy will demonstrate demonstrate improvement in passive shoulder abd to >100 degrees to increase UE function and participation in ADLs. (@100 degrees 1.9.19)  4. Arvid Fredy will show a greater than 8 point decrease on the DASH in order to show an increase in upper extremity function. (met 1/24/19)  5. Janette Aguiarder will be independent in all HEP (met 1/24/19)    DISCHARGE GOALS: Time Frame: 12 weeks    1. Arvid Fredy will show full AROM of the UE in order to return to full functional mobility   2.  Arvid Fredy will show a greater than 15 point decrease on the DASH in order to show an increase in upper extremity function  3. Maryan Vance will report doing hair/bathing without difficulty and <=2/10 pain in order to be independent with ADL's  4. Maryan Vance will be independent in all advanced HEP     Rehabilitation Potential For Stated Goals: Good              HISTORY:   History of Present Injury/Illness (Reason for Referral):  Maryan Vance presents s/p R RTC repair on 12/14/18. He states it is a work related injury when boxes fell on his shoulder. 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 Pt reports currently having most difficulty sleeping at night    -Present symptoms/complaints (on day of evaluation)  Pain Scale:  · Current: 10/10  · Best: 10/10  · Worst: 10/10    · Aggravating factors: sleeping, donning/doffing clothing, bathing   · Relieving factors: ice    Dominant Side  Right handed  Past Medical History/Comorbidities:   Mr. Terrance Turk  has a past medical history of Chlamydia, Hiatal hernia, High risk sexual behavior, and Urethritis. He also has no past medical history of Adverse effect of anesthesia, Difficult intubation, Malignant hyperthermia due to anesthesia, Nausea & vomiting, or Pseudocholinesterase deficiency. Mr. Terrance Turk  has a past surgical history that includes hx hernia repair and hx wisdom teeth extraction. Social History/Living Environment:     Lives with girlfriend   Prior Level of Function/Work/Activity:  Full time at [employer];    Current Medications:    Current Outpatient Medications:     meloxicam (MOBIC) 15 mg tablet, Take 15 mg by mouth daily. , Disp: , Rfl:     traMADol (ULTRAM) 50 mg tablet, Take 1 Tab by mouth every six (6) hours as needed for Pain.  Max Daily Amount: 200 mg., Disp: 24 Tab, Rfl: 0   - Oxycodone (per patient) prn 5mg tablet    Date Last Reviewed:  2/12/2019   EXAMINATION:   Observation/Orthostatic Postural Assessment:          Rounded shoulders  Palpation:          TTP along R UT, anterior and lateral deltoid, levator scap and cervical paraspinals and around surgical site;  Surgical sites demonstrate good healing with appropriate scar formation     ROM:    AROM/PROM         Joint: Initial : 12/26/18  Re-Assess Date:  1/9/19  Re-Assess Date:  1/24/19    ACTIVE ROM (standing) RIGHT LEFT RIGHT LEFT RIGHT LEFT   Shoulder Flexion ---        Shoulder Abduction ---        Shoulder Internal Rotation (Apley's) ---        Shoulder External Rotation (Apley's)         Elbow ROM WNL        PASSIVE ROM (supine)         Shoulder Flexion 85 deg  120  153    Shoulder Abduction 85 deg  100  110    Shoulder Internal Rotation 50 deg @ 30 abd  55 @ 30 abd  55 @60 abd    Shoulder External Rotation 10 deg @ 30 abd  46 @ 30 abd  60 @60 abd    Cervical flexion/extension WFL        Cervical Rotation WFL                   Strength:  Not tested secondary to post operative status x6wk per protocol pt only able to perform AAROM  Joint: Initial  Date: 12/21/18  Re-Assess Date:  Re-Assess Date:     RIGHT LEFT RIGHT LEFT RIGHT LEFT   Shoulder Flexion ---  --  --    Shoulder Abduction  (C5) ----  --  --    Shoulder Internal Rotation   --  --    Shoulder External Rotation   --  --    Elbow Flexion  (C6)     --    Elbow Extension (C7)         Wrist Flexion (C7)         Wrist Extension (C6)         Resisted Thumb Extension/Finger Abduction (C8/T1)         Resisted Cervical Rotation (C1):         Resisted Shoulder Shrug (C2, 3, 4):           Strength                                      Joint Mobility Eval Date: Not test due to post op status  Re-Assess Date: 1/9/19  Re-Assess Date:     RIGHT LEFT RIGHT LEFT RIGHT LEFT   Glenohumeral   Guarded and difficult to get end-feel  Good mobility, slightly guarded    Scapulothoracic         Thoracic              Special Tests:      Neurological Screen: Assessed @ Initial Visit    Radiating symptoms?  No  Functional Mobility:  Assessed @ Initial Visit ---in brace no pillow per MD   Gait and transfers safe. ADLs independent   Balance: normal       Outcome Measure: Tool Used: Disabilities of the Arm, Shoulder and Hand (DASH) Questionnaire - Quick Version  Score:  Initial: 54/55  Most Recent: 39/55 (Date: 1-24-19 )   Interpretation of Score: The DASH is designed to measure the activities of daily living in person's with upper extremity dysfunction or pain. Each section is scored on a 1-5 scale, 5 representing the greatest disability. The scores of each section are added together for a total score of 55. Score 11 12-19 20-28 29-37 38-45 46-54 55   Modifier CH CI CJ CK CL CM CN     ? Carrying, Moving, and Handling Objects:     - CURRENT STATUS: CK - 40%-59% impaired, limited or restricted    - GOAL STATUS: CI - 1%-19% impaired, limited or restricted    - D/C STATUS:  ---------------To be determined---------------     TREATMENT:   (In addition to Assessment/Re-Assessment sessions the following treatments were rendered)  9 weeks post-operative   · Pre-Treatment Pain/ Symptoms: Had a good weekend. Sore   5/10       THERAPEUTIC EXERCISE: (23 minutes):  Exercises per grid below to improve mobility, strength and balance. Required minimal visual and verbal cues to promote proper body alignment, promote proper body posture and promote proper body mechanics. Progressed resistance, range and repetitions as indicated. Date:  12/21/18 Date:  12/26/18 Date  1/9/19 Date:  1/14/19 Date  1/16/19 Date  1/21/19 Date  1/24/19 Date  1/28/19 Date  1/31/19 Date  2/4/19 Date  2/12/19   Activity/Exercise Parameters Parameters            Education Healing process, HEP, POC, PT goals, protocol postioning out of sling as aswell as sleep positions  Re-assessment/ HEP re-education and independence shown with all exercises with minimal cues. Handout provided and pt educated on continued protocol and healing   Sleeping positions with pillows around.  Stop doing band isometrics because could cause active use and too early in the healing phase to do that. Edu for sleeping positions out of the sling     Scapular retraction 5x5\"  X 10, 5 sec holds, seated  20x5\" 20\"x10  20x5\" holds 20x5\" holds       Assisted pendulums 1 min             Elbow flexion/extension   Supine x 20             Forearm supination/pronation   X 10             Wrist flex/ext  X 10             Gripping   X 10            walkouts   10x10\" gentle stretch from table  10x10\" PROM 10x10\" PROM 10x10\" PROM 10x10\" PROM      UT stretch              Pulleys   5 mins 8 minutes  6 mins 5 mins 5 mins 5 mins 5 mins 5 mins 5 mins   PROM with dowel mandy   ER, Abd and Scaption  ER 10x10\" supine ER 15x10\" supine; Flexion 15x10\" holds supine;  ER 15x10\" supine; Flexion 15x10\" holds supine; AAROM  AAROM flex/ER 12x10\" holds each supine     UBE      2 1/2 mins F/ 21/2 mins B for ROM 3F/3B for ROM 1.5F/1.5B for ROM 3F/3B for ROM 3F/3B 3F/3B   AAROM standing       Scaption 10x5\" to tolerance Scaption 15x5\" to tolerance with scap retract/depression cues; ER 15x5\"  Scaption 15x5\" to tolerance with scap retract/depression cues; ER 15x5\"  Scaption 15x5\" to tolerance with scap retract/depression cues; ER 15x5\"     Isometric IR/ER       Gentle against ball 10x10\" with verbal cues required Gentle against ball 10x10\" with verbal cues required Gentle against ball 10x10\" with verbal cues required     AROM          Supine flexion 10x5\" end stretch-elbows bent; standing to 90 degrees scaption 10x and ER 10x Supine flexion 15x5\" end stretcht; standing to available range degrees scaption 20x    Wall walks          5x Wall slides-20x                    [employer]     Manual Therapy Interventions: (15 minutes): . Joint mobilization, Soft tissue mobilization and Manipulation was utilized and necessary because of the patient's restricted joint motion, painful spasm and restricted motion of soft tissue.    Done to improve soft tissue tone and elasticity  as well as promote proper joint movement in order to improve overall movement quality  (Used abbreviations: MET - muscle energy technique; PNF - proprioceptive neuromuscular facilitation; NMR - neuromuscular re-education; AP - anterior to posterior; PA - posterior to anterior)   Patient responded well to all manual interventions listed in chart below with no significant increase in pain/symptoms. Improved ROM demonstrated following interventions and decrease in pain scale listed below. Manual Intervention Date:  12/21/18 Date:  12/26/18 Date:  12/28/18 Date  1/21/19 Date  1/24/19 Date  1/28/19 Date  1/31/19 Date  2/4/19 Date  2/12/19   Soft tissue mobilization Joint Mobility Parameters Parameters Parameters         Cervical paraspinals, UT, levator scap  Strumming/release  Strumming/passive UT stretch 3 mins Strumming/pec stretch/UT release Strumming/pec stretch/UT release strumming/pec stretch/deltoid strumming       upslides(cervical) gradeI-II massage           PROM  All planes gh  X 10 all direction to tolerance  X 10 all direction to tolerance All planes All Planes All planes passive end stretch to tolerance All planes passive end stretch to tolerance All planes passive end stretch to tolerance All planes passive end stretch to tolerance   Gh distraction  Grade II  3 x 20 sec with oscillation  3 x 20 sec with oscillation  Grade III with oscillation Grade III oscillation quad 1-2 Grade III oscillation quad 1-2 Grade III oscillation quad 1-2 Grade III oscillation quad 1-2 Grade III oscillation quad 1-2   Shoulder quadrant  Grade II  3 x 30 sec           Rhythmic stab     Sub max IR/ER supine 10x each 10\" holds    Arm straight at 90 degrees shldr flex; IR/ER 5-10\" holds- 2 mins Arm straight at 90 degrees shldr flex; IR/ER 5-10\" holds- 2 mins                     MODALITIES: (0 minutes):        *  Cold Pack Therapy in order to provide analgesia, relieve muscle spasm and reduce inflammation and edema.  .Vasoneupmatic compression performed today for same reasons as cold pack therapy    Location= R shoulder  Skin intact pre and post treatment with no adverse symptoms    Treatment/Session Assessment: Improved standing AROM flexion today with cues for reducing shoulder hiking. Pt's motion beginning to improve with active strength. Pt educated on increased reps at home with visual cues   Supine shoulder measurements PROM  Shoulder flex: 156  Shoulder abd: 118  ER @60 abd: 56  [email or web address] abd: 51    · Post-Treatment Pain/ Symptoms: No c/o's  Pain= 5/10 ·   Compliance with Program/Exercises: Compliant  · Recommendations for next session: Continue with progression of strength and ROM exercises as tolerated per protocol.      Future Appointments   Date Time Provider Jade Harley   2/14/2019  4:00 PM [facility]       Total Treatment Duration: 38 min   PT Patient Time In/Time Out  Time In: 9437  Time Out: 9700 Jone Murphy

## 2019-02-14 ENCOUNTER — HOSPITAL ENCOUNTER (OUTPATIENT)
Dept: PHYSICAL THERAPY | Age: 39
End: 2019-02-14
Payer: OTHER MISCELLANEOUS

## 2019-02-18 NOTE — PROGRESS NOTES
Tessy Mike  : 1980      Payor: Herman Martinez / Plan: [address] / Product Type: Workers Comp /    Alon Givens at [address], [address] [facility], [id number] [place] [address]  [phone]   [phone]       Visit Count:11       OUTPATIENT PHYSICAL THERAPY:Daily Note 2019    ICD-10: Treatment Diagnosis:    Pain in right shoulder (M25.511)  Incomplete rotator cuff tear or rupture of right shoulder, not specified as traumatic (M75.111)  Impingement syndrome of right shoulder (M75.41)                Precautions/Allergies:   Patient has no known allergies. Fall Risk Score: 1 (? 5 = High Risk)  MD Orders: Eval and Treat  MEDICAL/REFERRING DIAGNOSIS:  Incomplete rotator cuff tear or rupture of right shoulder, not specified as traumatic [M75.111]  Impingement syndrome of right shoulder [M75.41]   DATE OF ONSET: 18  REFERRING PHYSICIAN: Isaiah Carty MD  RETURN PHYSICIAN APPOINTMENT: TBD by patient      Re-evaluation/Progress Report: Tessy Mike has attended 10 physical therapy sessions including initial evaluation. He is approximately 6 weeks post operative RTC repair. He has demonstrated an increase in shoulder PROM and progressed to standing AAROM scaption per protocol. He continues to present with decreased strength and ROM, posture and functional mobility secondary to post-op status. Pt will continue to benefit from skilled physical therapy for manual therapeutic techniques (as appropriate), therapeutic exercises and activities, neuromuscular re-education, and comprehensive home exercises program to address current impairments and functional limitations. INITIAL ASSESSMENT:   Mr. Mimi Gonzalez presents to physical therapy s/p R RTC repair on 18. He presents with decreased strength, posture, ROM, joint mobility, functional mobility.  These S/S are consistent with R RTC repair, R shoulder pain and impingement syndrome of R shoulder. Patient will benefit from skilled physical therapy for manual therapeutic techniques (as appropriate), therapeutic exercises and activities, neuromuscular re-education, and comprehensive home exercises program to address current impairments and functional limitations. PROBLEM LIST (Impacting functional limitations):  1. Decreased Strength  2. Decreased ADL/Functional Activities  3. Increased Pain  4. Decreased Activity Tolerance  5. Increased Shortness of Breath  6. Decreased Flexibility/Joint Mobility   Interventions Planned:   1. Cold  2. Manual therapy  3. Therapeutic exercise and activity  4. Vasopuematic compression therapy  5. E-Stim  6. Neuromuscular re-education  7. Education-HEP     TREATMENT PLAN:  Effective Dates: 12/21/18 TO 3/21/2019 (90 days). Frequency/Duration: 2-3 times a week for 90 Days  GOALS: (Goals have been discussed and agreed upon with patient.)  SHORT-TERM FUNCTIONAL GOALS: Time Frame: 4 weeks  1. Pepe Flower will report <=5/10 pain with don/doffing clothing as well as minimal/no difficulty. (met 1/24/19)  2. Pepe Flower will demonstrate improvement in passive shoulder flexion to >100 degrees to increase UE function and participation in ADLs. (met 1.9.19)  3. Pepe Flower will demonstrate demonstrate improvement in passive shoulder abd to >100 degrees to increase UE function and participation in ADLs. (@100 degrees 1.9.19)  4. Pepe Flower will show a greater than 8 point decrease on the DASH in order to show an increase in upper extremity function. (met 1/24/19)  5. Pepe Flower will be independent in all HEP (met 1/24/19)    DISCHARGE GOALS: Time Frame: 12 weeks    1. Pepe Flower will show full AROM of the UE in order to return to full functional mobility   2.  Pepe Flower will show a greater than 15 point decrease on the DASH in order to show an increase in upper extremity function  3. Pretty [place]shine will report doing hair/bathing without difficulty and <=2/10 pain in order to be independent with ADL's  4. Pretty [place]shine will be independent in all advanced HEP     Rehabilitation Potential For Stated Goals: Good              HISTORY:   History of Present Injury/Illness (Reason for Referral):  [place] presents s/p R RTC repair on 12/14/18. He states it is a work related injury when boxes fell on his shoulder. 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 Pt reports currently having most difficulty sleeping at night    -Present symptoms/complaints (on day of evaluation)  Pain Scale:  · Current: 10/10  · Best: 10/10  · Worst: 10/10    · Aggravating factors: sleeping, donning/doffing clothing, bathing   · Relieving factors: ice    Dominant Side  Right handed  Past Medical History/Comorbidities:   Mr. Alvarado Arevalo  has a past medical history of Chlamydia, Hiatal hernia, High risk sexual behavior, and Urethritis. He also has no past medical history of Adverse effect of anesthesia, Difficult intubation, Malignant hyperthermia due to anesthesia, Nausea & vomiting, or Pseudocholinesterase deficiency. Mr. Alvarado Arevalo  has a past surgical history that includes hx hernia repair and hx wisdom teeth extraction. Social History/Living Environment:     Lives with girlfriend   Prior Level of Function/Work/Activity:  Full time at [employer];    Current Medications:    Current Outpatient Medications:     meloxicam (MOBIC) 15 mg tablet, Take 15 mg by mouth daily. , Disp: , Rfl:     traMADol (ULTRAM) 50 mg tablet, Take 1 Tab by mouth every six (6) hours as needed for Pain.  Max Daily Amount: 200 mg., Disp: 24 Tab, Rfl: 0   - Oxycodone (per patient) prn 5mg tablet    Date Last Reviewed:  2/20/2019   EXAMINATION:   Observation/Orthostatic Postural Assessment:          Rounded shoulders  Palpation:          TTP along R UT, anterior and lateral deltoid, levator scap and cervical paraspinals and around surgical site;  Surgical sites demonstrate good healing with appropriate scar formation     ROM:    AROM/PROM         Joint: Initial : 12/26/18  Re-Assess Date:  1/9/19  Re-Assess Date:  1/24/19    ACTIVE ROM (standing) RIGHT LEFT RIGHT LEFT RIGHT LEFT   Shoulder Flexion ---        Shoulder Abduction ---        Shoulder Internal Rotation (Apley's) ---        Shoulder External Rotation (Apley's)         Elbow ROM WNL        PASSIVE ROM (supine)         Shoulder Flexion 85 deg  120  153    Shoulder Abduction 85 deg  100  110    Shoulder Internal Rotation 50 deg @ 30 abd  55 @ 30 abd  55 @60 abd    Shoulder External Rotation 10 deg @ 30 abd  46 @ 30 abd  60 @60 abd    Cervical flexion/extension WFL        Cervical Rotation WFL                   Strength:  Not tested secondary to post operative status x6wk per protocol pt only able to perform AAROM  Joint: Initial  Date: 12/21/18  Re-Assess Date:  Re-Assess Date:     RIGHT LEFT RIGHT LEFT RIGHT LEFT   Shoulder Flexion ---  --  --    Shoulder Abduction  (C5) ----  --  --    Shoulder Internal Rotation   --  --    Shoulder External Rotation   --  --    Elbow Flexion  (C6)     --    Elbow Extension (C7)         Wrist Flexion (C7)         Wrist Extension (C6)         Resisted Thumb Extension/Finger Abduction (C8/T1)         Resisted Cervical Rotation (C1):         Resisted Shoulder Shrug (C2, 3, 4):           Strength                                      Joint Mobility Eval Date: Not test due to post op status  Re-Assess Date: 1/9/19  Re-Assess Date:     RIGHT LEFT RIGHT LEFT RIGHT LEFT   Glenohumeral   Guarded and difficult to get end-feel  Good mobility, slightly guarded    Scapulothoracic         Thoracic              Special Tests:      Neurological Screen: Assessed @ Initial Visit    Radiating symptoms?  No  Functional Mobility:  Assessed @ Initial Visit ---in brace no pillow per MD   Gait and transfers safe. ADLs independent   Balance: normal       Outcome Measure: Tool Used: Disabilities of the Arm, Shoulder and Hand (DASH) Questionnaire - Quick Version  Score:  Initial: 54/55  Most Recent: 39/55 (Date: 1-24-19 )   Interpretation of Score: The DASH is designed to measure the activities of daily living in person's with upper extremity dysfunction or pain. Each section is scored on a 1-5 scale, 5 representing the greatest disability. The scores of each section are added together for a total score of 55. Score 11 12-19 20-28 29-37 38-45 46-54 55   Modifier CH CI CJ CK CL CM CN     ? Carrying, Moving, and Handling Objects:     - CURRENT STATUS: CK - 40%-59% impaired, limited or restricted    - GOAL STATUS: CI - 1%-19% impaired, limited or restricted    - D/C STATUS:  ---------------To be determined---------------     TREATMENT:   (In addition to Assessment/Re-Assessment sessions the following treatments were rendered)  8 weeks post-operative   · Pre-Treatment Pain/ Symptoms: Boris Cervantes was offered to be seen on 2/19/19 for an additional treatment session, however he reported that he missed yesterday's appointment due to other things going on. Pt arrives today reporting doing good, but shoulder sore but feels good after stretching out. States was in a jacuzzi a lot over the weekend and thought it would help but was throbbing a lot  5/10       THERAPEUTIC EXERCISE: (20 minutes):  Exercises per grid below to improve mobility, strength and balance. Required minimal visual and verbal cues to promote proper body alignment, promote proper body posture and promote proper body mechanics. Progressed resistance, range and repetitions as indicated.      Date:  12/21/18 Date:  12/26/18 Date  1/9/19 Date:  1/14/19 Date  1/16/19 Date  1/21/19 Date  1/24/19 Date  1/28/19 Date  2/20/19    Activity/Exercise Parameters Parameters           Education Healing process, HEP, POC, PT goals, protocol postioning out of sling as aswell as sleep positions  Re-assessment/ HEP re-education and independence shown with all exercises with minimal cues. Handout provided and pt educated on continued protocol and healing   Sleeping positions with pillows around. Stop doing band isometrics because could cause active use and too early in the healing phase to do that. Ice vs heat for reducing inflammation    Scapular retraction 5x5\"  X 10, 5 sec holds, seated  20x5\" 20\"x10  20x5\" holds 20x5\" holds      Assisted pendulums 1 min            Elbow flexion/extension   Supine x 20            Forearm supination/pronation   X 10            Wrist flex/ext  X 10            Gripping   X 10           walkouts   10x10\" gentle stretch from table  10x10\" PROM 10x10\" PROM 10x10\" PROM 10x10\" PROM     UT stretch             Pulleys   5 mins 8 minutes  6 mins 5 mins 5 mins 5 mins 5 mins *   PROM with dowel mandy   ER, Abd and Scaption  ER 10x10\" supine ER 15x10\" supine; Flexion 15x10\" holds supine;  ER 15x10\" supine; Flexion 15x10\" holds supine; AAROM      UBE      2 1/2 mins F/ 21/2 mins B for ROM 3F/3B for ROM 1.5F/1.5B for ROM 3F/3B *   AAROM standing       Scaption 10x5\" to tolerance Scaption 15x5\" to tolerance with scap retract/depression cues; ER 15x5\"  Standing Scaption/ER 20x with end stretch; dowel mandy *   Isometric IR/ER       Gentle against ball 10x10\" with verbal cues required Gentle against ball 10x10\" with verbal cues required  *   AROM         Seated ER as tolerated 20x with scap retract; supine elevation 20x and seated as tolerated 10x *                                            [facility]     Manual Therapy Interventions: (23 minutes): . Joint mobilization, Soft tissue mobilization and Manipulation was utilized and necessary because of the patient's restricted joint motion, painful spasm and restricted motion of soft tissue.    Done to improve soft tissue tone and elasticity  as well as promote proper joint movement in order to improve overall movement quality  (Used abbreviations: MET - muscle energy technique; PNF - proprioceptive neuromuscular facilitation; NMR - neuromuscular re-education; AP - anterior to posterior; PA - posterior to anterior)   Patient responded well to all manual interventions listed in chart below with no significant increase in pain/symptoms. Improved ROM demonstrated following interventions and decrease in pain scale listed below.                Manual Intervention Date:  12/21/18 Date:  12/26/18 Date:  12/28/18 Date  1/2/19 Date  1/16/19 Date  1/21/19 Date  1/24/19 Date  1/28/19 Date  2/20/19    Soft tissue mobilization Joint Mobility Parameters Parameters Parameters          Cervical paraspinals, UT, levator scap  Strumming/release  Strumming/passive UT stretch 3 mins Strumming/pec stretch Strumming/pec stretch Strumming/pec stretch/UT release Strumming/pec stretch/UT release strumming/pec stretch/deltoid strumming strumming/pec stretch/deltoid strumming     upslides(cervical) gradeI-II massage            PROM  All planes gh  X 10 all direction to tolerance  X 10 all direction to tolerance X 10 all direction to tolerance All planes All planes All Planes All planes passive end stretch to tolerance All planes passive end stretch to tolerance; ER in full abductionas tolerated=full motion    Gh distraction  Grade II  3 x 20 sec with oscillation  3 x 20 sec with oscillation  3 x 20 sec with oscillation  Grade III Grade III with oscillation Grade III oscillation quad 1-2 Grade III oscillation quad 1-2 Grade III oscillation quad 1-3    Shoulder quadrant  Grade II  3 x 30 sec   3 mins 4 mins        Rhythmic stab     Manual isometric 5\" 10x IR/ER Sub max IR/ER supine 10x each 10\" holds Sub max IR/ER supine 10x each 10\" holds        scapulothoracic         Grade IV all directions      MODALITIES: (0 minutes):        *  Cold Pack Therapy in order to provide analgesia, relieve muscle spasm and reduce inflammation and edema. .Vasoneupmatic compression performed today for same reasons as cold pack therapy    Location= R shoulder  Skin intact pre and post treatment with no adverse symptoms    Treatment/Session Assessment: Today focused on AROM in supine and upright position. Shoulder supine AROM flex=130->140 following manual interventions. Pt requires visual, verbal and tactile cues for reducing scapular winging with AROM added today as tolerated for ER and Flexion to HEP. Pt educated on ice vs heat for shoulder and reducing inflammation at this point in the healing process. · Post-Treatment Pain/ Symptoms: No c/o's  Pain= 4/10 ·   Compliance with Program/Exercises: Compliant  · Recommendations for next session: Continue with progression of strength and ROM exercises as tolerated per protocol.      Future Appointments   Date Time Provider Jade Harley   2/22/2019  1:00 PM Sj Silverio DPT [facility]   2/27/2019 10:15 AM [facility]   3/1/2019  1:00 PM [facility]   3/5/2019 11:00 [facility]   3/7/2019 11:00 AM Tangela Winters [facility]       Total Treatment Duration: 43 min   PT Patient Time In/Time Out  Time In: 2451  Time Out: 265 [facility]

## 2019-02-20 ENCOUNTER — HOSPITAL ENCOUNTER (OUTPATIENT)
Dept: PHYSICAL THERAPY | Age: 39
Discharge: HOME | End: 2019-02-20
Payer: OTHER MISCELLANEOUS

## 2019-02-20 PROCEDURE — 97140 MANUAL THERAPY 1/> REGIONS: CPT

## 2019-02-20 PROCEDURE — 97110 THERAPEUTIC EXERCISES: CPT

## 2019-02-22 ENCOUNTER — HOSPITAL ENCOUNTER (OUTPATIENT)
Dept: PHYSICAL THERAPY | Age: 39
Discharge: HOME | End: 2019-02-22
Payer: OTHER MISCELLANEOUS

## 2019-02-22 PROCEDURE — 97110 THERAPEUTIC EXERCISES: CPT

## 2019-02-22 PROCEDURE — 97140 MANUAL THERAPY 1/> REGIONS: CPT

## 2019-02-22 NOTE — PROGRESS NOTES
Saintclair Hoard  : 1980      Payor: [employer] / Plan: [address] / Product Type: Workers Comp /    Sanjana Jennings at [address] [place] [address], [facility], [address] [place] [address]  [phone]   [phone]       Visit Count:11       OUTPATIENT PHYSICAL THERAPY:Daily Note 2019    ICD-10: Treatment Diagnosis:    Pain in right shoulder (M25.511)  Incomplete rotator cuff tear or rupture of right shoulder, not specified as traumatic (M75.111)  Impingement syndrome of right shoulder (M75.41)                Precautions/Allergies:   Patient has no known allergies. Fall Risk Score: 1 (? 5 = High Risk)  MD Orders: Eval and Treat  MEDICAL/REFERRING DIAGNOSIS:  Incomplete rotator cuff tear or rupture of right shoulder, not specified as traumatic [M75.111]  Impingement syndrome of right shoulder [M75.41]   DATE OF ONSET: 18  REFERRING PHYSICIAN: Shonda Gupta MD  RETURN PHYSICIAN APPOINTMENT: TBD by patient      Re-evaluation/Progress Report: Saintclair Hoard has attended 10 physical therapy sessions including initial evaluation. He is approximately 6 weeks post operative RTC repair. He has demonstrated an increase in shoulder PROM and progressed to standing AAROM scaption per protocol. He continues to present with decreased strength and ROM, posture and functional mobility secondary to post-op status. Pt will continue to benefit from skilled physical therapy for manual therapeutic techniques (as appropriate), therapeutic exercises and activities, neuromuscular re-education, and comprehensive home exercises program to address current impairments and functional limitations. INITIAL ASSESSMENT:   Mr. Nidhi Rossi presents to physical therapy s/p R RTC repair on 18. He presents with decreased strength, posture, ROM, joint mobility, functional mobility.  These S/S are consistent with R RTC repair, R shoulder pain and impingement syndrome of R shoulder. Patient will benefit from skilled physical therapy for manual therapeutic techniques (as appropriate), therapeutic exercises and activities, neuromuscular re-education, and comprehensive home exercises program to address current impairments and functional limitations. PROBLEM LIST (Impacting functional limitations):  1. Decreased Strength  2. Decreased ADL/Functional Activities  3. Increased Pain  4. Decreased Activity Tolerance  5. Increased Shortness of Breath  6. Decreased Flexibility/Joint Mobility   Interventions Planned:   1. Cold  2. Manual therapy  3. Therapeutic exercise and activity  4. Vasopuematic compression therapy  5. E-Stim  6. Neuromuscular re-education  7. Education-HEP     TREATMENT PLAN:  Effective Dates: 12/21/18 TO 3/21/2019 (90 days). Frequency/Duration: 2-3 times a week for 90 Days  GOALS: (Goals have been discussed and agreed upon with patient.)  SHORT-TERM FUNCTIONAL GOALS: Time Frame: 4 weeks  1. Janette Daniel will report <=5/10 pain with don/doffing clothing as well as minimal/no difficulty. (met 1/24/19)  2. Arvid Fredy will demonstrate improvement in passive shoulder flexion to >100 degrees to increase UE function and participation in ADLs. (met 1.9.19)  3. Arvid Fredy will demonstrate demonstrate improvement in passive shoulder abd to >100 degrees to increase UE function and participation in ADLs. (@100 degrees 1.9.19)  4. Arvid Fredy will show a greater than 8 point decrease on the DASH in order to show an increase in upper extremity function. (met 1/24/19)  5. Janette Aguiarder will be independent in all HEP (met 1/24/19)    DISCHARGE GOALS: Time Frame: 12 weeks    1. Arvid Fredy will show full AROM of the UE in order to return to full functional mobility   2.  Arvid Fredy will show a greater than 15 point decrease on the DASH in order to show an increase in upper extremity function  3. Hayes Valdez will report doing hair/bathing without difficulty and <=2/10 pain in order to be independent with ADL's  4. Hayes Valdez will be independent in all advanced HEP     Rehabilitation Potential For Stated Goals: Good              HISTORY:   History of Present Injury/Illness (Reason for Referral):  Hayes Valdez presents s/p R RTC repair on 12/14/18. He states it is a work related injury when boxes fell on his shoulder. 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 Pt reports currently having most difficulty sleeping at night    -Present symptoms/complaints (on day of evaluation)  Pain Scale:  · Current: 10/10  · Best: 10/10  · Worst: 10/10    · Aggravating factors: sleeping, donning/doffing clothing, bathing   · Relieving factors: ice    Dominant Side  Right handed  Past Medical History/Comorbidities:   Mr. Inocente Fulton  has a past medical history of Chlamydia, Hiatal hernia, High risk sexual behavior, and Urethritis. He also has no past medical history of Adverse effect of anesthesia, Difficult intubation, Malignant hyperthermia due to anesthesia, Nausea & vomiting, or Pseudocholinesterase deficiency. Mr. Inocente Fulton  has a past surgical history that includes hx hernia repair and hx wisdom teeth extraction. Social History/Living Environment:     Lives with girlfriend   Prior Level of Function/Work/Activity:  Full time at [employer];    Current Medications:    Current Outpatient Medications:     meloxicam (MOBIC) 15 mg tablet, Take 15 mg by mouth daily. , Disp: , Rfl:     traMADol (ULTRAM) 50 mg tablet, Take 1 Tab by mouth every six (6) hours as needed for Pain.  Max Daily Amount: 200 mg., Disp: 24 Tab, Rfl: 0   - Oxycodone (per patient) prn 5mg tablet    Date Last Reviewed:  2/22/2019   EXAMINATION:   Observation/Orthostatic Postural Assessment:          Rounded shoulders  Palpation:          TTP along R UT, anterior and lateral deltoid, levator scap and cervical paraspinals and around surgical site;  Surgical sites demonstrate good healing with appropriate scar formation     ROM:    AROM/PROM         Joint: Initial : 12/26/18  Re-Assess Date:  1/9/19  Re-Assess Date:  1/24/19    ACTIVE ROM (standing) RIGHT LEFT RIGHT LEFT RIGHT LEFT   Shoulder Flexion ---        Shoulder Abduction ---        Shoulder Internal Rotation (Apley's) ---        Shoulder External Rotation (Apley's)         Elbow ROM WNL        PASSIVE ROM (supine)         Shoulder Flexion 85 deg  120  153    Shoulder Abduction 85 deg  100  110    Shoulder Internal Rotation 50 deg @ 30 abd  55 @ 30 abd  55 @60 abd    Shoulder External Rotation 10 deg @ 30 abd  46 @ 30 abd  60 @60 abd    Cervical flexion/extension WFL        Cervical Rotation WFL                   Strength:  Not tested secondary to post operative status x6wk per protocol pt only able to perform AAROM  Joint: Initial  Date: 12/21/18  Re-Assess Date:  Re-Assess Date:     RIGHT LEFT RIGHT LEFT RIGHT LEFT   Shoulder Flexion ---  --  --    Shoulder Abduction  (C5) ----  --  --    Shoulder Internal Rotation   --  --    Shoulder External Rotation   --  --    Elbow Flexion  (C6)     --    Elbow Extension (C7)         Wrist Flexion (C7)         Wrist Extension (C6)         Resisted Thumb Extension/Finger Abduction (C8/T1)         Resisted Cervical Rotation (C1):         Resisted Shoulder Shrug (C2, 3, 4):           Strength                                      Joint Mobility Eval Date: Not test due to post op status  Re-Assess Date: 1/9/19  Re-Assess Date:     RIGHT LEFT RIGHT LEFT RIGHT LEFT   Glenohumeral   Guarded and difficult to get end-feel  Good mobility, slightly guarded    Scapulothoracic         Thoracic              Special Tests:      Neurological Screen: Assessed @ Initial Visit    Radiating symptoms?  No  Functional Mobility:  Assessed @ Initial Visit ---in brace no pillow per MD   Gait and transfers safe. ADLs independent   Balance: normal       Outcome Measure: Tool Used: Disabilities of the Arm, Shoulder and Hand (DASH) Questionnaire - Quick Version  Score:  Initial: 54/55  Most Recent: 39/55 (Date: 1-24-19 )   Interpretation of Score: The DASH is designed to measure the activities of daily living in person's with upper extremity dysfunction or pain. Each section is scored on a 1-5 scale, 5 representing the greatest disability. The scores of each section are added together for a total score of 55. Score 11 12-19 20-28 29-37 38-45 46-54 55   Modifier CH CI CJ CK CL CM CN     ? Carrying, Moving, and Handling Objects:     - CURRENT STATUS: CK - 40%-59% impaired, limited or restricted    - GOAL STATUS: CI - 1%-19% impaired, limited or restricted    - D/C STATUS:  ---------------To be determined---------------     TREATMENT:   (In addition to Assessment/Re-Assessment sessions the following treatments were rendered)  8 weeks post-operative   · Pre-Treatment Pain/ Symptoms: Roland Wing *says he I doing good, no complaints. 5/10 pain prior to session. THERAPEUTIC EXERCISE: (25 minutes):  Exercises per grid below to improve mobility, strength and balance. Required minimal visual and verbal cues to promote proper body alignment, promote proper body posture and promote proper body mechanics. Progressed resistance, range and repetitions as indicated. Date:  12/21/18 Date:  12/26/18 Date  1/9/19 Date:  1/14/19 Date  1/16/19 Date  1/21/19 Date  1/24/19 Date  1/28/19 Date  2/20/19 Date:  2/22/19   Activity/Exercise Parameters Parameters           Education Healing process, HEP, POC, PT goals, protocol postioning out of sling as aswell as sleep positions  Re-assessment/ HEP re-education and independence shown with all exercises with minimal cues. Handout provided and pt educated on continued protocol and healing   Sleeping positions with pillows around.  Stop doing band isometrics because could cause active use and too early in the healing phase to do that. Ice vs heat for reducing inflammation    Scapular retraction 5x5\"  X 10, 5 sec holds, seated  20x5\" 20\"x10  20x5\" holds 20x5\" holds      Assisted pendulums 1 min            Elbow flexion/extension   Supine x 20            Forearm supination/pronation   X 10            Wrist flex/ext  X 10            Gripping   X 10           walkouts   10x10\" gentle stretch from table  10x10\" PROM 10x10\" PROM 10x10\" PROM 10x10\" PROM     UT stretch             Pulleys   5 mins 8 minutes  6 mins 5 mins 5 mins 5 mins 5 mins 5 mins   PROM with dowel mandy   ER, Abd and Scaption  ER 10x10\" supine ER 15x10\" supine; Flexion 15x10\" holds supine;  ER 15x10\" supine; Flexion 15x10\" holds supine; AAROM      UBE      2 1/2 mins F/ 21/2 mins B for ROM 3F/3B for ROM 1.5F/1.5B for ROM 3F/3B 2F 2 B    level 8   AAROM standing       Scaption 10x5\" to tolerance Scaption 15x5\" to tolerance with scap retract/depression cues; ER 15x5\"  Standing Scaption/ER 20x with end stretch; dowel mandy *Standing Scaption/ER 20x with end stretch; dowel mandy   Isometric IR/ER       Gentle against ball 10x10\" with verbal cues required Gentle against ball 10x10\" with verbal cues required  *Gentle against ball 10x10\" with verbal cues required   AROM         Seated ER as tolerated 20x with scap retract; supine elevation 20x and seated as tolerated 10x *Seated ER as tolerated 20x with scap retract; supine elevation 20x and seated as tolerated 20x                                            [facility]     Manual Therapy Interventions: (15 minutes): . Joint mobilization, Soft tissue mobilization and Manipulation was utilized and necessary because of the patient's restricted joint motion, painful spasm and restricted motion of soft tissue.    Done to improve soft tissue tone and elasticity  as well as promote proper joint movement in order to improve overall movement quality  (Used abbreviations: MET - muscle energy technique; PNF - proprioceptive neuromuscular facilitation; NMR - neuromuscular re-education; AP - anterior to posterior; PA - posterior to anterior)   Patient responded well to all manual interventions listed in chart below with no significant increase in pain/symptoms. Improved ROM demonstrated following interventions and decrease in pain scale listed below.                Manual Intervention Date:  12/21/18 Date:  12/26/18 Date:  12/28/18 Date  1/2/19 Date  1/16/19 Date  1/21/19 Date  1/24/19 Date  1/28/19 Date  2/20/19 Date:  2/22/19   Soft tissue mobilization Joint Mobility Parameters Parameters Parameters          Cervical paraspinals, UT, levator scap  Strumming/release  Strumming/passive UT stretch 3 mins Strumming/pec stretch Strumming/pec stretch Strumming/pec stretch/UT release Strumming/pec stretch/UT release strumming/pec stretch/deltoid strumming strumming/pec stretch/deltoid strumming     upslides(cervical) gradeI-II massage            PROM  All planes gh  X 10 all direction to tolerance  X 10 all direction to tolerance X 10 all direction to tolerance All planes All planes All Planes All planes passive end stretch to tolerance All planes passive end stretch to tolerance; ER in full abductionas tolerated=full motion All planes passive end stretch to tolerance; ER in full abductionas tolerated=full motion   Gh distraction  Grade II  3 x 20 sec with oscillation  3 x 20 sec with oscillation  3 x 20 sec with oscillation  Grade III Grade III with oscillation Grade III oscillation quad 1-2 Grade III oscillation quad 1-2 Grade III oscillation quad 1-3 Grade III oscillation quad 1-3   Shoulder quadrant  Grade II  3 x 30 sec   3 mins 4 mins        Rhythmic stab     Manual isometric 5\" 10x IR/ER Sub max IR/ER supine 10x each 10\" holds Sub max IR/ER supine 10x each 10\" holds        scapulothoracic         Grade IV all directions      MODALITIES: (0 minutes): *  Cold Pack Therapy in order to provide analgesia, relieve muscle spasm and reduce inflammation and edema. .Vasoneupmatic compression performed today for same reasons as cold pack therapy    Location= R shoulder  Skin intact pre and post treatment with no adverse symptoms    Treatment/Session Assessment: Today focused on AROM in supine and upright position. Shoulder supine AROM flex=130->140 following manual interventions. Tessy Mike did well with exercises today. Seems to be progressing well--plans to return to primary therapist next session. · Post-Treatment Pain/ Symptoms: No c/o's  Pain= 4/10 ·   Compliance with Program/Exercises: Compliant  · Recommendations for next session: Continue with progression of strength and ROM exercises as tolerated per protocol.      Future Appointments   Date Time Provider Jade Harley   2/27/2019 10:15 AM Mariana Quiñones [facility]   3/1/2019  1:00 PM Mal VILLALTA [facility]   3/5/2019 11:00 AM Mal VILLALTA [facility]   3/7/2019 11:00 AM Mariana Quiñones [facility]       Total Treatment Duration: 40 min   PT Patient Time In/Time Out  Time In: 1300  Time Out: 710 Fm 1960 Per Sykes DPT

## 2019-02-27 ENCOUNTER — HOSPITAL ENCOUNTER (OUTPATIENT)
Dept: PHYSICAL THERAPY | Age: 39
Discharge: HOME | End: 2019-02-27
Payer: OTHER MISCELLANEOUS

## 2019-02-27 PROCEDURE — 97140 MANUAL THERAPY 1/> REGIONS: CPT

## 2019-02-27 PROCEDURE — 97110 THERAPEUTIC EXERCISES: CPT

## 2019-02-27 NOTE — PROGRESS NOTES
Nuno Queen  : 1980      Payor: Zully Nixon / Plan: [id number] / Product Type: Workers Comp /    [address] [facility], [facility], [address] [place] [address]  [phone]   [phone]       Visit Count:11       OUTPATIENT PHYSICAL THERAPY:Daily Note 2019    ICD-10: Treatment Diagnosis:    Pain in right shoulder (M25.511)  Incomplete rotator cuff tear or rupture of right shoulder, not specified as traumatic (M75.111)  Impingement syndrome of right shoulder (M75.41)                Precautions/Allergies:   Patient has no known allergies. Fall Risk Score: 1 (? 5 = High Risk)  MD Orders: Eval and Treat  MEDICAL/REFERRING DIAGNOSIS:  Incomplete rotator cuff tear or rupture of right shoulder, not specified as traumatic [M75.111]  Impingement syndrome of right shoulder [M75.41]   DATE OF ONSET: 18  REFERRING PHYSICIAN: Camden Anand MD  RETURN PHYSICIAN APPOINTMENT: TBD by patient      Re-evaluation/Progress Report: Nuno Queen has attended 10 physical therapy sessions including initial evaluation. He is approximately 6 weeks post operative RTC repair. He has demonstrated an increase in shoulder PROM and progressed to standing AAROM scaption per protocol. He continues to present with decreased strength and ROM, posture and functional mobility secondary to post-op status. Pt will continue to benefit from skilled physical therapy for manual therapeutic techniques (as appropriate), therapeutic exercises and activities, neuromuscular re-education, and comprehensive home exercises program to address current impairments and functional limitations. INITIAL ASSESSMENT:   Mr. Zabrina Jc presents to physical therapy s/p R RTC repair on 18. He presents with decreased strength, posture, ROM, joint mobility, functional mobility.  These S/S are consistent with R RTC repair, R shoulder pain and impingement syndrome of R shoulder. Patient will benefit from skilled physical therapy for manual therapeutic techniques (as appropriate), therapeutic exercises and activities, neuromuscular re-education, and comprehensive home exercises program to address current impairments and functional limitations. PROBLEM LIST (Impacting functional limitations):  1. Decreased Strength  2. Decreased ADL/Functional Activities  3. Increased Pain  4. Decreased Activity Tolerance  5. Increased Shortness of Breath  6. Decreased Flexibility/Joint Mobility   Interventions Planned:   1. Cold  2. Manual therapy  3. Therapeutic exercise and activity  4. Vasopuematic compression therapy  5. E-Stim  6. Neuromuscular re-education  7. Education-HEP     TREATMENT PLAN:  Effective Dates: 12/21/18 TO 3/21/2019 (90 days). Frequency/Duration: 2-3 times a week for 90 Days  GOALS: (Goals have been discussed and agreed upon with patient.)  SHORT-TERM FUNCTIONAL GOALS: Time Frame: 4 weeks  1. Christine Smith will report <=5/10 pain with don/doffing clothing as well as minimal/no difficulty. (met 1/24/19)  2. Christine Dyers will demonstrate improvement in passive shoulder flexion to >100 degrees to increase UE function and participation in ADLs. (met 1.9.19)  3. Christine Dyers will demonstrate demonstrate improvement in passive shoulder abd to >100 degrees to increase UE function and participation in ADLs. (@100 degrees 1.9.19)  4. Christine Dyers will show a greater than 8 point decrease on the DASH in order to show an increase in upper extremity function. (met 1/24/19)  5. Christine Smith will be independent in all HEP (met 1/24/19)    DISCHARGE GOALS: Time Frame: 12 weeks    1. Christine Dyers will show full AROM of the UE in order to return to full functional mobility   2.  Christine Dyers will show a greater than 15 point decrease on the DASH in order to show an increase in upper extremity function  3. Ree Donato will report doing hair/bathing without difficulty and <=2/10 pain in order to be independent with ADL's  4. Ree Donato will be independent in all advanced HEP     Rehabilitation Potential For Stated Goals: Good              HISTORY:   History of Present Injury/Illness (Reason for Referral):  Ree Donato presents s/p R RTC repair on 12/14/18. He states it is a work related injury when boxes fell on his shoulder. 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 Pt reports currently having most difficulty sleeping at night    -Present symptoms/complaints (on day of evaluation)  Pain Scale:  · Current: 10/10  · Best: 10/10  · Worst: 10/10    · Aggravating factors: sleeping, donning/doffing clothing, bathing   · Relieving factors: ice    Dominant Side  Right handed  Past Medical History/Comorbidities:   Mr. Jonathan Calzada  has a past medical history of Chlamydia, Hiatal hernia, High risk sexual behavior, and Urethritis. He also has no past medical history of Adverse effect of anesthesia, Difficult intubation, Malignant hyperthermia due to anesthesia, Nausea & vomiting, or Pseudocholinesterase deficiency. Mr. Jonathan Calzada  has a past surgical history that includes hx hernia repair and hx wisdom teeth extraction. Social History/Living Environment:     Lives with girlfriend   Prior Level of Function/Work/Activity:  Full time at [employer];    Current Medications:    Current Outpatient Medications:     meloxicam (MOBIC) 15 mg tablet, Take 15 mg by mouth daily. , Disp: , Rfl:     traMADol (ULTRAM) 50 mg tablet, Take 1 Tab by mouth every six (6) hours as needed for Pain.  Max Daily Amount: 200 mg., Disp: 24 Tab, Rfl: 0   - Oxycodone (per patient) prn 5mg tablet    Date Last Reviewed:  2/27/2019   EXAMINATION:   Observation/Orthostatic Postural Assessment:          Rounded shoulders  Palpation:          TTP along R UT, anterior and lateral deltoid, levator scap and cervical paraspinals and around surgical site;  Surgical sites demonstrate good healing with appropriate scar formation     ROM:    AROM/PROM         Joint: Initial : 12/26/18  Re-Assess Date:  1/9/19  Re-Assess Date:  1/24/19    ACTIVE ROM (standing) RIGHT LEFT RIGHT LEFT RIGHT LEFT   Shoulder Flexion ---        Shoulder Abduction ---        Shoulder Internal Rotation (Apley's) ---        Shoulder External Rotation (Apley's)         Elbow ROM WNL        PASSIVE ROM (supine)         Shoulder Flexion 85 deg  120  153    Shoulder Abduction 85 deg  100  110    Shoulder Internal Rotation 50 deg @ 30 abd  55 @ 30 abd  55 @60 abd    Shoulder External Rotation 10 deg @ 30 abd  46 @ 30 abd  60 @60 abd    Cervical flexion/extension WFL        Cervical Rotation WFL                   Strength:  Not tested secondary to post operative status x6wk per protocol pt only able to perform AAROM  Joint: Initial  Date: 12/21/18  Re-Assess Date:  Re-Assess Date:     RIGHT LEFT RIGHT LEFT RIGHT LEFT   Shoulder Flexion ---  --  --    Shoulder Abduction  (C5) ----  --  --    Shoulder Internal Rotation   --  --    Shoulder External Rotation   --  --    Elbow Flexion  (C6)     --    Elbow Extension (C7)         Wrist Flexion (C7)         Wrist Extension (C6)         Resisted Thumb Extension/Finger Abduction (C8/T1)         Resisted Cervical Rotation (C1):         Resisted Shoulder Shrug (C2, 3, 4):           Strength                                      Joint Mobility Eval Date: Not test due to post op status  Re-Assess Date: 1/9/19  Re-Assess Date:     RIGHT LEFT RIGHT LEFT RIGHT LEFT   Glenohumeral   Guarded and difficult to get end-feel  Good mobility, slightly guarded    Scapulothoracic         Thoracic              Special Tests:      Neurological Screen: Assessed @ Initial Visit    Radiating symptoms?  No  Functional Mobility:  Assessed @ Initial Visit ---in brace no pillow per MD   Gait and transfers safe. ADLs independent   Balance: normal       Outcome Measure: Tool Used: Disabilities of the Arm, Shoulder and Hand (DASH) Questionnaire - Quick Version  Score:  Initial: 54/55  Most Recent: 39/55 (Date: 1-24-19 )   Interpretation of Score: The DASH is designed to measure the activities of daily living in person's with upper extremity dysfunction or pain. Each section is scored on a 1-5 scale, 5 representing the greatest disability. The scores of each section are added together for a total score of 55. Score 11 12-19 20-28 29-37 38-45 46-54 55   Modifier CH CI CJ CK CL CM CN     ? Carrying, Moving, and Handling Objects:     - CURRENT STATUS: CK - 40%-59% impaired, limited or restricted    - GOAL STATUS: CI - 1%-19% impaired, limited or restricted    - D/C STATUS:  ---------------To be determined---------------     TREATMENT:   (In addition to Assessment/Re-Assessment sessions the following treatments were rendered)  9 weeks post-operative   · Pre-Treatment Pain/ Symptoms: Adalgisa Ricardo *says stiff today  5/10 pain prior to session. THERAPEUTIC EXERCISE: (25 minutes):  Exercises per grid below to improve mobility, strength and balance. Required minimal visual and verbal cues to promote proper body alignment, promote proper body posture and promote proper body mechanics. Progressed resistance, range and repetitions as indicated. Date:  12/21/18 Date:  12/26/18 Date:  1/14/19 Date  1/16/19 Date  1/21/19 Date  1/24/19 Date  1/28/19 Date  2/20/19 Date:  2/22/19 Date  2/27/19   Activity/Exercise Parameters Parameters           Education Healing process, HEP, POC, PT goals, protocol postioning out of sling as aswell as sleep positions    Sleeping positions with pillows around. Stop doing band isometrics because could cause active use and too early in the healing phase to do that.     Ice vs heat for reducing inflammation     Scapular retraction 5x5\"  X 10, 5 sec holds, seated  20\"x10  20x5\" holds 20x5\" holds       Assisted pendulums 1 min            Elbow flexion/extension   Supine x 20            Forearm supination/pronation   X 10            Wrist flex/ext  X 10            Gripping   X 10           walkouts    10x10\" PROM 10x10\" PROM 10x10\" PROM 10x10\" PROM      UT stretch             Pulleys   8 minutes  6 mins 5 mins 5 mins 5 mins 5 mins 5 mins 5 mins cool down   PROM with dowel mandy    ER 10x10\" supine ER 15x10\" supine; Flexion 15x10\" holds supine;  ER 15x10\" supine; Flexion 15x10\" holds supine; AAROM       UBE     2 1/2 mins F/ 21/2 mins B for ROM 3F/3B for ROM 1.5F/1.5B for ROM 3F/3B 2F 2 B    level 8 3F/2B level 2   AAROM standing      Scaption 10x5\" to tolerance Scaption 15x5\" to tolerance with scap retract/depression cues; ER 15x5\"  Standing Scaption/ER 20x with end stretch; dowel mandy *Standing Scaption/ER 20x with end stretch; dowel mandy Standing scaption/ER/IR 5x with 20\" end stretch; dowel mandy   Isometric IR/ER      Gentle against ball 10x10\" with verbal cues required Gentle against ball 10x10\" with verbal cues required  *Gentle against ball 10x10\" with verbal cues required    AROM        Seated ER as tolerated 20x with scap retract; supine elevation 20x and seated as tolerated 10x *Seated ER as tolerated 20x with scap retract; supine elevation 20x and seated as tolerated 20x Supine, 30 degree incline, and seated upright; 10x5\" stretch each flexion for endurance training. - elbows bent for 30 degree position                                            [facility] Portal     Manual Therapy Interventions: (15 minutes): . Joint mobilization, Soft tissue mobilization and Manipulation was utilized and necessary because of the patient's restricted joint motion, painful spasm and restricted motion of soft tissue.    Done to improve soft tissue tone and elasticity  as well as promote proper joint movement in order to improve overall movement quality  (Used abbreviations: MET - muscle energy technique; PNF - proprioceptive neuromuscular facilitation; NMR - neuromuscular re-education; AP - anterior to posterior; PA - posterior to anterior)   Patient responded well to all manual interventions listed in chart below with no significant increase in pain/symptoms. Improved ROM demonstrated following interventions and decrease in pain scale listed below.                Manual Intervention Date:  12/21/18 Date:  12/28/18 Date  1/2/19 Date  1/16/19 Date  1/21/19 Date  1/24/19 Date  1/28/19 Date  2/20/19 Date:  2/22/19 Date  2/27/19   Soft tissue mobilization Joint Mobility Parameters Parameters           Cervical paraspinals, UT, levator scap  Strumming/release Strumming/passive UT stretch 3 mins Strumming/pec stretch Strumming/pec stretch Strumming/pec stretch/UT release Strumming/pec stretch/UT release strumming/pec stretch/deltoid strumming strumming/pec stretch/deltoid strumming  strumming/pec stretch/deltoid strumming    upslides(cervical) gradeI-II massage            PROM  All planes gh  X 10 all direction to tolerance X 10 all direction to tolerance All planes All planes All Planes All planes passive end stretch to tolerance All planes passive end stretch to tolerance; ER in full abductionas tolerated=full motion All planes passive end stretch to tolerance; ER in full abductionas tolerated=full motion All planes passive end stretch to tolerance; ER in full abductionas tolerated=full motion   Gh distraction  Grade II  3 x 20 sec with oscillation  3 x 20 sec with oscillation  Grade III Grade III with oscillation Grade III oscillation quad 1-2 Grade III oscillation quad 1-2 Grade III oscillation quad 1-3 Grade III oscillation quad 1-3 Grade III oscillation quad 1-3   Shoulder quadrant  Grade II   3 mins 4 mins         Rhythmic stab    Manual isometric 5\" 10x IR/ER Sub max IR/ER supine 10x each 10\" holds Sub max IR/ER supine 10x each 10\" holds         scapulothoracic        Grade IV all directions       MODALITIES: (0 minutes):        *  Cold Pack Therapy in order to provide analgesia, relieve muscle spasm and reduce inflammation and edema. .Vasoneupmatic compression performed today for same reasons as cold pack therapy    Location= R shoulder  Skin intact pre and post treatment with no adverse symptoms    Treatment/Session Assessment: Today focused on AROM endurance strengthening in 3 positions. Shoulder supine AROM flex=130->142 following manual interventions. Brody Art did well with exercises today. IR stretching added today. ROM and strength with AROM progressing well. · Post-Treatment Pain/ Symptoms: No c/o's  Pain= 3/10 ·   Compliance with Program/Exercises: Compliant  · Recommendations for next session: Continue with progression of strength and ROM exercises as tolerated per protocol.      Future Appointments   Date Time Provider Jade Harley   3/1/2019  1:00 PM [facility]   3/5/2019 11:00 AM Wilmer VILLALTA [facility]   3/7/2019 11:00 AM [facility]       Total Treatment Duration: 40 min   PT Patient Time In/Time Out  Time In: 1020  Time Out: [facility]

## 2019-03-01 ENCOUNTER — HOSPITAL ENCOUNTER (OUTPATIENT)
Dept: PHYSICAL THERAPY | Age: 39
Discharge: HOME | End: 2019-03-01
Payer: OTHER MISCELLANEOUS

## 2019-03-01 PROCEDURE — 97110 THERAPEUTIC EXERCISES: CPT

## 2019-03-01 PROCEDURE — 97140 MANUAL THERAPY 1/> REGIONS: CPT

## 2019-03-01 NOTE — PROGRESS NOTES
Yenni Contreras  : 1980      Payor: [id number] / Plan: [id number] / Product Type: Workers Comp /    Ritika Calderon at [facility], [facility], [id number] [place] [address]  [phone]   [phone]       Visit Count:11       OUTPATIENT PHYSICAL THERAPY:Daily Note 3/1/2019    ICD-10: Treatment Diagnosis:    Pain in right shoulder (M25.511)  Incomplete rotator cuff tear or rupture of right shoulder, not specified as traumatic (M75.111)  Impingement syndrome of right shoulder (M75.41)                Precautions/Allergies:   Patient has no known allergies. Fall Risk Score: 1 (? 5 = High Risk)  MD Orders: Eval and Treat  MEDICAL/REFERRING DIAGNOSIS:  Incomplete rotator cuff tear or rupture of right shoulder, not specified as traumatic [M75.111]  Impingement syndrome of right shoulder [M75.41]   DATE OF ONSET: 18  REFERRING PHYSICIAN: Felipa Nicolas MD  RETURN PHYSICIAN APPOINTMENT: TBD by patient      Re-evaluation/Progress Report: Yenni Contreras has attended 10 physical therapy sessions including initial evaluation. He is approximately 6 weeks post operative RTC repair. He has demonstrated an increase in shoulder PROM and progressed to standing AAROM scaption per protocol. He continues to present with decreased strength and ROM, posture and functional mobility secondary to post-op status. Pt will continue to benefit from skilled physical therapy for manual therapeutic techniques (as appropriate), therapeutic exercises and activities, neuromuscular re-education, and comprehensive home exercises program to address current impairments and functional limitations. INITIAL ASSESSMENT:   Mr. Amna Saldaña presents to physical therapy s/p R RTC repair on 18. He presents with decreased strength, posture, ROM, joint mobility, functional mobility.  These S/S are consistent with R RTC repair, R shoulder pain and impingement syndrome of R shoulder. Patient will benefit from skilled physical therapy for manual therapeutic techniques (as appropriate), therapeutic exercises and activities, neuromuscular re-education, and comprehensive home exercises program to address current impairments and functional limitations. PROBLEM LIST (Impacting functional limitations):  1. Decreased Strength  2. Decreased ADL/Functional Activities  3. Increased Pain  4. Decreased Activity Tolerance  5. Increased Shortness of Breath  6. Decreased Flexibility/Joint Mobility   Interventions Planned:   1. Cold  2. Manual therapy  3. Therapeutic exercise and activity  4. Vasopuematic compression therapy  5. E-Stim  6. Neuromuscular re-education  7. Education-HEP     TREATMENT PLAN:  Effective Dates: 12/21/18 TO 3/21/2019 (90 days). Frequency/Duration: 2-3 times a week for 90 Days  GOALS: (Goals have been discussed and agreed upon with patient.)  SHORT-TERM FUNCTIONAL GOALS: Time Frame: 4 weeks  1. Anne-Marie Stands will report <=5/10 pain with don/doffing clothing as well as minimal/no difficulty. (met 1/24/19)  2. Anne-Marie Stands will demonstrate improvement in passive shoulder flexion to >100 degrees to increase UE function and participation in ADLs. (met 1.9.19)  3. Anne-Marie Stands will demonstrate demonstrate improvement in passive shoulder abd to >100 degrees to increase UE function and participation in ADLs. (@100 degrees 1.9.19)  4. Anne-Marie Stands will show a greater than 8 point decrease on the DASH in order to show an increase in upper extremity function. (met 1/24/19)  5. Anne-Marie Stands will be independent in all HEP (met 1/24/19)    DISCHARGE GOALS: Time Frame: 12 weeks    1. Anne-Marie Stands will show full AROM of the UE in order to return to full functional mobility   2.  Anne-Marie Stands will show a greater than 15 point decrease on the DASH in order to show an increase in upper extremity function  3. Keke Grant will report doing hair/bathing without difficulty and <=2/10 pain in order to be independent with ADL's  4. Keke Grant will be independent in all advanced HEP     Rehabilitation Potential For Stated Goals: Good              HISTORY:   History of Present Injury/Illness (Reason for Referral):  eKke Grant presents s/p R RTC repair on 12/14/18. He states it is a work related injury when boxes fell on his shoulder. 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 Pt reports currently having most difficulty sleeping at night    -Present symptoms/complaints (on day of evaluation)  Pain Scale:  · Current: 10/10  · Best: 10/10  · Worst: 10/10    · Aggravating factors: sleeping, donning/doffing clothing, bathing   · Relieving factors: ice    Dominant Side  Right handed  Past Medical History/Comorbidities:   Mr. Joshua Martinez  has a past medical history of Chlamydia, Hiatal hernia, High risk sexual behavior, and Urethritis. He also has no past medical history of Adverse effect of anesthesia, Difficult intubation, Malignant hyperthermia due to anesthesia, Nausea & vomiting, or Pseudocholinesterase deficiency. Mr. Joshua Martinez  has a past surgical history that includes hx hernia repair and hx wisdom teeth extraction. Social History/Living Environment:     Lives with girlfriend   Prior Level of Function/Work/Activity:  Full time at [employer];    Current Medications:    Current Outpatient Medications:     meloxicam (MOBIC) 15 mg tablet, Take 15 mg by mouth daily. , Disp: , Rfl:     traMADol (ULTRAM) 50 mg tablet, Take 1 Tab by mouth every six (6) hours as needed for Pain.  Max Daily Amount: 200 mg., Disp: 24 Tab, Rfl: 0   - Oxycodone (per patient) prn 5mg tablet    Date Last Reviewed:  3/1/2019   EXAMINATION:   Observation/Orthostatic Postural Assessment:          Rounded shoulders  Palpation:          TTP along R UT, anterior and lateral deltoid, levator scap and cervical paraspinals and around surgical site;  Surgical sites demonstrate good healing with appropriate scar formation     ROM:    AROM/PROM         Joint: Initial : 12/26/18  Re-Assess Date:  1/9/19  Re-Assess Date:  1/24/19    ACTIVE ROM (standing) RIGHT LEFT RIGHT LEFT RIGHT LEFT   Shoulder Flexion ---        Shoulder Abduction ---        Shoulder Internal Rotation (Apley's) ---        Shoulder External Rotation (Apley's)         Elbow ROM WNL        PASSIVE ROM (supine)         Shoulder Flexion 85 deg  120  153    Shoulder Abduction 85 deg  100  110    Shoulder Internal Rotation 50 deg @ 30 abd  55 @ 30 abd  55 @60 abd    Shoulder External Rotation 10 deg @ 30 abd  46 @ 30 abd  60 @60 abd    Cervical flexion/extension WFL        Cervical Rotation WFL                   Strength:  Not tested secondary to post operative status x6wk per protocol pt only able to perform AAROM  Joint: Initial  Date: 12/21/18  Re-Assess Date:  Re-Assess Date:     RIGHT LEFT RIGHT LEFT RIGHT LEFT   Shoulder Flexion ---  --  --    Shoulder Abduction  (C5) ----  --  --    Shoulder Internal Rotation   --  --    Shoulder External Rotation   --  --    Elbow Flexion  (C6)     --    Elbow Extension (C7)         Wrist Flexion (C7)         Wrist Extension (C6)         Resisted Thumb Extension/Finger Abduction (C8/T1)         Resisted Cervical Rotation (C1):         Resisted Shoulder Shrug (C2, 3, 4):           Strength                                      Joint Mobility Eval Date: Not test due to post op status  Re-Assess Date: 1/9/19  Re-Assess Date:     RIGHT LEFT RIGHT LEFT RIGHT LEFT   Glenohumeral   Guarded and difficult to get end-feel  Good mobility, slightly guarded    Scapulothoracic         Thoracic              Special Tests:      Neurological Screen: Assessed @ Initial Visit    Radiating symptoms?  No  Functional Mobility:  Assessed @ Initial Visit ---in brace no pillow per MD   Gait and transfers safe. ADLs independent   Balance: normal       Outcome Measure: Tool Used: Disabilities of the Arm, Shoulder and Hand (DASH) Questionnaire - Quick Version  Score:  Initial: 54/55  Most Recent: 39/55 (Date: 1-24-19 )   Interpretation of Score: The DASH is designed to measure the activities of daily living in person's with upper extremity dysfunction or pain. Each section is scored on a 1-5 scale, 5 representing the greatest disability. The scores of each section are added together for a total score of 55. Score 11 12-19 20-28 29-37 38-45 46-54 55   Modifier CH CI CJ CK CL CM CN     ? Carrying, Moving, and Handling Objects:     - CURRENT STATUS: CK - 40%-59% impaired, limited or restricted    - GOAL STATUS: CI - 1%-19% impaired, limited or restricted    - D/C STATUS:  ---------------To be determined---------------     TREATMENT:   (In addition to Assessment/Re-Assessment sessions the following treatments were rendered)  9 weeks post-operative   · Pre-Treatment Pain/ Symptoms: Philip Austin states \" doing alright today\"  5/10 pain prior to session. THERAPEUTIC EXERCISE: (30 minutes):  Exercises per grid below to improve mobility, strength and balance. Required minimal visual and verbal cues to promote proper body alignment, promote proper body posture and promote proper body mechanics. Progressed resistance, range and repetitions as indicated. Date:  12/21/18 Date:  12/26/18 Date:  1/14/19 Date  1/16/19 Date  1/21/19 Date  1/24/19 Date  1/28/19 Date  2/20/19 Date:  2/22/19 Date  2/27/19 Date  3/1/19   Activity/Exercise Parameters Parameters            Education Healing process, HEP, POC, PT goals, protocol postioning out of sling as aswell as sleep positions    Sleeping positions with pillows around. Stop doing band isometrics because could cause active use and too early in the healing phase to do that.     Ice vs heat for reducing inflammation      Scapular retraction 5x5\"  X 10, 5 sec holds, seated  20\"x10  20x5\" holds 20x5\" holds     20x5\" holds red band   Assisted pendulums 1 min             Elbow flexion/extension   Supine x 20             Forearm supination/pronation   X 10             Wrist flex/ext  X 10             Gripping   X 10            walkouts    10x10\" PROM 10x10\" PROM 10x10\" PROM 10x10\" PROM    10x10\"   UT stretch              Pulleys   8 minutes  6 mins 5 mins 5 mins 5 mins 5 mins 5 mins 5 mins cool down    PROM with dowel mandy    ER 10x10\" supine ER 15x10\" supine; Flexion 15x10\" holds supine;  ER 15x10\" supine; Flexion 15x10\" holds supine; AAROM        UBE     2 1/2 mins F/ 21/2 mins B for ROM 3F/3B for ROM 1.5F/1.5B for ROM 3F/3B 2F 2 B    level 8 3F/2B level 2 3F/3B level 2   AAROM standing      Scaption 10x5\" to tolerance Scaption 15x5\" to tolerance with scap retract/depression cues; ER 15x5\"  Standing Scaption/ER 20x with end stretch; dowel mandy *Standing Scaption/ER 20x with end stretch; dowel mandy Standing scaption/ER/IR 5x with 20\" end stretch; dowel mandy    Isometric IR/ER      Gentle against ball 10x10\" with verbal cues required Gentle against ball 10x10\" with verbal cues required  *Gentle against ball 10x10\" with verbal cues required  And flex/ext with elbow bent and straight supine for muscle activation- 8mins (10-20\" holds)   AROM        Seated ER as tolerated 20x with scap retract; supine elevation 20x and seated as tolerated 10x *Seated ER as tolerated 20x with scap retract; supine elevation 20x and seated as tolerated 20x Supine, 30 degree incline, and seated upright; 10x5\" stretch each flexion for endurance training. - elbows bent for 30 degree position Supine, 30 degree incline, and seated upright; 10x5\" stretch each flexion for endurance training. - elbows bent for 30 degree position   SA punch           20x                                 [facility]     Manual Therapy Interventions: (15 minutes): . Joint mobilization, Soft tissue mobilization and Manipulation was utilized and necessary because of the patient's restricted joint motion, painful spasm and restricted motion of soft tissue. Done to improve soft tissue tone and elasticity  as well as promote proper joint movement in order to improve overall movement quality  (Used abbreviations: MET - muscle energy technique; PNF - proprioceptive neuromuscular facilitation; NMR - neuromuscular re-education; AP - anterior to posterior; PA - posterior to anterior)   Patient responded well to all manual interventions listed in chart below with no significant increase in pain/symptoms. Improved ROM demonstrated following interventions and decrease in pain scale listed below.                Manual Intervention Date:  12/21/18 Date:  12/28/18 Date  1/2/19 Date  1/16/19 Date  1/21/19 Date  1/24/19 Date  1/28/19 Date  2/20/19 Date:  2/22/19 Date  2/27/19 Date  3/1/19   Soft tissue mobilization Joint Mobility Parameters Parameters            Cervical paraspinals, UT, levator scap  Strumming/release Strumming/passive UT stretch 3 mins Strumming/pec stretch Strumming/pec stretch Strumming/pec stretch/UT release Strumming/pec stretch/UT release strumming/pec stretch/deltoid strumming strumming/pec stretch/deltoid strumming  strumming/pec stretch/deltoid strumming strumming/pec stretch/deltoid strumming    upslides(cervical) gradeI-II massage             PROM  All planes gh  X 10 all direction to tolerance X 10 all direction to tolerance All planes All planes All Planes All planes passive end stretch to tolerance All planes passive end stretch to tolerance; ER in full abductionas tolerated=full motion All planes passive end stretch to tolerance; ER in full abductionas tolerated=full motion All planes passive end stretch to tolerance; ER in full abductionas tolerated=full motion All planes passive end stretch to tolerance; ER in full abductionas tolerated=full motion   Gh distraction  Grade II  3 x 20 sec with oscillation  3 x 20 sec with oscillation  Grade III Grade III with oscillation Grade III oscillation quad 1-2 Grade III oscillation quad 1-2 Grade III oscillation quad 1-3 Grade III oscillation quad 1-3 Grade III oscillation quad 1-3 Grade III oscillation quad 1-3   Shoulder quadrant  Grade II   3 mins 4 mins          Rhythmic stab    Manual isometric 5\" 10x IR/ER Sub max IR/ER supine 10x each 10\" holds Sub max IR/ER supine 10x each 10\" holds          scapulothoracic        Grade IV all directions        MODALITIES: (0 minutes):        *  Cold Pack Therapy in order to provide analgesia, relieve muscle spasm and reduce inflammation and edema. .Vasoneupmatic compression performed today for same reasons as cold pack therapy    Location= R shoulder  Skin intact pre and post treatment with no adverse symptoms    Treatment/Session Assessment: Today focused on isometric muscular contractions for improving muscle activation. Improved muscle endurance with scaption in 3 positions today. Resisted scapular retraction added with no increase in pain. · Post-Treatment Pain/ Symptoms: No c/o's  Pain= 3/10 ·   Compliance with Program/Exercises: Compliant  · Recommendations for next session: Continue with progression of strength and ROM exercises as tolerated per protocol.      Future Appointments   Date Time Provider Jade Harley   [facility]   3/7/2019 11:00 AM [facility] [place] FERNÁNDEZOSPOOJA [facility]       Total Treatment Duration: 50 min   PT Patient Time In/Time Out  Time In: 1300  Time Out: 109 [facility]

## 2019-03-05 ENCOUNTER — HOSPITAL ENCOUNTER (OUTPATIENT)
Dept: PHYSICAL THERAPY | Age: 39
Discharge: HOME | End: 2019-03-05
Payer: OTHER MISCELLANEOUS

## 2019-03-05 PROCEDURE — 97110 THERAPEUTIC EXERCISES: CPT

## 2019-03-05 PROCEDURE — 97140 MANUAL THERAPY 1/> REGIONS: CPT

## 2019-03-05 NOTE — PROGRESS NOTES
Man Carlton  : 1980      Payor: Alex Rajesh / Plan: [address] / Product Type: Workers Comp /    [address] at [address] [facility], [facility], [address] [place] [address]  [phone]   [phone]       Visit Count:11       OUTPATIENT PHYSICAL THERAPY:Daily Note 3/5/2019    ICD-10: Treatment Diagnosis:    Pain in right shoulder (M25.511)  Incomplete rotator cuff tear or rupture of right shoulder, not specified as traumatic (M75.111)  Impingement syndrome of right shoulder (M75.41)                Precautions/Allergies:   Patient has no known allergies. Fall Risk Score: 1 (? 5 = High Risk)  MD Orders: Eval and Treat  MEDICAL/REFERRING DIAGNOSIS:  Incomplete rotator cuff tear or rupture of right shoulder, not specified as traumatic [M75.111]  Impingement syndrome of right shoulder [M75.41]   DATE OF ONSET: 18  REFERRING PHYSICIAN: Jasen Paulson MD  RETURN PHYSICIAN APPOINTMENT: TBD by patient      Re-evaluation/Progress Report: Man Carlton has attended 10 physical therapy sessions including initial evaluation. He is approximately 6 weeks post operative RTC repair. He has demonstrated an increase in shoulder PROM and progressed to standing AAROM scaption per protocol. He continues to present with decreased strength and ROM, posture and functional mobility secondary to post-op status. Pt will continue to benefit from skilled physical therapy for manual therapeutic techniques (as appropriate), therapeutic exercises and activities, neuromuscular re-education, and comprehensive home exercises program to address current impairments and functional limitations. INITIAL ASSESSMENT:   Mr. Nikolai Frausto presents to physical therapy s/p R RTC repair on 18. He presents with decreased strength, posture, ROM, joint mobility, functional mobility.  These S/S are consistent with R RTC repair, R shoulder pain and impingement syndrome of R shoulder. Patient will benefit from skilled physical therapy for manual therapeutic techniques (as appropriate), therapeutic exercises and activities, neuromuscular re-education, and comprehensive home exercises program to address current impairments and functional limitations. PROBLEM LIST (Impacting functional limitations):  1. Decreased Strength  2. Decreased ADL/Functional Activities  3. Increased Pain  4. Decreased Activity Tolerance  5. Increased Shortness of Breath  6. Decreased Flexibility/Joint Mobility   Interventions Planned:   1. Cold  2. Manual therapy  3. Therapeutic exercise and activity  4. Vasopuematic compression therapy  5. E-Stim  6. Neuromuscular re-education  7. Education-HEP     TREATMENT PLAN:  Effective Dates: 12/21/18 TO 3/21/2019 (90 days). Frequency/Duration: 2-3 times a week for 90 Days  GOALS: (Goals have been discussed and agreed upon with patient.)  SHORT-TERM FUNCTIONAL GOALS: Time Frame: 4 weeks  1. Radha Asif will report <=5/10 pain with don/doffing clothing as well as minimal/no difficulty. (met 1/24/19)  2. Radha Asif will demonstrate improvement in passive shoulder flexion to >100 degrees to increase UE function and participation in ADLs. (met 1.9.19)  3. Radha Asif will demonstrate demonstrate improvement in passive shoulder abd to >100 degrees to increase UE function and participation in ADLs. (@100 degrees 1.9.19)  4. Radha Asif will show a greater than 8 point decrease on the DASH in order to show an increase in upper extremity function. (met 1/24/19)  5. Radha Asif will be independent in all HEP (met 1/24/19)    DISCHARGE GOALS: Time Frame: 12 weeks    1. Radha Asif will show full AROM of the UE in order to return to full functional mobility   2.  Radha Asfi will show a greater than 15 point decrease on the DASH in order to show an increase in upper extremity function  3. Terrell Larry will report doing hair/bathing without difficulty and <=2/10 pain in order to be independent with ADL's  4. Terrell Larry will be independent in all advanced HEP     Rehabilitation Potential For Stated Goals: Good              HISTORY:   History of Present Injury/Illness (Reason for Referral):  Terrell Larry presents s/p R RTC repair on 12/14/18. He states it is a work related injury when boxes fell on his shoulder. 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 Pt reports currently having most difficulty sleeping at night    -Present symptoms/complaints (on day of evaluation)  Pain Scale:  · Current: 10/10  · Best: 10/10  · Worst: 10/10    · Aggravating factors: sleeping, donning/doffing clothing, bathing   · Relieving factors: ice    Dominant Side  Right handed  Past Medical History/Comorbidities:   Mr. Wendy King  has a past medical history of Chlamydia, Hiatal hernia, High risk sexual behavior, and Urethritis. He also has no past medical history of Adverse effect of anesthesia, Difficult intubation, Malignant hyperthermia due to anesthesia, Nausea & vomiting, or Pseudocholinesterase deficiency. Mr. Wendy King  has a past surgical history that includes hx hernia repair and hx wisdom teeth extraction. Social History/Living Environment:     Lives with girlfriend   Prior Level of Function/Work/Activity:  Full time at [employer];    Current Medications:    Current Outpatient Medications:     meloxicam (MOBIC) 15 mg tablet, Take 15 mg by mouth daily. , Disp: , Rfl:     traMADol (ULTRAM) 50 mg tablet, Take 1 Tab by mouth every six (6) hours as needed for Pain.  Max Daily Amount: 200 mg., Disp: 24 Tab, Rfl: 0   - Oxycodone (per patient) prn 5mg tablet    Date Last Reviewed:  3/5/2019   EXAMINATION:   Observation/Orthostatic Postural Assessment:          Rounded shoulders  Palpation:          TTP along R UT, anterior and lateral deltoid, levator scap and cervical paraspinals and around surgical site;  Surgical sites demonstrate good healing with appropriate scar formation     ROM:    AROM/PROM         Joint: Initial : 12/26/18  Re-Assess Date:  1/9/19  Re-Assess Date:  1/24/19    ACTIVE ROM (standing) RIGHT LEFT RIGHT LEFT RIGHT LEFT   Shoulder Flexion ---        Shoulder Abduction ---        Shoulder Internal Rotation (Apley's) ---        Shoulder External Rotation (Apley's)         Elbow ROM WNL        PASSIVE ROM (supine)         Shoulder Flexion 85 deg  120  153    Shoulder Abduction 85 deg  100  110    Shoulder Internal Rotation 50 deg @ 30 abd  55 @ 30 abd  55 @60 abd    Shoulder External Rotation 10 deg @ 30 abd  46 @ 30 abd  60 @60 abd    Cervical flexion/extension WFL        Cervical Rotation WFL                   Strength:  Not tested secondary to post operative status x6wk per protocol pt only able to perform AAROM  Joint: Initial  Date: 12/21/18  Re-Assess Date:  Re-Assess Date:     RIGHT LEFT RIGHT LEFT RIGHT LEFT   Shoulder Flexion ---  --  --    Shoulder Abduction  (C5) ----  --  --    Shoulder Internal Rotation   --  --    Shoulder External Rotation   --  --    Elbow Flexion  (C6)     --    Elbow Extension (C7)         Wrist Flexion (C7)         Wrist Extension (C6)         Resisted Thumb Extension/Finger Abduction (C8/T1)         Resisted Cervical Rotation (C1):         Resisted Shoulder Shrug (C2, 3, 4):           Strength                                      Joint Mobility Eval Date: Not test due to post op status  Re-Assess Date: 1/9/19  Re-Assess Date:     RIGHT LEFT RIGHT LEFT RIGHT LEFT   Glenohumeral   Guarded and difficult to get end-feel  Good mobility, slightly guarded    Scapulothoracic         Thoracic              Special Tests:      Neurological Screen: Assessed @ Initial Visit    Radiating symptoms?  No  Functional Mobility:  Assessed @ Initial Visit ---in brace no pillow per MD   Gait and transfers safe. ADLs independent   Balance: normal       Outcome Measure: Tool Used: Disabilities of the Arm, Shoulder and Hand (DASH) Questionnaire - Quick Version  Score:  Initial: 54/55  Most Recent: 39/55 (Date: 1-24-19 )   Interpretation of Score: The DASH is designed to measure the activities of daily living in person's with upper extremity dysfunction or pain. Each section is scored on a 1-5 scale, 5 representing the greatest disability. The scores of each section are added together for a total score of 55. Score 11 12-19 20-28 29-37 38-45 46-54 55   Modifier CH CI CJ CK CL CM CN     ? Carrying, Moving, and Handling Objects:     - CURRENT STATUS: CK - 40%-59% impaired, limited or restricted    - GOAL STATUS: CI - 1%-19% impaired, limited or restricted    - D/C STATUS:  ---------------To be determined---------------     TREATMENT:   (In addition to Assessment/Re-Assessment sessions the following treatments were rendered)  9 weeks post-operative   · Pre-Treatment Pain/ Symptoms: Pepe Flower states \" doing good\"  5/10 pain prior to session. THERAPEUTIC EXERCISE: (30 minutes):  Exercises per grid below to improve mobility, strength and balance. Required minimal visual and verbal cues to promote proper body alignment, promote proper body posture and promote proper body mechanics. Progressed resistance, range and repetitions as indicated. Date:  12/21/18 Date:  12/26/18 Date  1/16/19 Date  1/21/19 Date  1/24/19 Date  1/28/19 Date  2/20/19 Date:  2/22/19 Date  2/27/19 Date  3/1/19 Date  3/5/19   Activity/Exercise Parameters Parameters            Education Healing process, HEP, POC, PT goals, protocol postioning out of sling as aswell as sleep positions   Sleeping positions with pillows around. Stop doing band isometrics because could cause active use and too early in the healing phase to do that.     Ice vs heat for reducing inflammation       Scapular retraction 5x5\"  X 10, 5 sec holds, seated   20x5\" holds 20x5\" holds     20x5\" holds red band 20x5\" holds red band   Assisted pendulums 1 min             Elbow flexion/extension   Supine x 20             Forearm supination/pronation   X 10             Wrist flex/ext  X 10             Gripping   X 10            walkouts   10x10\" PROM 10x10\" PROM 10x10\" PROM 10x10\" PROM    10x10\" 10x10\"   UT stretch              Pulleys   6 mins 5 mins 5 mins 5 mins 5 mins 5 mins 5 mins cool down     PROM with dowel mandy   ER 10x10\" supine ER 15x10\" supine; Flexion 15x10\" holds supine;  ER 15x10\" supine; Flexion 15x10\" holds supine; AAROM         UBE    2 1/2 mins F/ 21/2 mins B for ROM 3F/3B for ROM 1.5F/1.5B for ROM 3F/3B 2F 2 B    level 8 3F/2B level 2 3F/3B level 2 3F/3B level 4   AAROM standing     Scaption 10x5\" to tolerance Scaption 15x5\" to tolerance with scap retract/depression cues; ER 15x5\"  Standing Scaption/ER 20x with end stretch; dowel mandy *Standing Scaption/ER 20x with end stretch; dowel mandy Standing scaption/ER/IR 5x with 20\" end stretch; dowel mandy     Isometric IR/ER     Gentle against ball 10x10\" with verbal cues required Gentle against ball 10x10\" with verbal cues required  *Gentle against ball 10x10\" with verbal cues required  And flex/ext with elbow bent and straight supine for muscle activation- 8mins (10-20\" holds) Red band 10\" holds   AROM       Seated ER as tolerated 20x with scap retract; supine elevation 20x and seated as tolerated 10x *Seated ER as tolerated 20x with scap retract; supine elevation 20x and seated as tolerated 20x Supine, 30 degree incline, and seated upright; 10x5\" stretch each flexion for endurance training. - elbows bent for 30 degree position Supine, 30 degree incline, and seated upright; 10x5\" stretch each flexion for endurance training. - elbows bent for 30 degree position Standing with visual cues 20x   SA punch          20x    Shoulder ext           Red band 20x5\"   punch           Red band 20x   scap retract with ER           Against wall 10x10\"                   [facility] Portal     Manual Therapy Interventions: (10 minutes): . Joint mobilization, Soft tissue mobilization and Manipulation was utilized and necessary because of the patient's restricted joint motion, painful spasm and restricted motion of soft tissue. Done to improve soft tissue tone and elasticity  as well as promote proper joint movement in order to improve overall movement quality  (Used abbreviations: MET - muscle energy technique; PNF - proprioceptive neuromuscular facilitation; NMR - neuromuscular re-education; AP - anterior to posterior; PA - posterior to anterior)   Patient responded well to all manual interventions listed in chart below with no significant increase in pain/symptoms. Improved ROM demonstrated following interventions and decrease in pain scale listed below.                Manual Intervention Date:  12/21/18 Date:  12/28/18 Date  1/2/19 Date  1/16/19 Date  1/21/19 Date  1/24/19 Date  1/28/19 Date  2/20/19 Date:  2/22/19 Date  2/27/19 Date  3/1/19 Date  3/5/19   Soft tissue mobilization Joint Mobility Parameters Parameters             Cervical paraspinals, UT, levator scap  Strumming/release Strumming/passive UT stretch 3 mins Strumming/pec stretch Strumming/pec stretch Strumming/pec stretch/UT release Strumming/pec stretch/UT release strumming/pec stretch/deltoid strumming strumming/pec stretch/deltoid strumming  strumming/pec stretch/deltoid strumming strumming/pec stretch/deltoid strumming strumming/pec stretch/deltoid strumming    upslides(cervical) gradeI-II massage              PROM  All planes gh  X 10 all direction to tolerance X 10 all direction to tolerance All planes All planes All Planes All planes passive end stretch to tolerance All planes passive end stretch to tolerance; ER in full abductionas tolerated=full motion All planes passive end stretch to tolerance; ER in full abductionas tolerated=full motion All planes passive end stretch to tolerance; ER in full abductionas tolerated=full motion All planes passive end stretch to tolerance; ER in full abductionas tolerated=full motion All planes passive end stretch to tolerance; ER in full abductionas tolerated=full motion   Gh distraction  Grade II  3 x 20 sec with oscillation  3 x 20 sec with oscillation  Grade III Grade III with oscillation Grade III oscillation quad 1-2 Grade III oscillation quad 1-2 Grade III oscillation quad 1-3 Grade III oscillation quad 1-3 Grade III oscillation quad 1-3 Grade III oscillation quad 1-3 Grade III oscillation quad 1-3   Shoulder quadrant  Grade II   3 mins 4 mins           Rhythmic stab    Manual isometric 5\" 10x IR/ER Sub max IR/ER supine 10x each 10\" holds Sub max IR/ER supine 10x each 10\" holds           scapulothoracic        Grade IV all directions         MODALITIES: (0 minutes):        *  Cold Pack Therapy in order to provide analgesia, relieve muscle spasm and reduce inflammation and edema. .Vasoneupmatic compression performed today for same reasons as cold pack therapy    Location= R shoulder  Skin intact pre and post treatment with no adverse symptoms    Treatment/Session Assessment: Today focused on isometric muscular contractions for improving muscle activation with ER and postural resisted strengthening. No increase in pain reported with exercises. Full ROM with PROM. Continued fatigue with AROM shoulder flex. · Post-Treatment Pain/ Symptoms: No c/o's  Pain= 3/10 ·   Compliance with Program/Exercises: Compliant  · Recommendations for next session: Continue with progression of strength and ROM exercises as tolerated per protocol.      Future Appointments   Date Time Provider Jade Harley   3/7/2019 11:00 AM [facility]       Total Treatment Duration: 40 min   PT Patient Time In/Time Out  Time In: 1100  Time Out: [address]

## 2019-03-07 ENCOUNTER — HOSPITAL ENCOUNTER (OUTPATIENT)
Dept: PHYSICAL THERAPY | Age: 39
Discharge: HOME | End: 2019-03-07
Payer: OTHER MISCELLANEOUS

## 2019-03-07 PROCEDURE — 97140 MANUAL THERAPY 1/> REGIONS: CPT

## 2019-03-07 PROCEDURE — 97110 THERAPEUTIC EXERCISES: CPT

## 2019-03-07 NOTE — THERAPY RECERTIFICATION
Esperanza Barriga  : 1980      Payor: Chon Alfred / Plan: [address] / Product Type: Workers Comp /    [address] at [address] [place] [address], [address], [place], [id number] [place] [address]  [phone]   [phone]              OUTPATIENT PHYSICAL THERAPY:Daily Note, Re-evaluation and Progress Report 3/7/2019      ICD-10: Treatment Diagnosis:    Pain in right shoulder (M25.511)  Incomplete rotator cuff tear or rupture of right shoulder, not specified as traumatic (M75.111)  Impingement syndrome of right shoulder (M75.41)                Precautions/Allergies:   Patient has no known allergies. Fall Risk Score: 1 (? 5 = High Risk)  MD Orders: Eval and Treat  MEDICAL/REFERRING DIAGNOSIS:  Incomplete rotator cuff tear or rupture of right shoulder, not specified as traumatic [M75.111]  Impingement syndrome of right shoulder [M75.41]   DATE OF ONSET: 18  REFERRING PHYSICIAN: Leonel Ormond, MD  RETURN PHYSICIAN APPOINTMENT: TBD by patient      Re-evaluation/Progress Report: Esperanza Barriga is now approximately 10-11 weeks post-op R RTC repair. He has demonstrated significant increase in ROM and strength. Pt progressing well with protocol and has improved his functional mobility and returned to light-duty work. Pt will continue to benefit from skilled physical therapy for manual therapeutic techniques (as appropriate), therapeutic exercises and activities, neuromuscular re-education, and comprehensive home exercises program to continue to address ongoing current impairments and functional limitations. Re-evaluation/Progress Report: Esperanza Barriga has attended 10 physical therapy sessions including initial evaluation. He is approximately 6 weeks post operative RTC repair. He has demonstrated an increase in shoulder PROM and progressed to standing AAROM scaption per protocol.  He continues to present with decreased strength and ROM, posture and functional mobility secondary to post-op status. Pt will continue to benefit from skilled physical therapy for manual therapeutic techniques (as appropriate), therapeutic exercises and activities, neuromuscular re-education, and comprehensive home exercises program to address current impairments and functional limitations. INITIAL ASSESSMENT:   Mr. Guerrero Ayala presents to physical therapy s/p R RTC repair on 12/14/18. He presents with decreased strength, posture, ROM, joint mobility, functional mobility. These S/S are consistent with R RTC repair, R shoulder pain and impingement syndrome of R shoulder. Patient will benefit from skilled physical therapy for manual therapeutic techniques (as appropriate), therapeutic exercises and activities, neuromuscular re-education, and comprehensive home exercises program to address current impairments and functional limitations. PROBLEM LIST (Impacting functional limitations):  1. Decreased Strength  2. Decreased ADL/Functional Activities  3. Increased Pain  4. Decreased Activity Tolerance  5. Increased Shortness of Breath  6. Decreased Flexibility/Joint Mobility   Interventions Planned:   1. Cold  2. Manual therapy  3. Therapeutic exercise and activity  4. Vasopuematic compression therapy  5. E-Stim  6. Neuromuscular re-education  7. Education-HEP     TREATMENT PLAN:  Effective Dates: 12/21/18 TO 4/25/2019 (100 days). Frequency/Duration: 2 times a week for 100 Days secondary to post-operative patient  GOALS: (Goals have been discussed and agreed upon with patient.)  SHORT-TERM FUNCTIONAL GOALS: Time Frame: 4 weeks  1. Christine Smith will report <=5/10 pain with don/doffing clothing as well as minimal/no difficulty. (met 1/24/19)  2. Christine Smith will demonstrate improvement in passive shoulder flexion to >100 degrees to increase UE function and participation in ADLs. (met 1.9.19)  3.  Christine Smith will demonstrate demonstrate improvement in passive shoulder abd to >100 degrees to increase UE function and participation in ADLs. (@100 degrees 1.9.19)  4. Janette Daniel will show a greater than 8 point decrease on the DASH in order to show an increase in upper extremity function. (met 1/24/19)  5. Janette Daniel will be independent in all HEP (met 1/24/19)    DISCHARGE GOALS: Time Frame: 12 weeks    1. Janette Daniel will show full AROM of the UE in order to return to full functional mobility   2. Janette Daniel will show a greater than 15 point decrease on the DASH in order to show an increase in upper extremity function (met 3/7/19)  3. Janette Daniel will report doing hair/bathing without difficulty and <=2/10 pain in order to be independent with ADL's  4. Janette Daniel will be independent in all advanced HEP   5. Janette Daniel will be able to demonstrate 5/5 UE strength for safe return to work. Rehabilitation Potential For Stated Goals: Good  Regarding Janette Daniel therapy, I certify that the treatment plan above will be carried out by a therapist or under their direction. Thank you for this referral,  Kai Warren     Referring Physician Signature: Hillary Sosa MD          Date                      HISTORY:   History of Present Injury/Illness (Reason for Referral):  Janette Daniel presents s/p R RTC repair on 12/14/18. He states it is a work related injury when boxes fell on his shoulder. 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  Pt reports currently having most difficulty sleeping at night    -Present symptoms/complaints (on day of evaluation)  Pain Scale:  · Current: 10/10  · Best: 10/10  · Worst: 10/10    · Aggravating factors: sleeping, donning/doffing clothing, bathing   · Relieving factors: ice    Dominant Side  Right handed  Past Medical History/Comorbidities:    Johnie Prader  has a past medical history of Chlamydia, Hiatal hernia, High risk sexual behavior, and Urethritis. He also has no past medical history of Adverse effect of anesthesia, Difficult intubation, Malignant hyperthermia due to anesthesia, Nausea & vomiting, or Pseudocholinesterase deficiency. Mr. Johnie Prader  has a past surgical history that includes hx hernia repair and hx wisdom teeth extraction. Social History/Living Environment:     Lives with girlfriend   Prior Level of Function/Work/Activity:  Full time at [employer];    Current Medications:    Current Outpatient Medications:     meloxicam (MOBIC) 15 mg tablet, Take 15 mg by mouth daily. , Disp: , Rfl:     traMADol (ULTRAM) 50 mg tablet, Take 1 Tab by mouth every six (6) hours as needed for Pain.  Max Daily Amount: 200 mg., Disp: 24 Tab, Rfl: 0   - Oxycodone (per patient) prn 5mg tablet    Date Last Reviewed:  3/7/2019   EXAMINATION:   Observation/Orthostatic Postural Assessment:          Rounded shoulders  Palpation:          Minor tenderness still noted along UT and supraspinatus  Surgical sites demonstrate good healing with appropriate scar formation     ROM:    AROM/PROM         Joint: Initial : 12/26/18  Re-Assess Date:  1/9/19  Re-Assess Date:  3/7/19    ACTIVE ROM (standing) RIGHT LEFT RIGHT LEFT RIGHT LEFT   Shoulder Flexion ---    122    Shoulder Abduction ---    114    Shoulder Internal Rotation (Apley's) ---    L2    Shoulder External Rotation (Apley's)     C7    Elbow ROM WNL        PASSIVE ROM (supine)         Shoulder Flexion 85 deg  120  151    Shoulder Abduction 85 deg  100  141    Shoulder Internal Rotation 50 deg @ 30 abd  55 @ 30 abd  54 @60 abd    Shoulder External Rotation 10 deg @ 30 abd  46 @ 30 abd  60 @60 abd    Cervical flexion/extension WFL        Cervical Rotation WFL                   Strength:  Not tested secondary to post operative status x6wk per protocol pt only able to perform AAROM  Joint: Initial  Date: 12/21/18  Re-Assess Date:  Re-Assess Date: 3/7/19     RIGHT LEFT RIGHT LEFT RIGHT LEFT   Shoulder Flexion ---  --  3+/5    Shoulder Abduction  (C5) ----  --  3/5    Shoulder Internal Rotation   --  4/5    Shoulder External Rotation   --  3+/5    Elbow Flexion  (C6)     4+/5    Elbow Extension (C7)         Wrist Flexion (C7)         Wrist Extension (C6)         Resisted Thumb Extension/Finger Abduction (C8/T1)         Resisted Cervical Rotation (C1):         Resisted Shoulder Shrug (C2, 3, 4):           Strength                                      Joint Mobility Eval Date: Not test due to post op status  Re-Assess Date: 1/9/19  Re-Assess Date:     RIGHT LEFT RIGHT LEFT RIGHT LEFT   Glenohumeral   Guarded and difficult to get end-feel  Good mobility, slightly guarded    Scapulothoracic         Thoracic              Special Tests:      Neurological Screen: Assessed @ Initial Visit    Radiating symptoms? No  Functional Mobility:  Assessed @ Initial Visit ---in brace no pillow per MD   Gait and transfers safe. ADLs independent   Balance: normal       Outcome Measure: Tool Used: Disabilities of the Arm, Shoulder and Hand (DASH) Questionnaire - Quick Version  Score:  Initial: 54/55  Most Recent: 20/55 (Date: 3-7-19 )   Interpretation of Score: The DASH is designed to measure the activities of daily living in person's with upper extremity dysfunction or pain. Each section is scored on a 1-5 scale, 5 representing the greatest disability. The scores of each section are added together for a total score of 55. Score 11 12-19 20-28 29-37 38-45 46-54 55   Modifier CH CI CJ CK CL CM CN     ?  Carrying, Moving, and Handling Objects:     - CURRENT STATUS: CJ - 20%-39% impaired, limited or restricted    - GOAL STATUS: CI - 1%-19% impaired, limited or restricted    - D/C STATUS:  ---------------To be determined---------------     TREATMENT:   (In addition to Assessment/Re-Assessment sessions the following treatments were rendered)  10 weeks post-operative   · Pre-Treatment Pain/ Symptoms: Baltazar Palacios states \" doing good\"  3/10 pain prior to session. THERAPEUTIC EXERCISE: (30 minutes):  Exercises per grid below to improve mobility, strength and balance. Required minimal visual and verbal cues to promote proper body alignment, promote proper body posture and promote proper body mechanics. Progressed resistance, range and repetitions as indicated. Date:  12/21/18 Date:  12/26/18 Date  1/16/19 Date  1/21/19 Date  1/24/19 Date  1/28/19 Date  2/20/19 Date:  2/22/19 Date  2/27/19 Date  3/1/19 Date  3/5/19 Date  3/7/19   Activity/Exercise Parameters Parameters             Education Healing process, HEP, POC, PT goals, protocol postioning out of sling as aswell as sleep positions   Sleeping positions with pillows around. Stop doing band isometrics because could cause active use and too early in the healing phase to do that.     Ice vs heat for reducing inflammation     Re-assessment   Scapular retraction 5x5\"  X 10, 5 sec holds, seated   20x5\" holds 20x5\" holds     20x5\" holds red band 20x5\" holds red band 20x5\" GREEN BAND   Assisted pendulums 1 min              Elbow flexion/extension   Supine x 20              Forearm supination/pronation   X 10              Wrist flex/ext  X 10              Gripping   X 10             walkouts   10x10\" PROM 10x10\" PROM 10x10\" PROM 10x10\" PROM    10x10\" 10x10\"    UT stretch               Pulleys   6 mins 5 mins 5 mins 5 mins 5 mins 5 mins 5 mins cool down      PROM with dowel mandy   ER 10x10\" supine ER 15x10\" supine; Flexion 15x10\" holds supine;  ER 15x10\" supine; Flexion 15x10\" holds supine; AAROM          UBE    2 1/2 mins F/ 21/2 mins B for ROM 3F/3B for ROM 1.5F/1.5B for ROM 3F/3B 2F 2 B    level 8 3F/2B level 2 3F/3B level 2 3F/3B level 4 3f/3b LEVEL 2   AAROM standing     Scaption 10x5\" to tolerance Scaption 15x5\" to tolerance with scap retract/depression cues; ER 15x5\"  Standing Scaption/ER 20x with end stretch; dowel mandy *Standing Scaption/ER 20x with end stretch; dowel mandy Standing scaption/ER/IR 5x with 20\" end stretch; dowel mandy      Isometric IR/ER     Gentle against ball 10x10\" with verbal cues required Gentle against ball 10x10\" with verbal cues required  *Gentle against ball 10x10\" with verbal cues required  And flex/ext with elbow bent and straight supine for muscle activation- 8mins (10-20\" holds) Red band 10\" holds Red band 10\" holds 5x each   AROM       Seated ER as tolerated 20x with scap retract; supine elevation 20x and seated as tolerated 10x *Seated ER as tolerated 20x with scap retract; supine elevation 20x and seated as tolerated 20x Supine, 30 degree incline, and seated upright; 10x5\" stretch each flexion for endurance training. - elbows bent for 30 degree position Supine, 30 degree incline, and seated upright; 10x5\" stretch each flexion for endurance training. - elbows bent for 30 degree position Standing with visual cues 20x Shoulder flexion 20x   SA punch          20x     Shoulder ext           Red band 20x5\" Green band 20x5\"   punch           Red band 20x Green 20x   scap retract with ER           Against wall 10x10\" Against wall 10x10\"                     [facility] Portal     Manual Therapy Interventions: (10 minutes): . Joint mobilization, Soft tissue mobilization and Manipulation was utilized and necessary because of the patient's restricted joint motion, painful spasm and restricted motion of soft tissue.    Done to improve soft tissue tone and elasticity  as well as promote proper joint movement in order to improve overall movement quality  (Used abbreviations: MET - muscle energy technique; PNF - proprioceptive neuromuscular facilitation; NMR - neuromuscular re-education; AP - anterior to posterior; PA - posterior to anterior)   Patient responded well to all manual interventions listed in chart below with no significant increase in pain/symptoms. Improved ROM demonstrated following interventions and decrease in pain scale listed below.                Manual Intervention Date:  12/21/18 Date:  12/28/18 Date  1/2/19 Date  1/16/19 Date  1/21/19 Date  1/24/19 Date  1/28/19 Date  2/20/19 Date:  2/22/19 Date  2/27/19 Date  3/1/19 Date  3/7/19   Soft tissue mobilization Joint Mobility Parameters Parameters             Cervical paraspinals, UT, levator scap  Strumming/release Strumming/passive UT stretch 3 mins Strumming/pec stretch Strumming/pec stretch Strumming/pec stretch/UT release Strumming/pec stretch/UT release strumming/pec stretch/deltoid strumming strumming/pec stretch/deltoid strumming  strumming/pec stretch/deltoid strumming strumming/pec stretch/deltoid strumming strumming/pec stretch/deltoid strumming    upslides(cervical) gradeI-II massage              PROM  All planes gh  X 10 all direction to tolerance X 10 all direction to tolerance All planes All planes All Planes All planes passive end stretch to tolerance All planes passive end stretch to tolerance; ER in full abductionas tolerated=full motion All planes passive end stretch to tolerance; ER in full abductionas tolerated=full motion All planes passive end stretch to tolerance; ER in full abductionas tolerated=full motion All planes passive end stretch to tolerance; ER in full abductionas tolerated=full motion All planes passive end stretch to tolerance; ER in full abductionas tolerated=full motion   Gh distraction  Grade II  3 x 20 sec with oscillation  3 x 20 sec with oscillation  Grade III Grade III with oscillation Grade III oscillation quad 1-2 Grade III oscillation quad 1-2 Grade III oscillation quad 1-3 Grade III oscillation quad 1-3 Grade III oscillation quad 1-3 Grade III oscillation quad 1-3 Grade III oscillation quad 1-3   Shoulder quadrant  Grade II   3 mins 4 mins           Rhythmic stab    Manual isometric 5\" 10x IR/ER Sub max IR/ER supine 10x each 10\" holds Sub max IR/ER supine 10x each 10\" holds           scapulothoracic        Grade IV all directions         MODALITIES: (0 minutes):        *  Cold Pack Therapy in order to provide analgesia, relieve muscle spasm and reduce inflammation and edema. .Vasoneupmatic compression performed today for same reasons as cold pack therapy    Location= R shoulder  Skin intact pre and post treatment with no adverse symptoms    Treatment/Session Assessment: See re-eval/progress note for full details. Good response to slight increase in resistance today for postural strengthening. Pt progressing very well. · Post-Treatment Pain/ Symptoms: No c/o's  Pain= 3/10 ·   Compliance with Program/Exercises: Compliant  · Recommendations for next session: Continue with progression of strength and ROM exercises as tolerated per protocol. No future appointments.     Total Treatment Duration: 40 min   PT Patient Time In/Time Out  Time In: 1105  Time Out: Sandra Rubin 8

## 2019-03-12 ENCOUNTER — HOSPITAL ENCOUNTER (OUTPATIENT)
Dept: PHYSICAL THERAPY | Age: 39
Discharge: HOME | End: 2019-03-12
Payer: OTHER MISCELLANEOUS

## 2019-03-12 PROCEDURE — 97140 MANUAL THERAPY 1/> REGIONS: CPT

## 2019-03-12 PROCEDURE — 97110 THERAPEUTIC EXERCISES: CPT

## 2019-03-12 NOTE — PROGRESS NOTES
Sandra Hernandez  : 1980      Payor: Jeffery Oneill / Plan: [address] / Product Type: Workers Comp /    Felipe Cunningham at [address] [facility] [address], [facility], [address] [place] [address]  [phone]   [phone]              OUTPATIENT PHYSICAL THERAPY:Daily Note 3/12/2019      ICD-10: Treatment Diagnosis:    Pain in right shoulder (M25.511)  Incomplete rotator cuff tear or rupture of right shoulder, not specified as traumatic (M75.111)  Impingement syndrome of right shoulder (M75.41)                Precautions/Allergies:   Patient has no known allergies. Fall Risk Score: 1 (? 5 = High Risk)  MD Orders: Eval and Treat  MEDICAL/REFERRING DIAGNOSIS:  Incomplete rotator cuff tear or rupture of right shoulder, not specified as traumatic [M75.111]  Impingement syndrome of right shoulder [M75.41]   DATE OF ONSET: 18  REFERRING PHYSICIAN: Xiomara Lin MD  RETURN PHYSICIAN APPOINTMENT: TBD by patient      Re-evaluation/Progress Report: Sandra Hernandez is now approximately 10-11 weeks post-op R RTC repair. He has demonstrated significant increase in ROM and strength. Pt progressing well with protocol and has improved his functional mobility and returned to light-duty work. Pt will continue to benefit from skilled physical therapy for manual therapeutic techniques (as appropriate), therapeutic exercises and activities, neuromuscular re-education, and comprehensive home exercises program to continue to address ongoing current impairments and functional limitations. Re-evaluation/Progress Report: Sandra Hernandez has attended 10 physical therapy sessions including initial evaluation. He is approximately 6 weeks post operative RTC repair. He has demonstrated an increase in shoulder PROM and progressed to standing AAROM scaption per protocol.  He continues to present with decreased strength and ROM, posture and functional mobility secondary to post-op status. Pt will continue to benefit from skilled physical therapy for manual therapeutic techniques (as appropriate), therapeutic exercises and activities, neuromuscular re-education, and comprehensive home exercises program to address current impairments and functional limitations. INITIAL ASSESSMENT:   Mr. Cristal Blakely presents to physical therapy s/p R RTC repair on 12/14/18. He presents with decreased strength, posture, ROM, joint mobility, functional mobility. These S/S are consistent with R RTC repair, R shoulder pain and impingement syndrome of R shoulder. Patient will benefit from skilled physical therapy for manual therapeutic techniques (as appropriate), therapeutic exercises and activities, neuromuscular re-education, and comprehensive home exercises program to address current impairments and functional limitations. PROBLEM LIST (Impacting functional limitations):  1. Decreased Strength  2. Decreased ADL/Functional Activities  3. Increased Pain  4. Decreased Activity Tolerance  5. Increased Shortness of Breath  6. Decreased Flexibility/Joint Mobility   Interventions Planned:   1. Cold  2. Manual therapy  3. Therapeutic exercise and activity  4. Vasopuematic compression therapy  5. E-Stim  6. Neuromuscular re-education  7. Education-HEP     TREATMENT PLAN:  Effective Dates: 12/21/18 TO 4/7/2019 (90 days). Frequency/Duration: 2 times a week for >90 Days secondary to post-operative patient  GOALS: (Goals have been discussed and agreed upon with patient.)  SHORT-TERM FUNCTIONAL GOALS: Time Frame: 4 weeks  1. Anna Marie Clark will report <=5/10 pain with don/doffing clothing as well as minimal/no difficulty. (met 1/24/19)  2. Anna Marie Clark will demonstrate improvement in passive shoulder flexion to >100 degrees to increase UE function and participation in ADLs. (met 1.9.19)  3.  Anna Marie Clark will demonstrate demonstrate improvement in passive shoulder abd to >100 degrees to increase UE function and participation in ADLs. (@100 degrees 1.9.19)  4. Saintclair Hoard will show a greater than 8 point decrease on the DASH in order to show an increase in upper extremity function. (met 1/24/19)  5. Saintclair Hoard will be independent in all HEP (met 1/24/19)    DISCHARGE GOALS: Time Frame: 12 weeks    1. Saintclair Hoard will show full AROM of the UE in order to return to full functional mobility   2. Saintclair Hoard will show a greater than 15 point decrease on the DASH in order to show an increase in upper extremity function (met 3/7/19)  3. Saintclair Hoard will report doing hair/bathing without difficulty and <=2/10 pain in order to be independent with ADL's  4. Saintclair Hoard will be independent in all advanced HEP   5. Saintclair Hoard will be able to demonstrate 5/5 UE strength for safe return to work. Rehabilitation Potential For Stated Goals: Good  Regarding Saintclair Hoard therapy, I certify that the treatment plan above will be carried out by a therapist or under their direction. Thank you for this referral,  Pilo Curiel     Referring Physician Signature: Penny Davis MD          Date                      HISTORY:   History of Present Injury/Illness (Reason for Referral):  Saintclair Hoard presents s/p R RTC repair on 12/14/18. He states it is a work related injury when boxes fell on his shoulder. 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  Pt reports currently having most difficulty sleeping at night    -Present symptoms/complaints (on day of evaluation)  Pain Scale:  · Current: 10/10  · Best: 10/10  · Worst: 10/10    · Aggravating factors: sleeping, donning/doffing clothing, bathing   · Relieving factors: ice    Dominant Side  Right handed  Past Medical History/Comorbidities:   Mr. Nidhi Rossi  has a past medical history of Chlamydia, Hiatal hernia, High risk sexual behavior, and Urethritis. He also has no past medical history of Adverse effect of anesthesia, Difficult intubation, Malignant hyperthermia due to anesthesia, Nausea & vomiting, or Pseudocholinesterase deficiency. Mr. Britney Schultz  has a past surgical history that includes hx hernia repair and hx wisdom teeth extraction. Social History/Living Environment:     Lives with girlfriend   Prior Level of Function/Work/Activity:  Full time at [employer];    Current Medications:    Current Outpatient Medications:     meloxicam (MOBIC) 15 mg tablet, Take 15 mg by mouth daily. , Disp: , Rfl:     traMADol (ULTRAM) 50 mg tablet, Take 1 Tab by mouth every six (6) hours as needed for Pain.  Max Daily Amount: 200 mg., Disp: 24 Tab, Rfl: 0   - Oxycodone (per patient) prn 5mg tablet    Date Last Reviewed:  3/12/2019   EXAMINATION:   Observation/Orthostatic Postural Assessment:          Rounded shoulders  Palpation:          Minor tenderness still noted along UT and supraspinatus  Surgical sites demonstrate good healing with appropriate scar formation     ROM:    AROM/PROM         Joint: Initial : 12/26/18  Re-Assess Date:  1/9/19  Re-Assess Date:  3/7/19    ACTIVE ROM (standing) RIGHT LEFT RIGHT LEFT RIGHT LEFT   Shoulder Flexion ---    122    Shoulder Abduction ---    114    Shoulder Internal Rotation (Apley's) ---    L2    Shoulder External Rotation (Apley's)     C7    Elbow ROM WNL        PASSIVE ROM (supine)         Shoulder Flexion 85 deg  120  151    Shoulder Abduction 85 deg  100  141    Shoulder Internal Rotation 50 deg @ 30 abd  55 @ 30 abd  54 @60 abd    Shoulder External Rotation 10 deg @ 30 abd  46 @ 30 abd  60 @60 abd    Cervical flexion/extension WFL        Cervical Rotation WFL                   Strength:  Not tested secondary to post operative status x6wk per protocol pt only able to perform AAROM  Joint: Initial  Date: 12/21/18  Re-Assess Date:  Re-Assess Date: 3/7/19     RIGHT LEFT RIGHT LEFT RIGHT LEFT   Shoulder Flexion ---  --  3+/5    Shoulder Abduction  (C5) ----  --  3/5    Shoulder Internal Rotation   --  4/5    Shoulder External Rotation   --  3+/5    Elbow Flexion  (C6)     4+/5    Elbow Extension (C7)         Wrist Flexion (C7)         Wrist Extension (C6)         Resisted Thumb Extension/Finger Abduction (C8/T1)         Resisted Cervical Rotation (C1):         Resisted Shoulder Shrug (C2, 3, 4):           Strength                                      Joint Mobility Eval Date: Not test due to post op status  Re-Assess Date: 1/9/19  Re-Assess Date:     RIGHT LEFT RIGHT LEFT RIGHT LEFT   Glenohumeral   Guarded and difficult to get end-feel  Good mobility, slightly guarded    Scapulothoracic         Thoracic              Special Tests:      Neurological Screen: Assessed @ Initial Visit    Radiating symptoms? No  Functional Mobility:  Assessed @ Initial Visit ---in brace no pillow per MD   Gait and transfers safe. ADLs independent   Balance: normal       Outcome Measure: Tool Used: Disabilities of the Arm, Shoulder and Hand (DASH) Questionnaire - Quick Version  Score:  Initial: 54/55  Most Recent: 20/55 (Date: 3-7-19 )   Interpretation of Score: The DASH is designed to measure the activities of daily living in person's with upper extremity dysfunction or pain. Each section is scored on a 1-5 scale, 5 representing the greatest disability. The scores of each section are added together for a total score of 55. Score 11 12-19 20-28 29-37 38-45 46-54 55   Modifier CH CI CJ CK CL CM CN     ?  Carrying, Moving, and Handling Objects:     - CURRENT STATUS: CJ - 20%-39% impaired, limited or restricted    - GOAL STATUS: CI - 1%-19% impaired, limited or restricted    - D/C STATUS:  ---------------To be determined---------------     TREATMENT:   (In addition to Assessment/Re-Assessment sessions the following treatments were rendered)  10 weeks post-operative   · Pre-Treatment Pain/ Symptoms: Fitz Nguyen states \" doing good\" \"having trouble sleeping\"  3/10 pain prior to session. THERAPEUTIC EXERCISE: (30 minutes):  Exercises per grid below to improve mobility, strength and balance. Required minimal visual and verbal cues to promote proper body alignment, promote proper body posture and promote proper body mechanics. Progressed resistance, range and repetitions as indicated. Date:  12/21/18 Date:  12/26/18 Date  1/16/19 Date  1/21/19 Date  1/24/19 Date  1/28/19 Date  2/20/19 Date:  2/22/19 Date  2/27/19 Date  3/1/19 Date  3/5/19 Date  3/7/19 Date  3/12/19   Activity/Exercise Parameters Parameters              Education Healing process, HEP, POC, PT goals, protocol postioning out of sling as aswell as sleep positions   Sleeping positions with pillows around. Stop doing band isometrics because could cause active use and too early in the healing phase to do that.     Ice vs heat for reducing inflammation     Re-assessment    Scapular retraction 5x5\"  X 10, 5 sec holds, seated   20x5\" holds 20x5\" holds     20x5\" holds red band 20x5\" holds red band 20x5\" GREEN BAND Low rows 7# each-focus scapular depression   Assisted pendulums 1 min               Elbow flexion/extension   Supine x 20               Forearm supination/pronation   X 10               Wrist flex/ext  X 10               Gripping   X 10              walkouts   10x10\" PROM 10x10\" PROM 10x10\" PROM 10x10\" PROM    10x10\" 10x10\"     UT stretch                Pulleys   6 mins 5 mins 5 mins 5 mins 5 mins 5 mins 5 mins cool down    3 mins   PROM with dowel mandy   ER 10x10\" supine ER 15x10\" supine; Flexion 15x10\" holds supine;  ER 15x10\" supine; Flexion 15x10\" holds supine; AAROM           UBE    2 1/2 mins F/ 21/2 mins B for ROM 3F/3B for ROM 1.5F/1.5B for ROM 3F/3B 2F 2 B    level 8 3F/2B level 2 3F/3B level 2 3F/3B level 4 3f/3b LEVEL 2 3f/3b LEVEL 3   AAROM standing     Scaption 10x5\" to tolerance Scaption 15x5\" to tolerance with scap retract/depression cues; ER 15x5\"  Standing Scaption/ER 20x with end stretch; dowel mandy *Standing Scaption/ER 20x with end stretch; dowel mandy Standing scaption/ER/IR 5x with 20\" end stretch; dowel mandy       Isometric IR/ER     Gentle against ball 10x10\" with verbal cues required Gentle against ball 10x10\" with verbal cues required  *Gentle against ball 10x10\" with verbal cues required  And flex/ext with elbow bent and straight supine for muscle activation- 8mins (10-20\" holds) Red band 10\" holds Red band 10\" holds 5x each Yellow 15x3\" holds ER and 20x yellow with IR   AROM       Seated ER as tolerated 20x with scap retract; supine elevation 20x and seated as tolerated 10x *Seated ER as tolerated 20x with scap retract; supine elevation 20x and seated as tolerated 20x Supine, 30 degree incline, and seated upright; 10x5\" stretch each flexion for endurance training. - elbows bent for 30 degree position Supine, 30 degree incline, and seated upright; 10x5\" stretch each flexion for endurance training. - elbows bent for 30 degree position Standing with visual cues 20x Shoulder flexion 20x Wall slides with retraction-depression   SA punch          20x      Shoulder ext           Red band 20x5\" Green band 20x5\" Green band 20x5\"   punch           Red band 20x Green 20x Green 20x   scap retract with ER           Against wall 10x10\" Against wall 10x10\"                       [facility] Portal     Manual Therapy Interventions: (10 minutes): . Joint mobilization, Soft tissue mobilization and Manipulation was utilized and necessary because of the patient's restricted joint motion, painful spasm and restricted motion of soft tissue.    Done to improve soft tissue tone and elasticity  as well as promote proper joint movement in order to improve overall movement quality  (Used abbreviations: MET - muscle energy technique; PNF - proprioceptive neuromuscular facilitation; NMR - neuromuscular re-education; AP - anterior to posterior; PA - posterior to anterior)   Patient responded well to all manual interventions listed in chart below with no significant increase in pain/symptoms. Improved ROM demonstrated following interventions and decrease in pain scale listed below.                Manual Intervention Date:  12/21/18 Date:  12/28/18 Date  1/2/19 Date  1/16/19 Date  1/21/19 Date  1/24/19 Date  1/28/19 Date  2/20/19 Date:  2/22/19 Date  2/27/19 Date  3/1/19 Date  3/7/19 Date  3/12/19   Soft tissue mobilization Joint Mobility Parameters Parameters              Cervical paraspinals, UT, levator scap  Strumming/release Strumming/passive UT stretch 3 mins Strumming/pec stretch Strumming/pec stretch Strumming/pec stretch/UT release Strumming/pec stretch/UT release strumming/pec stretch/deltoid strumming strumming/pec stretch/deltoid strumming  strumming/pec stretch/deltoid strumming strumming/pec stretch/deltoid strumming strumming/pec stretch/deltoid strumming strumming/pec stretch/deltoid strumming    upslides(cervical) gradeI-II massage               PROM  All planes gh  X 10 all direction to tolerance X 10 all direction to tolerance All planes All planes All Planes All planes passive end stretch to tolerance All planes passive end stretch to tolerance; ER in full abductionas tolerated=full motion All planes passive end stretch to tolerance; ER in full abductionas tolerated=full motion All planes passive end stretch to tolerance; ER in full abductionas tolerated=full motion All planes passive end stretch to tolerance; ER in full abductionas tolerated=full motion All planes passive end stretch to tolerance; ER in full abductionas tolerated=full motion All planes passive end stretch to tolerance; ER in full abductionas tolerated=full motion   Gh distraction  Grade II  3 x 20 sec with oscillation  3 x 20 sec with oscillation  Grade III Grade III with oscillation Grade III oscillation quad 1-2 Grade III oscillation quad 1-2 Grade III oscillation quad 1-3 Grade III oscillation quad 1-3 Grade III oscillation quad 1-3 Grade III oscillation quad 1-3 Grade III oscillation quad 1-3    Shoulder quadrant  Grade II   3 mins 4 mins            Rhythmic stab    Manual isometric 5\" 10x IR/ER Sub max IR/ER supine 10x each 10\" holds Sub max IR/ER supine 10x each 10\" holds            scapulothoracic        Grade IV all directions          MODALITIES: (10 minutes):        *  Cold Pack Therapy in order to provide analgesia, relieve muscle spasm and reduce inflammation and edema. .Vasoneupmatic compression performed today for same reasons as cold pack therapy    Location= R shoulder  Skin intact pre and post treatment with no adverse symptoms    Treatment/Session Assessment: Minor increase in pain with overhead wall slides after about 8 reps and walkouts of ER after 7 reps due to fatigue. Pt continues to have poor communication when he feels irritation in shoulder. 152 shoulder flexion in standing today, minor end-range pain. · Post-Treatment Pain/ Symptoms: No c/o's  Pain= 3/10 ·   Compliance with Program/Exercises: Compliant  · Recommendations for next session: Continue with progression of strength and ROM exercises as tolerated per protocol.      Future Appointments   Date Time Provider Jade Harley   3/15/2019  8:45 AM Mode Spangler [facility]       Total Treatment Duration: 50 min   PT Patient Time In/Time Out  Time In: 1500  Time Out: 1550    Yvrose Melgar

## 2019-03-15 ENCOUNTER — HOSPITAL ENCOUNTER (OUTPATIENT)
Dept: PHYSICAL THERAPY | Age: 39
Discharge: HOME | End: 2019-03-15
Payer: OTHER MISCELLANEOUS

## 2019-03-15 PROCEDURE — 97110 THERAPEUTIC EXERCISES: CPT

## 2019-03-15 NOTE — PROGRESS NOTES
Augustin Aaron  : 1980      Payor: Concepcion Caballero / Plan: [address] / Product Type: Workers Comp /    [address] [facility], [facility], [address] [place] [address]  [phone]   [phone]              OUTPATIENT PHYSICAL THERAPY:Daily Note 3/15/2019      ICD-10: Treatment Diagnosis:    Pain in right shoulder (M25.511)  Incomplete rotator cuff tear or rupture of right shoulder, not specified as traumatic (M75.111)  Impingement syndrome of right shoulder (M75.41)                Precautions/Allergies:   Patient has no known allergies. Fall Risk Score: 1 (? 5 = High Risk)  MD Orders: Eval and Treat  MEDICAL/REFERRING DIAGNOSIS:  Incomplete rotator cuff tear or rupture of right shoulder, not specified as traumatic [M75.111]  Impingement syndrome of right shoulder [M75.41]   DATE OF ONSET: 18  REFERRING PHYSICIAN: Gideon Mortensen MD  RETURN PHYSICIAN APPOINTMENT: TBD by patient      Re-evaluation/Progress Report: Augustin Aaron is now approximately 10-11 weeks post-op R RTC repair. He has demonstrated significant increase in ROM and strength. Pt progressing well with protocol and has improved his functional mobility and returned to light-duty work. Pt will continue to benefit from skilled physical therapy for manual therapeutic techniques (as appropriate), therapeutic exercises and activities, neuromuscular re-education, and comprehensive home exercises program to continue to address ongoing current impairments and functional limitations. Re-evaluation/Progress Report: Augustin Aaron has attended 10 physical therapy sessions including initial evaluation. He is approximately 6 weeks post operative RTC repair. He has demonstrated an increase in shoulder PROM and progressed to standing AAROM scaption per protocol.  He continues to present with decreased strength and ROM, posture and functional mobility secondary to post-op status. Pt will continue to benefit from skilled physical therapy for manual therapeutic techniques (as appropriate), therapeutic exercises and activities, neuromuscular re-education, and comprehensive home exercises program to address current impairments and functional limitations. INITIAL ASSESSMENT:   Mr. Janene Soliz presents to physical therapy s/p R RTC repair on 12/14/18. He presents with decreased strength, posture, ROM, joint mobility, functional mobility. These S/S are consistent with R RTC repair, R shoulder pain and impingement syndrome of R shoulder. Patient will benefit from skilled physical therapy for manual therapeutic techniques (as appropriate), therapeutic exercises and activities, neuromuscular re-education, and comprehensive home exercises program to address current impairments and functional limitations. PROBLEM LIST (Impacting functional limitations):  1. Decreased Strength  2. Decreased ADL/Functional Activities  3. Increased Pain  4. Decreased Activity Tolerance  5. Increased Shortness of Breath  6. Decreased Flexibility/Joint Mobility   Interventions Planned:   1. Cold  2. Manual therapy  3. Therapeutic exercise and activity  4. Vasopuematic compression therapy  5. E-Stim  6. Neuromuscular re-education  7. Education-HEP     TREATMENT PLAN:  Effective Dates: 12/21/18 TO 4/7/2019 (90 days). Frequency/Duration: 2 times a week for >90 Days secondary to post-operative patient  GOALS: (Goals have been discussed and agreed upon with patient.)  SHORT-TERM FUNCTIONAL GOALS: Time Frame: 4 weeks  1. Oz Young will report <=5/10 pain with don/doffing clothing as well as minimal/no difficulty. (met 1/24/19)  2. Oz Young will demonstrate improvement in passive shoulder flexion to >100 degrees to increase UE function and participation in ADLs. (met 1.9.19)  3.  Kinge Young will demonstrate demonstrate improvement in passive shoulder abd to >100 degrees to increase UE function and participation in ADLs. (@100 degrees 1.9.19)  4. Roland Wing will show a greater than 8 point decrease on the DASH in order to show an increase in upper extremity function. (met 1/24/19)  5. Roland Wing will be independent in all HEP (met 1/24/19)    DISCHARGE GOALS: Time Frame: 12 weeks    1. Roland Wing will show full AROM of the UE in order to return to full functional mobility   2. Roland Wing will show a greater than 15 point decrease on the DASH in order to show an increase in upper extremity function (met 3/7/19)  3. Roland Wing will report doing hair/bathing without difficulty and <=2/10 pain in order to be independent with ADL's  4. Roland Wing will be independent in all advanced HEP   5. Roland Wing will be able to demonstrate 5/5 UE strength for safe return to work. Rehabilitation Potential For Stated Goals: Good  Regarding Roland Wing therapy, I certify that the treatment plan above will be carried out by a therapist or under their direction. Thank you for this referral,  Karen Ledesma     Referring Physician Signature: Adelso Phillips MD          Date                      HISTORY:   History of Present Injury/Illness (Reason for Referral):  Roland Wing presents s/p R RTC repair on 12/14/18. He states it is a work related injury when boxes fell on his shoulder. 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  Pt reports currently having most difficulty sleeping at night    -Present symptoms/complaints (on day of evaluation)  Pain Scale:  · Current: 10/10  · Best: 10/10  · Worst: 10/10    · Aggravating factors: sleeping, donning/doffing clothing, bathing   · Relieving factors: ice    Dominant Side  Right handed  Past Medical History/Comorbidities:   Mr. Parvin Ordoñez  has a past medical history of Chlamydia, Hiatal hernia, High risk sexual behavior, and Urethritis. He also has no past medical history of Adverse effect of anesthesia, Difficult intubation, Malignant hyperthermia due to anesthesia, Nausea & vomiting, or Pseudocholinesterase deficiency. Mr. Hu Pike  has a past surgical history that includes hx hernia repair and hx wisdom teeth extraction. Social History/Living Environment:     Lives with girlfriend   Prior Level of Function/Work/Activity:  Full time at [employer];    Current Medications:    Current Outpatient Medications:     meloxicam (MOBIC) 15 mg tablet, Take 15 mg by mouth daily. , Disp: , Rfl:     traMADol (ULTRAM) 50 mg tablet, Take 1 Tab by mouth every six (6) hours as needed for Pain.  Max Daily Amount: 200 mg., Disp: 24 Tab, Rfl: 0   - Oxycodone (per patient) prn 5mg tablet    Date Last Reviewed:  3/15/2019   EXAMINATION:   Observation/Orthostatic Postural Assessment:          Rounded shoulders  Palpation:          Minor tenderness still noted along UT and supraspinatus  Surgical sites demonstrate good healing with appropriate scar formation     ROM:    AROM/PROM         Joint: Initial : 12/26/18  Re-Assess Date:  1/9/19  Re-Assess Date:  3/7/19    ACTIVE ROM (standing) RIGHT LEFT RIGHT LEFT RIGHT LEFT   Shoulder Flexion ---    122    Shoulder Abduction ---    114    Shoulder Internal Rotation (Apley's) ---    L2    Shoulder External Rotation (Apley's)     C7    Elbow ROM WNL        PASSIVE ROM (supine)         Shoulder Flexion 85 deg  120  151    Shoulder Abduction 85 deg  100  141    Shoulder Internal Rotation 50 deg @ 30 abd  55 @ 30 abd  54 @60 abd    Shoulder External Rotation 10 deg @ 30 abd  46 @ 30 abd  60 @60 abd    Cervical flexion/extension WFL        Cervical Rotation WFL                   Strength:  Not tested secondary to post operative status x6wk per protocol pt only able to perform AAROM  Joint: Initial  Date: 12/21/18  Re-Assess Date:  Re-Assess Date: 3/7/19     RIGHT LEFT RIGHT LEFT RIGHT LEFT   Shoulder Flexion ---  --  3+/5    Shoulder Abduction  (C5) ----  --  3/5    Shoulder Internal Rotation   --  4/5    Shoulder External Rotation   --  3+/5    Elbow Flexion  (C6)     4+/5    Elbow Extension (C7)         Wrist Flexion (C7)         Wrist Extension (C6)         Resisted Thumb Extension/Finger Abduction (C8/T1)         Resisted Cervical Rotation (C1):         Resisted Shoulder Shrug (C2, 3, 4):           Strength                                      Joint Mobility Eval Date: Not test due to post op status  Re-Assess Date: 1/9/19  Re-Assess Date:     RIGHT LEFT RIGHT LEFT RIGHT LEFT   Glenohumeral   Guarded and difficult to get end-feel  Good mobility, slightly guarded    Scapulothoracic         Thoracic              Special Tests:      Neurological Screen: Assessed @ Initial Visit    Radiating symptoms? No  Functional Mobility:  Assessed @ Initial Visit ---in brace no pillow per MD   Gait and transfers safe. ADLs independent   Balance: normal       Outcome Measure: Tool Used: Disabilities of the Arm, Shoulder and Hand (DASH) Questionnaire - Quick Version  Score:  Initial: 54/55  Most Recent: 20/55 (Date: 3-7-19 )   Interpretation of Score: The DASH is designed to measure the activities of daily living in person's with upper extremity dysfunction or pain. Each section is scored on a 1-5 scale, 5 representing the greatest disability. The scores of each section are added together for a total score of 55. Score 11 12-19 20-28 29-37 38-45 46-54 55   Modifier CH CI CJ CK CL CM CN     ?  Carrying, Moving, and Handling Objects:     - CURRENT STATUS: CJ - 20%-39% impaired, limited or restricted    - GOAL STATUS: CI - 1%-19% impaired, limited or restricted    - D/C STATUS:  ---------------To be determined---------------     TREATMENT:   (In addition to Assessment/Re-Assessment sessions the following treatments were rendered)  10 weeks post-operative   · Pre-Treatment Pain/ Symptoms: Maryan Vance states \" doing good, just tired\" \"MD said 4 more weeks of therapy before determining if I can go back to work\"       THERAPEUTIC EXERCISE: (35 minutes):  Exercises per grid below to improve mobility, strength and balance. Required minimal visual and verbal cues to promote proper body alignment, promote proper body posture and promote proper body mechanics. Progressed resistance, range and repetitions as indicated. Date:  12/21/18 Date:  12/26/18 Date  1/16/19 Date  1/21/19 Date  1/24/19 Date  1/28/19 Date  2/20/19 Date:  2/22/19 Date  2/27/19 Date  3/1/19 Date  3/5/19 Date  3/7/19 Date  3/12/19 Date  3/15/19   Activity/Exercise Parameters Parameters               Education Healing process, HEP, POC, PT goals, protocol postioning out of sling as aswell as sleep positions   Sleeping positions with pillows around. Stop doing band isometrics because could cause active use and too early in the healing phase to do that.     Ice vs heat for reducing inflammation     Re-assessment     Scapular retraction 5x5\"  X 10, 5 sec holds, seated   20x5\" holds 20x5\" holds     20x5\" holds red band 20x5\" holds red band 20x5\" GREEN BAND Low rows 7# each-focus scapular depression Low rows 10# each-focus scapular depression   Assisted pendulums 1 min                Elbow flexion/extension   Supine x 20                Forearm supination/pronation   X 10                Wrist flex/ext  X 10                Gripping   X 10               walkouts   10x10\" PROM 10x10\" PROM 10x10\" PROM 10x10\" PROM    10x10\" 10x10\"      UT stretch                 Pulleys   6 mins 5 mins 5 mins 5 mins 5 mins 5 mins 5 mins cool down    3 mins 4 mins   PROM with dowel mandy   ER 10x10\" supine ER 15x10\" supine; Flexion 15x10\" holds supine;  ER 15x10\" supine; Flexion 15x10\" holds supine; AAROM            UBE    2 1/2 mins F/ 21/2 mins B for ROM 3F/3B for ROM 1.5F/1.5B for ROM 3F/3B 2F 2 B level 8 3F/2B level 2 3F/3B level 2 3F/3B level 4 3f/3b LEVEL 2 3f/3b LEVEL 3 3F/3B level 3   AAROM standing     Scaption 10x5\" to tolerance Scaption 15x5\" to tolerance with scap retract/depression cues; ER 15x5\"  Standing Scaption/ER 20x with end stretch; dowel mandy *Standing Scaption/ER 20x with end stretch; dowel mandy Standing scaption/ER/IR 5x with 20\" end stretch; dowel mandy        Isometric IR/ER     Gentle against ball 10x10\" with verbal cues required Gentle against ball 10x10\" with verbal cues required  *Gentle against ball 10x10\" with verbal cues required  And flex/ext with elbow bent and straight supine for muscle activation- 8mins (10-20\" holds) Red band 10\" holds Red band 10\" holds 5x each Yellow 15x3\" holds ER and 20x yellow with IR Yellow ER 4x30\" holds 15\" rest; Green 20x IR   AROM       Seated ER as tolerated 20x with scap retract; supine elevation 20x and seated as tolerated 10x *Seated ER as tolerated 20x with scap retract; supine elevation 20x and seated as tolerated 20x Supine, 30 degree incline, and seated upright; 10x5\" stretch each flexion for endurance training. - elbows bent for 30 degree position Supine, 30 degree incline, and seated upright; 10x5\" stretch each flexion for endurance training. - elbows bent for 30 degree position Standing with visual cues 20x Shoulder flexion 20x Wall slides with retraction-depression    SA punch          20x       Shoulder ext           Red band 20x5\" Green band 20x5\" Green band 20x5\" Green 20x5\"   punch           Red band 20x Green 20x Green 20x    scap retract with ER           Against wall 10x10\" Against wall 10x10\"      Shrugs              5# 20x   Prone I and T              20x5\" each                                       [employer]     Manual Therapy Interventions: (5 minutes): . Joint mobilization, Soft tissue mobilization and Manipulation was utilized and necessary because of the patient's restricted joint motion, painful spasm and restricted motion of soft tissue. Done to improve soft tissue tone and elasticity  as well as promote proper joint movement in order to improve overall movement quality  (Used abbreviations: MET - muscle energy technique; PNF - proprioceptive neuromuscular facilitation; NMR - neuromuscular re-education; AP - anterior to posterior; PA - posterior to anterior)   Patient responded well to all manual interventions listed in chart below with no significant increase in pain/symptoms. Improved ROM demonstrated following interventions and decrease in pain scale listed below.                Manual Intervention Date:  12/21/18 Date:  12/28/18 Date  1/2/19 Date  1/16/19 Date  1/21/19 Date  1/24/19 Date  1/28/19 Date  2/20/19 Date:  2/22/19 Date  2/27/19 Date  3/1/19 Date  3/7/19 Date  3/12/19 Date  3/15/19   Soft tissue mobilization Joint Mobility Parameters Parameters               Cervical paraspinals, UT, levator scap  Strumming/release Strumming/passive UT stretch 3 mins Strumming/pec stretch Strumming/pec stretch Strumming/pec stretch/UT release Strumming/pec stretch/UT release strumming/pec stretch/deltoid strumming strumming/pec stretch/deltoid strumming  strumming/pec stretch/deltoid strumming strumming/pec stretch/deltoid strumming strumming/pec stretch/deltoid strumming strumming/pec stretch/deltoid strumming     upslides(cervical) gradeI-II massage                PROM  All planes gh  X 10 all direction to tolerance X 10 all direction to tolerance All planes All planes All Planes All planes passive end stretch to tolerance All planes passive end stretch to tolerance; ER in full abductionas tolerated=full motion All planes passive end stretch to tolerance; ER in full abductionas tolerated=full motion All planes passive end stretch to tolerance; ER in full abductionas tolerated=full motion All planes passive end stretch to tolerance; ER in full abductionas tolerated=full motion All planes passive end stretch to tolerance; ER in full abductionas tolerated=full motion All planes passive end stretch to tolerance; ER in full abductionas tolerated=full motion All planes passive end stretch to tolerance; ER in full abductionas tolerated=full motion   Gh distraction  Grade II  3 x 20 sec with oscillation  3 x 20 sec with oscillation  Grade III Grade III with oscillation Grade III oscillation quad 1-2 Grade III oscillation quad 1-2 Grade III oscillation quad 1-3 Grade III oscillation quad 1-3 Grade III oscillation quad 1-3 Grade III oscillation quad 1-3 Grade III oscillation quad 1-3  Grade III oscillations quad 1-3   Shoulder quadrant  Grade II   3 mins 4 mins             Rhythmic stab    Manual isometric 5\" 10x IR/ER Sub max IR/ER supine 10x each 10\" holds Sub max IR/ER supine 10x each 10\" holds             scapulothoracic        Grade IV all directions           MODALITIES: (10 minutes):        *  Cold Pack Therapy in order to provide analgesia, relieve muscle spasm and reduce inflammation and edema. Location= R shoulder  Skin intact pre and post treatment with no adverse symptoms    Treatment/Session Assessment: Good response to prone scapular strengthening. Added to HEP for postural strengthening. Pt progressing well. Continue with prone scapular strengthening as able. · Post-Treatment Pain/ Symptoms: No c/o's  Pain= 3/10 ·   Compliance with Program/Exercises: Compliant  · Recommendations for next session: Continue with progression of strength and ROM exercises as tolerated per protocol.      Future Appointments   Date Time Provider Jade Harley   3/18/2019 10:15 AM Tl Montgomery [facility]   3/21/2019 10:15 AM Thai VILLALTA [facility]   3/25/2019  2:30 PM Thai VILLALTA [facility]   3/27/2019  1:45 PM Thai VILLALTA [facility]   4/1/2019  2:30 PM Thai VILLALTA [facility]   4/4/2019  2:30 PM Thai Pringle N [facility]   4/8/2019  1:45 PM Tl Arboledas [facility] 4/11/2019  2:30 PM Stanirlanda [facility]       Total Treatment Duration: 50 min   PT Patient Time In/Time Out  Time In: 0962  Time Out: 6619    [place]

## 2019-03-18 ENCOUNTER — HOSPITAL ENCOUNTER (OUTPATIENT)
Dept: PHYSICAL THERAPY | Age: 39
Discharge: HOME | End: 2019-03-18
Payer: OTHER MISCELLANEOUS

## 2019-03-18 PROCEDURE — 97110 THERAPEUTIC EXERCISES: CPT

## 2019-03-18 PROCEDURE — 97140 MANUAL THERAPY 1/> REGIONS: CPT

## 2019-03-18 NOTE — PROGRESS NOTES
Janette Daniel  : 1980      Payor: Lena Shukla / Plan: [address] / Product Type: Workers Comp /    [id number] at [address], [address] [place] [address]  [phone]   [phone]              OUTPATIENT PHYSICAL THERAPY:Daily Note 3/18/2019      ICD-10: Treatment Diagnosis:    Pain in right shoulder (M25.511)  Incomplete rotator cuff tear or rupture of right shoulder, not specified as traumatic (M75.111)  Impingement syndrome of right shoulder (M75.41)                Precautions/Allergies:   Patient has no known allergies. Fall Risk Score: 1 (? 5 = High Risk)  MD Orders: Eval and Treat  MEDICAL/REFERRING DIAGNOSIS:  Incomplete rotator cuff tear or rupture of right shoulder, not specified as traumatic [M75.111]  Impingement syndrome of right shoulder [M75.41]   DATE OF ONSET: 18  REFERRING PHYSICIAN: Hillary Sosa MD  RETURN PHYSICIAN APPOINTMENT: TBD by patient      Re-evaluation/Progress Report: Janette Daniel is now approximately 10-11 weeks post-op R RTC repair. He has demonstrated significant increase in ROM and strength. Pt progressing well with protocol and has improved his functional mobility and returned to light-duty work. Pt will continue to benefit from skilled physical therapy for manual therapeutic techniques (as appropriate), therapeutic exercises and activities, neuromuscular re-education, and comprehensive home exercises program to continue to address ongoing current impairments and functional limitations. Re-evaluation/Progress Report: Janette Daniel has attended 10 physical therapy sessions including initial evaluation. He is approximately 6 weeks post operative RTC repair. He has demonstrated an increase in shoulder PROM and progressed to standing AAROM scaption per protocol.  He continues to present with decreased strength and ROM, posture and functional mobility secondary to post-op status. Pt will continue to benefit from skilled physical therapy for manual therapeutic techniques (as appropriate), therapeutic exercises and activities, neuromuscular re-education, and comprehensive home exercises program to address current impairments and functional limitations. INITIAL ASSESSMENT:   Mr. Elli Childs presents to physical therapy s/p R RTC repair on 12/14/18. He presents with decreased strength, posture, ROM, joint mobility, functional mobility. These S/S are consistent with R RTC repair, R shoulder pain and impingement syndrome of R shoulder. Patient will benefit from skilled physical therapy for manual therapeutic techniques (as appropriate), therapeutic exercises and activities, neuromuscular re-education, and comprehensive home exercises program to address current impairments and functional limitations. PROBLEM LIST (Impacting functional limitations):  1. Decreased Strength  2. Decreased ADL/Functional Activities  3. Increased Pain  4. Decreased Activity Tolerance  5. Increased Shortness of Breath  6. Decreased Flexibility/Joint Mobility   Interventions Planned:   1. Cold  2. Manual therapy  3. Therapeutic exercise and activity  4. Vasopuematic compression therapy  5. E-Stim  6. Neuromuscular re-education  7. Education-HEP     TREATMENT PLAN:  Effective Dates: 12/21/18 TO 4/7/2019 (90 days). Frequency/Duration: 2 times a week for >90 Days secondary to post-operative patient  GOALS: (Goals have been discussed and agreed upon with patient.)  SHORT-TERM FUNCTIONAL GOALS: Time Frame: 4 weeks  1. Coon Dear will report <=5/10 pain with don/doffing clothing as well as minimal/no difficulty. (met 1/24/19)  2. Coon Dear will demonstrate improvement in passive shoulder flexion to >100 degrees to increase UE function and participation in ADLs. (met 1.9.19)  3.  Coon Dear will demonstrate demonstrate improvement in passive shoulder abd to >100 degrees to increase UE function and participation in ADLs. (@100 degrees 1.9.19)  4. Cesar Darby will show a greater than 8 point decrease on the DASH in order to show an increase in upper extremity function. (met 1/24/19)  5. Cesar Darby will be independent in all HEP (met 1/24/19)    DISCHARGE GOALS: Time Frame: 12 weeks    1. Cesar Darby will show full AROM of the UE in order to return to full functional mobility   2. Cesar Darby will show a greater than 15 point decrease on the DASH in order to show an increase in upper extremity function (met 3/7/19)  3. Cesar Darby will report doing hair/bathing without difficulty and <=2/10 pain in order to be independent with ADL's  4. Cesar Darby will be independent in all advanced HEP   5. Cesar Darby will be able to demonstrate 5/5 UE strength for safe return to work. Rehabilitation Potential For Stated Goals: Good  Regarding Cesar Darby therapy, I certify that the treatment plan above will be carried out by a therapist or under their direction. Thank you for this referral,  Arnaud Almodovar     Referring Physician Signature: Dione Quiñones MD          Date                      HISTORY:   History of Present Injury/Illness (Reason for Referral):  Cesar Darby presents s/p R RTC repair on 12/14/18. He states it is a work related injury when boxes fell on his shoulder. 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  Pt reports currently having most difficulty sleeping at night    -Present symptoms/complaints (on day of evaluation)  Pain Scale:  · Current: 10/10  · Best: 10/10  · Worst: 10/10    · Aggravating factors: sleeping, donning/doffing clothing, bathing   · Relieving factors: ice    Dominant Side  Right handed  Past Medical History/Comorbidities:   Mr. Nory Mcallister  has a past medical history of Chlamydia, Hiatal hernia, High risk sexual behavior, and Urethritis. He also has no past medical history of Adverse effect of anesthesia, Difficult intubation, Malignant hyperthermia due to anesthesia, Nausea & vomiting, or Pseudocholinesterase deficiency. Mr. Adriana Sanchez  has a past surgical history that includes hx hernia repair and hx wisdom teeth extraction. Social History/Living Environment:     Lives with girlfriend   Prior Level of Function/Work/Activity:  Full time at [employer];    Current Medications:    Current Outpatient Medications:     meloxicam (MOBIC) 15 mg tablet, Take 15 mg by mouth daily. , Disp: , Rfl:     traMADol (ULTRAM) 50 mg tablet, Take 1 Tab by mouth every six (6) hours as needed for Pain.  Max Daily Amount: 200 mg., Disp: 24 Tab, Rfl: 0   - Oxycodone (per patient) prn 5mg tablet    Date Last Reviewed:  3/18/2019   EXAMINATION:   Observation/Orthostatic Postural Assessment:          Rounded shoulders  Palpation:          Minor tenderness still noted along UT and supraspinatus  Surgical sites demonstrate good healing with appropriate scar formation     ROM:    AROM/PROM         Joint: Initial : 12/26/18  Re-Assess Date:  1/9/19  Re-Assess Date:  3/7/19    ACTIVE ROM (standing) RIGHT LEFT RIGHT LEFT RIGHT LEFT   Shoulder Flexion ---    122    Shoulder Abduction ---    114    Shoulder Internal Rotation (Apley's) ---    L2    Shoulder External Rotation (Apley's)     C7    Elbow ROM WNL        PASSIVE ROM (supine)         Shoulder Flexion 85 deg  120  151    Shoulder Abduction 85 deg  100  141    Shoulder Internal Rotation 50 deg @ 30 abd  55 @ 30 abd  54 @60 abd    Shoulder External Rotation 10 deg @ 30 abd  46 @ 30 abd  60 @60 abd    Cervical flexion/extension WFL        Cervical Rotation WFL                   Strength:  Not tested secondary to post operative status x6wk per protocol pt only able to perform AAROM  Joint: Initial  Date: 12/21/18  Re-Assess Date:  Re-Assess Date: 3/7/19     RIGHT LEFT RIGHT LEFT RIGHT LEFT   Shoulder Flexion ---  --  3+/5    Shoulder Abduction  (C5) ----  --  3/5    Shoulder Internal Rotation   --  4/5    Shoulder External Rotation   --  3+/5    Elbow Flexion  (C6)     4+/5    Elbow Extension (C7)         Wrist Flexion (C7)         Wrist Extension (C6)         Resisted Thumb Extension/Finger Abduction (C8/T1)         Resisted Cervical Rotation (C1):         Resisted Shoulder Shrug (C2, 3, 4):           Strength                                      Joint Mobility Eval Date: Not test due to post op status  Re-Assess Date: 1/9/19  Re-Assess Date:     RIGHT LEFT RIGHT LEFT RIGHT LEFT   Glenohumeral   Guarded and difficult to get end-feel  Good mobility, slightly guarded    Scapulothoracic         Thoracic              Special Tests:      Neurological Screen: Assessed @ Initial Visit    Radiating symptoms? No  Functional Mobility:  Assessed @ Initial Visit ---in brace no pillow per MD   Gait and transfers safe. ADLs independent   Balance: normal       Outcome Measure: Tool Used: Disabilities of the Arm, Shoulder and Hand (DASH) Questionnaire - Quick Version  Score:  Initial: 54/55  Most Recent: 20/55 (Date: 3-7-19 )   Interpretation of Score: The DASH is designed to measure the activities of daily living in person's with upper extremity dysfunction or pain. Each section is scored on a 1-5 scale, 5 representing the greatest disability. The scores of each section are added together for a total score of 55. Score 11 12-19 20-28 29-37 38-45 46-54 55   Modifier CH CI CJ CK CL CM CN     ?  Carrying, Moving, and Handling Objects:     - CURRENT STATUS: CJ - 20%-39% impaired, limited or restricted    - GOAL STATUS: CI - 1%-19% impaired, limited or restricted    - D/C STATUS:  ---------------To be determined---------------     TREATMENT:   (In addition to Assessment/Re-Assessment sessions the following treatments were rendered)  10 weeks post-operative   · Pre-Treatment Pain/ Symptoms: Maryan Vance states \" doing good\"       THERAPEUTIC EXERCISE: (35 minutes):  Exercises per grid below to improve mobility, strength and balance. Required minimal visual and verbal cues to promote proper body alignment, promote proper body posture and promote proper body mechanics. Progressed resistance, range and repetitions as indicated. Date:  12/21/18 Date:  12/26/18 Date  1/16/19 Date  1/21/19 Date  1/24/19 Date  1/28/19 Date  2/20/19 Date:  2/22/19 Date  2/27/19 Date  3/1/19 Date  3/5/19 Date  3/7/19 Date  3/12/19 Date  3/15/19 Date  3/18/19   Activity/Exercise Parameters Parameters                Education Healing process, HEP, POC, PT goals, protocol postioning out of sling as aswell as sleep positions   Sleeping positions with pillows around. Stop doing band isometrics because could cause active use and too early in the healing phase to do that.     Ice vs heat for reducing inflammation     Re-assessment      Scapular retraction 5x5\"  X 10, 5 sec holds, seated   20x5\" holds 20x5\" holds     20x5\" holds red band 20x5\" holds red band 20x5\" GREEN BAND Low rows 7# each-focus scapular depression Low rows 10# each-focus scapular depression Low rows 10# each-focus scapular depression   Assisted pendulums 1 min                 Elbow flexion/extension   Supine x 20                 Forearm supination/pronation   X 10                 Wrist flex/ext  X 10                 Gripping   X 10                walkouts   10x10\" PROM 10x10\" PROM 10x10\" PROM 10x10\" PROM    10x10\" 10x10\"       UT stretch                  Pulleys   6 mins 5 mins 5 mins 5 mins 5 mins 5 mins 5 mins cool down    3 mins 4 mins 3 mins   PROM with dowel mandy   ER 10x10\" supine ER 15x10\" supine; Flexion 15x10\" holds supine;  ER 15x10\" supine; Flexion 15x10\" holds supine; AAROM             UBE    2 1/2 mins F/ 21/2 mins B for ROM 3F/3B for ROM 1.5F/1.5B for ROM 3F/3B 2F 2 B    level 8 3F/2B level 2 3F/3B level 2 3F/3B level 4 3f/3b LEVEL 2 3f/3b LEVEL 3 3F/3B level 3 3F/3B level 3   AAROM standing     Scaption 10x5\" to tolerance Scaption 15x5\" to tolerance with scap retract/depression cues; ER 15x5\"  Standing Scaption/ER 20x with end stretch; dowel mandy *Standing Scaption/ER 20x with end stretch; dowel mandy Standing scaption/ER/IR 5x with 20\" end stretch; dowel mandy         Isometric IR/ER     Gentle against ball 10x10\" with verbal cues required Gentle against ball 10x10\" with verbal cues required  *Gentle against ball 10x10\" with verbal cues required  And flex/ext with elbow bent and straight supine for muscle activation- 8mins (10-20\" holds) Red band 10\" holds Red band 10\" holds 5x each Yellow 15x3\" holds ER and 20x yellow with IR Yellow ER 4x30\" holds 15\" rest; Green 20x IR    AROM       Seated ER as tolerated 20x with scap retract; supine elevation 20x and seated as tolerated 10x *Seated ER as tolerated 20x with scap retract; supine elevation 20x and seated as tolerated 20x Supine, 30 degree incline, and seated upright; 10x5\" stretch each flexion for endurance training. - elbows bent for 30 degree position Supine, 30 degree incline, and seated upright; 10x5\" stretch each flexion for endurance training. - elbows bent for 30 degree position Standing with visual cues 20x Shoulder flexion 20x Wall slides with retraction-depression  Wall slides with retraction-depression 15x5\"; wall angels too difficult due to pain at 90 degress   SA punch          20x        Shoulder ext           Red band 20x5\" Green band 20x5\" Green band 20x5\" Green 20x5\"    punch           Red band 20x Green 20x Green 20x     scap retract with ER           Against wall 10x10\" Against wall 10x10\"       Shrugs              5# 20x 5# 20x   Prone I and T              20x5\" each 15x5\" each-manual cues   D2               AROM difficult with end range pain-decreasd pain post manual and manual resistance 15x supine                       [facility]     Manual Therapy Interventions: (8 minutes): . Joint mobilization, Soft tissue mobilization and Manipulation was utilized and necessary because of the patient's restricted joint motion, painful spasm and restricted motion of soft tissue. Done to improve soft tissue tone and elasticity  as well as promote proper joint movement in order to improve overall movement quality  (Used abbreviations: MET - muscle energy technique; PNF - proprioceptive neuromuscular facilitation; NMR - neuromuscular re-education; AP - anterior to posterior; PA - posterior to anterior)   Patient responded well to all manual interventions listed in chart below with no significant increase in pain/symptoms. Improved ROM demonstrated following interventions and decrease in pain scale listed below.                Manual Intervention Date:  12/21/18 Date:  12/28/18 Date  1/2/19 Date  1/16/19 Date  1/21/19 Date  1/24/19 Date  1/28/19 Date  2/20/19 Date:  2/22/19 Date  2/27/19 Date  3/1/19 Date  3/7/19 Date  3/12/19 Date  3/15/19 Date  3/18/19   Soft tissue mobilization Joint Mobility Parameters Parameters                Cervical paraspinals, UT, levator scap  Strumming/release Strumming/passive UT stretch 3 mins Strumming/pec stretch Strumming/pec stretch Strumming/pec stretch/UT release Strumming/pec stretch/UT release strumming/pec stretch/deltoid strumming strumming/pec stretch/deltoid strumming  strumming/pec stretch/deltoid strumming strumming/pec stretch/deltoid strumming strumming/pec stretch/deltoid strumming strumming/pec stretch/deltoid strumming      upslides(cervical) gradeI-II massage                 PROM  All planes gh  X 10 all direction to tolerance X 10 all direction to tolerance All planes All planes All Planes All planes passive end stretch to tolerance All planes passive end stretch to tolerance; ER in full abductionas tolerated=full motion All planes passive end stretch to tolerance; ER in full abductionas tolerated=full motion All planes passive end stretch to tolerance; ER in full abductionas tolerated=full motion All planes passive end stretch to tolerance; ER in full abductionas tolerated=full motion All planes passive end stretch to tolerance; ER in full abductionas tolerated=full motion All planes passive end stretch to tolerance; ER in full abductionas tolerated=full motion All planes passive end stretch to tolerance; ER in full abductionas tolerated=full motion All planes passive end stretch to tolerance; ER in full abductionas tolerated=full motion   Gh distraction  Grade II  3 x 20 sec with oscillation  3 x 20 sec with oscillation  Grade III Grade III with oscillation Grade III oscillation quad 1-2 Grade III oscillation quad 1-2 Grade III oscillation quad 1-3 Grade III oscillation quad 1-3 Grade III oscillation quad 1-3 Grade III oscillation quad 1-3 Grade III oscillation quad 1-3  Grade III oscillations quad 1-3 Grade IV oscillations all quad; grade III-IV post/inf joint mobs   Shoulder quadrant  Grade II   3 mins 4 mins              Rhythmic stab    Manual isometric 5\" 10x IR/ER Sub max IR/ER supine 10x each 10\" holds Sub max IR/ER supine 10x each 10\" holds              scapulothoracic        Grade IV all directions            MODALITIES: (0 minutes):        *  Cold Pack Therapy in order to provide analgesia, relieve muscle spasm and reduce inflammation and edema. Location= R shoulder  Skin intact pre and post treatment with no adverse symptoms    Treatment/Session Assessment: Difficulty with D2 actively in standing today, that was better supine with manual resistance (light) and actively for improving functional shoulder motion and strength. Fatigue with prone scapular strengthening again today. Also more cues required for scapular retraction/depression with wall lift offs today due to fatigue.        · Post-Treatment Pain/ Symptoms: No c/o's  Pain= 3/10 ·   Compliance with Program/Exercises: Compliant  · Recommendations for next session: Continue with progression of strength and ROM exercises as tolerated per protocol.      Future Appointments   Date Time Provider Jade Harley   3/21/2019 10:15 AM [facility]   3/25/2019  2:30 PM Thai [facility]ALTA [facility]   3/27/2019  1:45 PM Thai [facility]   4/1/2019  2:30 PM Thai [facility]   4/4/2019  2:30 PM Thai [facility]   4/8/2019  1:45 PM [facility]   4/11/2019  2:30 [facility]       Total Treatment Duration: 43 min   PT Patient Time In/Time Out  Time In: 1430  Time Out: [address]

## 2019-03-25 ENCOUNTER — HOSPITAL ENCOUNTER (OUTPATIENT)
Dept: PHYSICAL THERAPY | Age: 39
Discharge: HOME | End: 2019-03-25
Payer: OTHER MISCELLANEOUS

## 2019-03-25 PROCEDURE — 97110 THERAPEUTIC EXERCISES: CPT

## 2019-03-25 NOTE — PROGRESS NOTES
Anne-Marie Spencer  : 1980      Payor: Dakota Zepeda / Plan: [address] / Product Type: Workers Comp /    [address] [facility] [address] at [address], [address], [facility], [address] [place] [address]  [phone]   [phone]              OUTPATIENT PHYSICAL THERAPY:Daily Note 3/25/2019      ICD-10: Treatment Diagnosis:    Pain in right shoulder (M25.511)  Incomplete rotator cuff tear or rupture of right shoulder, not specified as traumatic (M75.111)  Impingement syndrome of right shoulder (M75.41)                Precautions/Allergies:   Patient has no known allergies. Fall Risk Score: 1 (? 5 = High Risk)  MD Orders: Eval and Treat  MEDICAL/REFERRING DIAGNOSIS:  Incomplete rotator cuff tear or rupture of right shoulder, not specified as traumatic [M75.111]  Impingement syndrome of right shoulder [M75.41]   DATE OF ONSET: 18  REFERRING PHYSICIAN: Ra Chapman MD  RETURN PHYSICIAN APPOINTMENT: TBD by patient      Re-evaluation/Progress Report: Anne-Marie Spencer is now approximately 10-11 weeks post-op R RTC repair. He has demonstrated significant increase in ROM and strength. Pt progressing well with protocol and has improved his functional mobility and returned to light-duty work. Pt will continue to benefit from skilled physical therapy for manual therapeutic techniques (as appropriate), therapeutic exercises and activities, neuromuscular re-education, and comprehensive home exercises program to continue to address ongoing current impairments and functional limitations. Re-evaluation/Progress Report: Anne-Marie Spencer has attended 10 physical therapy sessions including initial evaluation. He is approximately 6 weeks post operative RTC repair. He has demonstrated an increase in shoulder PROM and progressed to standing AAROM scaption per protocol.  He continues to present with decreased strength and ROM, posture and functional mobility secondary to post-op status. Pt will continue to benefit from skilled physical therapy for manual therapeutic techniques (as appropriate), therapeutic exercises and activities, neuromuscular re-education, and comprehensive home exercises program to address current impairments and functional limitations. INITIAL ASSESSMENT:   Mr. Nidhi Rossi presents to physical therapy s/p R RTC repair on 12/14/18. He presents with decreased strength, posture, ROM, joint mobility, functional mobility. These S/S are consistent with R RTC repair, R shoulder pain and impingement syndrome of R shoulder. Patient will benefit from skilled physical therapy for manual therapeutic techniques (as appropriate), therapeutic exercises and activities, neuromuscular re-education, and comprehensive home exercises program to address current impairments and functional limitations. PROBLEM LIST (Impacting functional limitations):  1. Decreased Strength  2. Decreased ADL/Functional Activities  3. Increased Pain  4. Decreased Activity Tolerance  5. Increased Shortness of Breath  6. Decreased Flexibility/Joint Mobility   Interventions Planned:   1. Cold  2. Manual therapy  3. Therapeutic exercise and activity  4. Vasopuematic compression therapy  5. E-Stim  6. Neuromuscular re-education  7. Education-HEP     TREATMENT PLAN:  Effective Dates: 12/21/18 TO 4/7/2019 (90 days). Frequency/Duration: 2 times a week for >90 Days secondary to post-operative patient  GOALS: (Goals have been discussed and agreed upon with patient.)  SHORT-TERM FUNCTIONAL GOALS: Time Frame: 4 weeks  1. Saintclair Hoard will report <=5/10 pain with don/doffing clothing as well as minimal/no difficulty. (met 1/24/19)  2. Saintclair Hoard will demonstrate improvement in passive shoulder flexion to >100 degrees to increase UE function and participation in ADLs. (met 1.9.19)  3.  Saintclair Hoard will demonstrate demonstrate improvement in passive shoulder abd to >100 degrees to increase UE function and participation in ADLs. (@100 degrees 1.9.19)  4. Augustin Specter will show a greater than 8 point decrease on the DASH in order to show an increase in upper extremity function. (met 1/24/19)  5. Augustin Olivaser will be independent in all HEP (met 1/24/19)    DISCHARGE GOALS: Time Frame: 12 weeks    1. Augustin Olivaser will show full AROM of the UE in order to return to full functional mobility   2. Augustin Specter will show a greater than 15 point decrease on the DASH in order to show an increase in upper extremity function (met 3/7/19)  3. Augustin Olivaser will report doing hair/bathing without difficulty and <=2/10 pain in order to be independent with ADL's  4. Augustin Specter will be independent in all advanced HEP   5. Augustin Specter will be able to demonstrate 5/5 UE strength for safe return to work. Rehabilitation Potential For Stated Goals: Good  Regarding Augustin Aaron therapy, I certify that the treatment plan above will be carried out by a therapist or under their direction. Thank you for this referral,  Graciela Kaur     Referring Physician Signature: Gideon Mortensen MD          Date                      HISTORY:   History of Present Injury/Illness (Reason for Referral):  Augustin Aaron presents s/p R RTC repair on 12/14/18. He states it is a work related injury when boxes fell on his shoulder. 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  Pt reports currently having most difficulty sleeping at night    -Present symptoms/complaints (on day of evaluation)  Pain Scale:  · Current: 10/10  · Best: 10/10  · Worst: 10/10    · Aggravating factors: sleeping, donning/doffing clothing, bathing   · Relieving factors: ice    Dominant Side  Right handed  Past Medical History/Comorbidities:   Mr. Hu Pike  has a past medical history of Chlamydia, Hiatal hernia, High risk sexual behavior, and Urethritis. He also has no past medical history of Adverse effect of anesthesia, Difficult intubation, Malignant hyperthermia due to anesthesia, Nausea & vomiting, or Pseudocholinesterase deficiency. Mr. Parvin Ordoñez  has a past surgical history that includes hx hernia repair and hx wisdom teeth extraction. Social History/Living Environment:     Lives with girlfriend   Prior Level of Function/Work/Activity:  Full time at [employer];    Current Medications:    Current Outpatient Medications:     meloxicam (MOBIC) 15 mg tablet, Take 15 mg by mouth daily. , Disp: , Rfl:     traMADol (ULTRAM) 50 mg tablet, Take 1 Tab by mouth every six (6) hours as needed for Pain.  Max Daily Amount: 200 mg., Disp: 24 Tab, Rfl: 0   - Oxycodone (per patient) prn 5mg tablet    Date Last Reviewed:  3/25/2019   EXAMINATION:   Observation/Orthostatic Postural Assessment:          Rounded shoulders  Palpation:          Minor tenderness still noted along UT and supraspinatus  Surgical sites demonstrate good healing with appropriate scar formation     ROM:    AROM/PROM         Joint: Initial : 12/26/18  Re-Assess Date:  1/9/19  Re-Assess Date:  3/7/19    ACTIVE ROM (standing) RIGHT LEFT RIGHT LEFT RIGHT LEFT   Shoulder Flexion ---    122    Shoulder Abduction ---    114    Shoulder Internal Rotation (Apley's) ---    L2    Shoulder External Rotation (Apley's)     C7    Elbow ROM WNL        PASSIVE ROM (supine)         Shoulder Flexion 85 deg  120  151    Shoulder Abduction 85 deg  100  141    Shoulder Internal Rotation 50 deg @ 30 abd  55 @ 30 abd  54 @60 abd    Shoulder External Rotation 10 deg @ 30 abd  46 @ 30 abd  60 @60 abd    Cervical flexion/extension WFL        Cervical Rotation WFL                   Strength:  Not tested secondary to post operative status x6wk per protocol pt only able to perform AAROM  Joint: Initial  Date: 12/21/18  Re-Assess Date:  Re-Assess Date: 3/7/19     RIGHT LEFT RIGHT LEFT RIGHT LEFT   Shoulder Flexion ---  --  3+/5    Shoulder Abduction  (C5) ----  --  3/5    Shoulder Internal Rotation   --  4/5    Shoulder External Rotation   --  3+/5    Elbow Flexion  (C6)     4+/5    Elbow Extension (C7)         Wrist Flexion (C7)         Wrist Extension (C6)         Resisted Thumb Extension/Finger Abduction (C8/T1)         Resisted Cervical Rotation (C1):         Resisted Shoulder Shrug (C2, 3, 4):           Strength                                      Joint Mobility Eval Date: Not test due to post op status  Re-Assess Date: 1/9/19  Re-Assess Date:     RIGHT LEFT RIGHT LEFT RIGHT LEFT   Glenohumeral   Guarded and difficult to get end-feel  Good mobility, slightly guarded    Scapulothoracic         Thoracic              Special Tests:      Neurological Screen: Assessed @ Initial Visit    Radiating symptoms? No  Functional Mobility:  Assessed @ Initial Visit ---in brace no pillow per MD   Gait and transfers safe. ADLs independent   Balance: normal       Outcome Measure: Tool Used: Disabilities of the Arm, Shoulder and Hand (DASH) Questionnaire - Quick Version  Score:  Initial: 54/55  Most Recent: 20/55 (Date: 3-7-19 )   Interpretation of Score: The DASH is designed to measure the activities of daily living in person's with upper extremity dysfunction or pain. Each section is scored on a 1-5 scale, 5 representing the greatest disability. The scores of each section are added together for a total score of 55. Score 11 12-19 20-28 29-37 38-45 46-54 55   Modifier CH CI CJ CK CL CM CN     ?  Carrying, Moving, and Handling Objects:     - CURRENT STATUS: CJ - 20%-39% impaired, limited or restricted    - GOAL STATUS: CI - 1%-19% impaired, limited or restricted    - D/C STATUS:  ---------------To be determined---------------     TREATMENT:   (In addition to Assessment/Re-Assessment sessions the following treatments were rendered)  14 weeks post-operative   · Pre-Treatment Pain/ Symptoms: Andrade Valadezphilomena states \" doing great. Painted this weekend\"       THERAPEUTIC EXERCISE: (45 minutes):  Exercises per grid below to improve mobility, strength and balance. Required minimal visual and verbal cues to promote proper body alignment, promote proper body posture and promote proper body mechanics. Progressed resistance, range and repetitions as indicated. Date:  12/21/18 Date:  12/26/18 Date  1/16/19 Date  1/21/19 Date  2/20/19 Date:  2/22/19 Date  2/27/19 Date  3/1/19 Date  3/5/19 Date  3/7/19 Date  3/12/19 Date  3/15/19 Date  3/18/19 Date  3/25/19   Activity/Exercise Parameters Parameters               Education Healing process, HEP, POC, PT goals, protocol postioning out of sling as aswell as sleep positions   Sleeping positions with pillows around. Stop doing band isometrics because could cause active use and too early in the healing phase to do that.   Ice vs heat for reducing inflammation     Re-assessment       Scapular retraction 5x5\"  X 10, 5 sec holds, seated   20x5\" holds    20x5\" holds red band 20x5\" holds red band 20x5\" GREEN BAND Low rows 7# each-focus scapular depression Low rows 10# each-focus scapular depression Low rows 10# each-focus scapular depression Low rows 13# each-focus scapular depression   Assisted pendulums 1 min                Elbow flexion/extension   Supine x 20                Forearm supination/pronation   X 10                Wrist flex/ext  X 10                Gripping   X 10               walkouts   10x10\" PROM 10x10\" PROM    10x10\" 10x10\"     10x 10\" holds for plank progression   UT stretch                 Pulleys   6 mins 5 mins 5 mins 5 mins 5 mins cool down    3 mins 4 mins 3 mins    PROM with dowel mandy   ER 10x10\" supine ER 15x10\" supine; Flexion 15x10\" holds supine;              UBE    2 1/2 mins F/ 21/2 mins B for ROM 3F/3B 2F 2 B    level 8 3F/2B level 2 3F/3B level 2 3F/3B level 4 3f/3b LEVEL 2 3f/3b LEVEL 3 3F/3B level 3 3F/3B level 3 3F/3B level 4   AAROM standing     Standing Scaption/ER 20x with end stretch; dowel mandy *Standing Scaption/ER 20x with end stretch; dowel mandy Standing scaption/ER/IR 5x with 20\" end stretch; dowel mandy          Isometric IR/ER      *Gentle against ball 10x10\" with verbal cues required  And flex/ext with elbow bent and straight supine for muscle activation- 8mins (10-20\" holds) Red band 10\" holds Red band 10\" holds 5x each Yellow 15x3\" holds ER and 20x yellow with IR Yellow ER 4x30\" holds 15\" rest; Green 20x IR  10x10\" yellow ER/Red IR   AROM     Seated ER as tolerated 20x with scap retract; supine elevation 20x and seated as tolerated 10x *Seated ER as tolerated 20x with scap retract; supine elevation 20x and seated as tolerated 20x Supine, 30 degree incline, and seated upright; 10x5\" stretch each flexion for endurance training. - elbows bent for 30 degree position Supine, 30 degree incline, and seated upright; 10x5\" stretch each flexion for endurance training. - elbows bent for 30 degree position Standing with visual cues 20x Shoulder flexion 20x Wall slides with retraction-depression  Wall slides with retraction-depression 15x5\"; wall angels too difficult due to pain at 90 degress    SA punch        20x         Shoulder ext         Red band 20x5\" Green band 20x5\" Green band 20x5\" Green 20x5\"  Green 2x10   punch         Red band 20x Green 20x Green 20x   Green 20x   scap retract with ER         Against wall 10x10\" Against wall 10x10\"        Shrugs            5# 20x 5# 20x 20x 8#   Prone I and T            20x5\" each 15x5\" each-manual cues    D2             AROM difficult with end range pain-decreasd pain post manual and manual resistance 15x supine Yellow band 20x-improved form today no pain   Body blade              4x20\"    Throwing               Yellow band 15x (IR @90degrees)     [employer]     Manual Therapy Interventions: (0 minutes): . Joint mobilization, Soft tissue mobilization and Manipulation was utilized and necessary because of the patient's restricted joint motion, painful spasm and restricted motion of soft tissue. Done to improve soft tissue tone and elasticity  as well as promote proper joint movement in order to improve overall movement quality  (Used abbreviations: MET - muscle energy technique; PNF - proprioceptive neuromuscular facilitation; NMR - neuromuscular re-education; AP - anterior to posterior; PA - posterior to anterior)   Patient responded well to all manual interventions listed in chart below with no significant increase in pain/symptoms. Improved ROM demonstrated following interventions and decrease in pain scale listed below.                Manual Intervention Date:  12/21/18 Date:  12/28/18 Date  1/2/19 Date  1/16/19 Date  1/21/19 Date  1/24/19 Date  1/28/19 Date  2/20/19 Date:  2/22/19 Date  2/27/19 Date  3/1/19 Date  3/7/19 Date  3/12/19 Date  3/15/19 Date  3/18/19   Soft tissue mobilization Joint Mobility Parameters Parameters                Cervical paraspinals, UT, levator scap  Strumming/release Strumming/passive UT stretch 3 mins Strumming/pec stretch Strumming/pec stretch Strumming/pec stretch/UT release Strumming/pec stretch/UT release strumming/pec stretch/deltoid strumming strumming/pec stretch/deltoid strumming  strumming/pec stretch/deltoid strumming strumming/pec stretch/deltoid strumming strumming/pec stretch/deltoid strumming strumming/pec stretch/deltoid strumming      upslides(cervical) gradeI-II massage                 PROM  All planes gh  X 10 all direction to tolerance X 10 all direction to tolerance All planes All planes All Planes All planes passive end stretch to tolerance All planes passive end stretch to tolerance; ER in full abductionas tolerated=full motion All planes passive end stretch to tolerance; ER in full abductionas tolerated=full motion All planes passive end stretch to tolerance; ER in full abductionas tolerated=full motion All planes passive end stretch to tolerance; ER in full abductionas tolerated=full motion All planes passive end stretch to tolerance; ER in full abductionas tolerated=full motion All planes passive end stretch to tolerance; ER in full abductionas tolerated=full motion All planes passive end stretch to tolerance; ER in full abductionas tolerated=full motion All planes passive end stretch to tolerance; ER in full abductionas tolerated=full motion   Gh distraction  Grade II  3 x 20 sec with oscillation  3 x 20 sec with oscillation  Grade III Grade III with oscillation Grade III oscillation quad 1-2 Grade III oscillation quad 1-2 Grade III oscillation quad 1-3 Grade III oscillation quad 1-3 Grade III oscillation quad 1-3 Grade III oscillation quad 1-3 Grade III oscillation quad 1-3  Grade III oscillations quad 1-3 Grade IV oscillations all quad; grade III-IV post/inf joint mobs   Shoulder quadrant  Grade II   3 mins 4 mins              Rhythmic stab    Manual isometric 5\" 10x IR/ER Sub max IR/ER supine 10x each 10\" holds Sub max IR/ER supine 10x each 10\" holds              scapulothoracic        Grade IV all directions            MODALITIES: (0 minutes):        *  Cold Pack Therapy in order to provide analgesia, relieve muscle spasm and reduce inflammation and edema. Location= R shoulder  Skin intact pre and post treatment with no adverse symptoms    Treatment/Session Assessment: significant progress demonstrated with beginning overhead strengthening and WB. Pt fatigued after 20\" of body blade but no significant pain and able to complete 4 reps. · Post-Treatment Pain/ Symptoms: No c/o's  Pain= 3/10 ·   Compliance with Program/Exercises: Compliant  · Recommendations for next session: Continue with progression of strength and ROM exercises as tolerated per protocol.      Future Appointments   Date Time Provider Jade Harley   3/27/2019  1:45 PM [facility] 4/1/2019  2:30 PM Derrick Red N [facility]   4/4/2019  2:30 PM [facility] Red [facility]   4/8/2019  1:45 [facility]   4/11/2019  2[facility]30 [facility]       Total Treatment Duration: 45 min   PT Patient Time In/Time Out  Time In: 1430  Time Out: 1518    Aram Mercedes

## 2019-03-27 ENCOUNTER — HOSPITAL ENCOUNTER (OUTPATIENT)
Dept: PHYSICAL THERAPY | Age: 39
Discharge: HOME | End: 2019-03-27
Payer: OTHER MISCELLANEOUS

## 2019-03-27 PROCEDURE — 97140 MANUAL THERAPY 1/> REGIONS: CPT

## 2019-03-27 PROCEDURE — 97110 THERAPEUTIC EXERCISES: CPT

## 2019-03-27 NOTE — PROGRESS NOTES
Maryan Vance  : 1980      Payor: Raul Rowan / Plan: [address] / Product Type: Workers Comp /    [address] at [address], [address], [place], [address] [place] [address]  [phone]   [phone]              OUTPATIENT PHYSICAL THERAPY:Daily Note 3/27/2019      ICD-10: Treatment Diagnosis:    Pain in right shoulder (M25.511)  Incomplete rotator cuff tear or rupture of right shoulder, not specified as traumatic (M75.111)  Impingement syndrome of right shoulder (M75.41)                Precautions/Allergies:   Patient has no known allergies. Fall Risk Score: 1 (? 5 = High Risk)  MD Orders: Eval and Treat  MEDICAL/REFERRING DIAGNOSIS:  Incomplete rotator cuff tear or rupture of right shoulder, not specified as traumatic [M75.111]  Impingement syndrome of right shoulder [M75.41]   DATE OF ONSET: 18  REFERRING PHYSICIAN: Malcolm Payne MD  RETURN PHYSICIAN APPOINTMENT: TBD by patient      Re-evaluation/Progress Report: Maryan Vance is now approximately 10-11 weeks post-op R RTC repair. He has demonstrated significant increase in ROM and strength. Pt progressing well with protocol and has improved his functional mobility and returned to light-duty work. Pt will continue to benefit from skilled physical therapy for manual therapeutic techniques (as appropriate), therapeutic exercises and activities, neuromuscular re-education, and comprehensive home exercises program to continue to address ongoing current impairments and functional limitations. Re-evaluation/Progress Report: Maryan Vance has attended 10 physical therapy sessions including initial evaluation. He is approximately 6 weeks post operative RTC repair. He has demonstrated an increase in shoulder PROM and progressed to standing AAROM scaption per protocol.  He continues to present with decreased strength and ROM, posture and functional mobility secondary to post-op status. Pt will continue to benefit from skilled physical therapy for manual therapeutic techniques (as appropriate), therapeutic exercises and activities, neuromuscular re-education, and comprehensive home exercises program to address current impairments and functional limitations. INITIAL ASSESSMENT:   Mr. Britney Schultz presents to physical therapy s/p R RTC repair on 12/14/18. He presents with decreased strength, posture, ROM, joint mobility, functional mobility. These S/S are consistent with R RTC repair, R shoulder pain and impingement syndrome of R shoulder. Patient will benefit from skilled physical therapy for manual therapeutic techniques (as appropriate), therapeutic exercises and activities, neuromuscular re-education, and comprehensive home exercises program to address current impairments and functional limitations. PROBLEM LIST (Impacting functional limitations):  1. Decreased Strength  2. Decreased ADL/Functional Activities  3. Increased Pain  4. Decreased Activity Tolerance  5. Increased Shortness of Breath  6. Decreased Flexibility/Joint Mobility   Interventions Planned:   1. Cold  2. Manual therapy  3. Therapeutic exercise and activity  4. Vasopuematic compression therapy  5. E-Stim  6. Neuromuscular re-education  7. Education-HEP     TREATMENT PLAN:  Effective Dates: 12/21/18 TO 4/7/2019 (90 days). Frequency/Duration: 2 times a week for >90 Days secondary to post-operative patient  GOALS: (Goals have been discussed and agreed upon with patient.)  SHORT-TERM FUNCTIONAL GOALS: Time Frame: 4 weeks  1. Joanie Mederos will report <=5/10 pain with don/doffing clothing as well as minimal/no difficulty. (met 1/24/19)  2. Joanie Mederos will demonstrate improvement in passive shoulder flexion to >100 degrees to increase UE function and participation in ADLs. (met 1.9.19)  3.  Joanie Mederos will demonstrate demonstrate improvement in passive shoulder abd to >100 degrees to increase UE function and participation in ADLs. (@100 degrees 1.9.19)  4. Pretty Sunshine will show a greater than 8 point decrease on the DASH in order to show an increase in upper extremity function. (met 1/24/19)  5. Pretty Sunshine will be independent in all HEP (met 1/24/19)    DISCHARGE GOALS: Time Frame: 12 weeks    1. Pretty Sunshine will show full AROM of the UE in order to return to full functional mobility   2. Pretty Sunshine will show a greater than 15 point decrease on the DASH in order to show an increase in upper extremity function (met 3/7/19)  3. Pretty Sunshine will report doing hair/bathing without difficulty and <=2/10 pain in order to be independent with ADL's  4. Pretty Sunshine will be independent in all advanced HEP   5. Pretty Sunshine will be able to demonstrate 5/5 UE strength for safe return to work. Rehabilitation Potential For Stated Goals: Good  Regarding Pretty Sunshine therapy, I certify that the treatment plan above will be carried out by a therapist or under their direction. Thank you for this referral,  Arianne Ibarra     Referring Physician Signature: Rao Savage MD          Date                      HISTORY:   History of Present Injury/Illness (Reason for Referral):  Pretty Sunshine presents s/p R RTC repair on 12/14/18. He states it is a work related injury when boxes fell on his shoulder. 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  Pt reports currently having most difficulty sleeping at night    -Present symptoms/complaints (on day of evaluation)  Pain Scale:  · Current: 10/10  · Best: 10/10  · Worst: 10/10    · Aggravating factors: sleeping, donning/doffing clothing, bathing   · Relieving factors: ice    Dominant Side  Right handed  Past Medical History/Comorbidities:   Mr. Alvarado Arevalo  has a past medical history of Chlamydia, Hiatal hernia, High risk sexual behavior, and Urethritis. He also has no past medical history of Adverse effect of anesthesia, Difficult intubation, Malignant hyperthermia due to anesthesia, Nausea & vomiting, or Pseudocholinesterase deficiency. Mr. Wesley Appiah  has a past surgical history that includes hx hernia repair and hx wisdom teeth extraction. Social History/Living Environment:     Lives with girlfriend   Prior Level of Function/Work/Activity:  Full time at [employer];    Current Medications:    Current Outpatient Medications:     meloxicam (MOBIC) 15 mg tablet, Take 15 mg by mouth daily. , Disp: , Rfl:     traMADol (ULTRAM) 50 mg tablet, Take 1 Tab by mouth every six (6) hours as needed for Pain.  Max Daily Amount: 200 mg., Disp: 24 Tab, Rfl: 0   - Oxycodone (per patient) prn 5mg tablet    Date Last Reviewed:  3/27/2019   EXAMINATION:   Observation/Orthostatic Postural Assessment:          Rounded shoulders  Palpation:          Minor tenderness still noted along UT and supraspinatus  Surgical sites demonstrate good healing with appropriate scar formation     ROM:    AROM/PROM         Joint: Initial : 12/26/18  Re-Assess Date:  1/9/19  Re-Assess Date:  3/7/19    ACTIVE ROM (standing) RIGHT LEFT RIGHT LEFT RIGHT LEFT   Shoulder Flexion ---    122    Shoulder Abduction ---    114    Shoulder Internal Rotation (Apley's) ---    L2    Shoulder External Rotation (Apley's)     C7    Elbow ROM WNL        PASSIVE ROM (supine)         Shoulder Flexion 85 deg  120  151    Shoulder Abduction 85 deg  100  141    Shoulder Internal Rotation 50 deg @ 30 abd  55 @ 30 abd  54 @60 abd    Shoulder External Rotation 10 deg @ 30 abd  46 @ 30 abd  60 @60 abd    Cervical flexion/extension WFL        Cervical Rotation WFL                   Strength:  Not tested secondary to post operative status x6wk per protocol pt only able to perform AAROM  Joint: Initial  Date: 12/21/18  Re-Assess Date:  Re-Assess Date: 3/7/19     RIGHT LEFT RIGHT LEFT RIGHT LEFT   Shoulder Flexion ---  --  3+/5    Shoulder Abduction  (C5) ----  --  3/5    Shoulder Internal Rotation   --  4/5    Shoulder External Rotation   --  3+/5    Elbow Flexion  (C6)     4+/5    Elbow Extension (C7)         Wrist Flexion (C7)         Wrist Extension (C6)         Resisted Thumb Extension/Finger Abduction (C8/T1)         Resisted Cervical Rotation (C1):         Resisted Shoulder Shrug (C2, 3, 4):           Strength                                      Joint Mobility Eval Date: Not test due to post op status  Re-Assess Date: 1/9/19  Re-Assess Date:     RIGHT LEFT RIGHT LEFT RIGHT LEFT   Glenohumeral   Guarded and difficult to get end-feel  Good mobility, slightly guarded    Scapulothoracic         Thoracic              Special Tests:      Neurological Screen: Assessed @ Initial Visit    Radiating symptoms? No  Functional Mobility:  Assessed @ Initial Visit ---in brace no pillow per MD   Gait and transfers safe. ADLs independent   Balance: normal       Outcome Measure: Tool Used: Disabilities of the Arm, Shoulder and Hand (DASH) Questionnaire - Quick Version  Score:  Initial: 54/55  Most Recent: 20/55 (Date: 3-7-19 )   Interpretation of Score: The DASH is designed to measure the activities of daily living in person's with upper extremity dysfunction or pain. Each section is scored on a 1-5 scale, 5 representing the greatest disability. The scores of each section are added together for a total score of 55. Score 11 12-19 20-28 29-37 38-45 46-54 55   Modifier CH CI CJ CK CL CM CN     ?  Carrying, Moving, and Handling Objects:     - CURRENT STATUS: CJ - 20%-39% impaired, limited or restricted    - GOAL STATUS: CI - 1%-19% impaired, limited or restricted    - D/C STATUS:  ---------------To be determined---------------     TREATMENT:   (In addition to Assessment/Re-Assessment sessions the following treatments were rendered)  14 weeks post-operative   · Pre-Treatment Pain/ Symptoms: Wilberto Narrow states \"a little sore after last session, but muscle soreness\"       THERAPEUTIC EXERCISE: (30 minutes):  Exercises per grid below to improve mobility, strength and balance. Required minimal visual and verbal cues to promote proper body alignment, promote proper body posture and promote proper body mechanics. Progressed resistance, range and repetitions as indicated. Date:  12/21/18 Date:  12/26/18 Date  1/16/19 Date  1/21/19 Date  2/20/19 Date:  2/22/19 Date  2/27/19 Date  3/1/19 Date  3/5/19 Date  3/7/19 Date  3/12/19 Date  3/15/19 Date  3/18/19 Date  3/25/19 Date  3/27/19   Activity/Exercise Parameters Parameters                Education Healing process, HEP, POC, PT goals, protocol postioning out of sling as aswell as sleep positions   Sleeping positions with pillows around. Stop doing band isometrics because could cause active use and too early in the healing phase to do that.   Ice vs heat for reducing inflammation     Re-assessment        Scapular retraction 5x5\"  X 10, 5 sec holds, seated   20x5\" holds    20x5\" holds red band 20x5\" holds red band 20x5\" GREEN BAND Low rows 7# each-focus scapular depression Low rows 10# each-focus scapular depression Low rows 10# each-focus scapular depression Low rows 13# each-focus scapular depression Green 20x5\"   Assisted pendulums 1 min                 Elbow flexion/extension   Supine x 20                 Forearm supination/pronation   X 10                 Wrist flex/ext  X 10                 Gripping   X 10                walkouts   10x10\" PROM 10x10\" PROM    10x10\" 10x10\"     10x 10\" holds for plank progression 10x 10\" holds for plank progression   UT stretch                  Pulleys   6 mins 5 mins 5 mins 5 mins 5 mins cool down    3 mins 4 mins 3 mins     Horizontal abd/add               10x yellow small ROM at 90 degrees abd   UBE    2 1/2 mins F/ 21/2 mins B for ROM 3F/3B 2F 2 B    level 8 3F/2B level 2 3F/3B level 2 3F/3B level 4 3f/3b LEVEL 2 3f/3b LEVEL 3 3F/3B level 3 3F/3B level 3 3F/3B level 4 3F/3B level 4   AAROM standing     Standing Scaption/ER 20x with end stretch; dowel mandy *Standing Scaption/ER 20x with end stretch; dowel mandy Standing scaption/ER/IR 5x with 20\" end stretch; dowel mandy           Isometric IR/ER      *Gentle against ball 10x10\" with verbal cues required  And flex/ext with elbow bent and straight supine for muscle activation- 8mins (10-20\" holds) Red band 10\" holds Red band 10\" holds 5x each Yellow 15x3\" holds ER and 20x yellow with IR Yellow ER 4x30\" holds 15\" rest; Green 20x IR  10x10\" yellow ER/Red IR    AROM     Seated ER as tolerated 20x with scap retract; supine elevation 20x and seated as tolerated 10x *Seated ER as tolerated 20x with scap retract; supine elevation 20x and seated as tolerated 20x Supine, 30 degree incline, and seated upright; 10x5\" stretch each flexion for endurance training. - elbows bent for 30 degree position Supine, 30 degree incline, and seated upright; 10x5\" stretch each flexion for endurance training. - elbows bent for 30 degree position Standing with visual cues 20x Shoulder flexion 20x Wall slides with retraction-depression  Wall slides with retraction-depression 15x5\"; wall angels too difficult due to pain at 90 degress  Flexion with 1# weight 30x-cues for depression and retraction of scapula   SA punch        20x          Shoulder ext         Red band 20x5\" Green band 20x5\" Green band 20x5\" Green 20x5\"  Green 2x10 Green 20x5\"   punch         Red band 20x Green 20x Green 20x   Green 20x Green 20x   scap retract with ER         Against wall 10x10\" Against wall 10x10\"         Shrugs            5# 20x 5# 20x 20x 8#    Prone I and T            20x5\" each 15x5\" each-manual cues     D2             AROM difficult with end range pain-decreasd pain post manual and manual resistance 15x supine Yellow band 20x-improved form today no pain    Body blade 4x20\"  2 mins   Throwing               Yellow band 15x (IR @90degrees) Yellow band 15x (IR @90degrees) and 1# ball against wall for small ROM IR/ER at 90 degrees abd     [employer] Portal     Manual Therapy Interventions: (10 minutes): . Joint mobilization, Soft tissue mobilization and Manipulation was utilized and necessary because of the patient's restricted joint motion, painful spasm and restricted motion of soft tissue. Done to improve soft tissue tone and elasticity  as well as promote proper joint movement in order to improve overall movement quality  (Used abbreviations: MET - muscle energy technique; PNF - proprioceptive neuromuscular facilitation; NMR - neuromuscular re-education; AP - anterior to posterior; PA - posterior to anterior)   Patient responded well to all manual interventions listed in chart below with no significant increase in pain/symptoms. Improved ROM demonstrated following interventions and decrease in pain scale listed below.                Manual Intervention Date:  12/21/18 Date:  12/28/18 Date  1/2/19 Date  1/16/19 Date  1/21/19 Date  1/24/19 Date  1/28/19 Date  2/20/19 Date:  2/22/19 Date  2/27/19 Date  3/1/19 Date  3/7/19 Date  3/12/19 Date  3/15/19 Date  3/27/19   Soft tissue mobilization Joint Mobility Parameters Parameters                Cervical paraspinals, UT, levator scap  Strumming/release Strumming/passive UT stretch 3 mins Strumming/pec stretch Strumming/pec stretch Strumming/pec stretch/UT release Strumming/pec stretch/UT release strumming/pec stretch/deltoid strumming strumming/pec stretch/deltoid strumming  strumming/pec stretch/deltoid strumming strumming/pec stretch/deltoid strumming strumming/pec stretch/deltoid strumming strumming/pec stretch/deltoid strumming      upslides(cervical) gradeI-II massage                 PROM  All planes gh  X 10 all direction to tolerance X 10 all direction to tolerance All planes All planes All Planes All planes passive end stretch to tolerance All planes passive end stretch to tolerance; ER in full abductionas tolerated=full motion All planes passive end stretch to tolerance; ER in full abductionas tolerated=full motion All planes passive end stretch to tolerance; ER in full abductionas tolerated=full motion All planes passive end stretch to tolerance; ER in full abductionas tolerated=full motion All planes passive end stretch to tolerance; ER in full abductionas tolerated=full motion All planes passive end stretch to tolerance; ER in full abductionas tolerated=full motion All planes passive end stretch to tolerance; ER in full abductionas tolerated=full motion All planes passive end stretch to tolerance; ER in full abductionas tolerated=full motion   Gh distraction  Grade II  3 x 20 sec with oscillation  3 x 20 sec with oscillation  Grade III Grade III with oscillation Grade III oscillation quad 1-2 Grade III oscillation quad 1-2 Grade III oscillation quad 1-3 Grade III oscillation quad 1-3 Grade III oscillation quad 1-3 Grade III oscillation quad 1-3 Grade III oscillation quad 1-3  Grade III oscillations quad 1-3 Grade IV oscillations all quad; grade III-IV post/inf joint mobs   Shoulder quadrant  Grade II   3 mins 4 mins              Rhythmic stab    Manual isometric 5\" 10x IR/ER Sub max IR/ER supine 10x each 10\" holds Sub max IR/ER supine 10x each 10\" holds              scapulothoracic        Grade IV all directions            MODALITIES: (10 minutes):        *  Cold Pack Therapy in order to provide analgesia, relieve muscle spasm and reduce inflammation and edema. Location= R shoulder  Skin intact pre and post treatment with no adverse symptoms    Treatment/Session Assessment: Appropriate fatigue with exercises today. No increase in pain. Pt progressing very well to overhead strengthening and starting throwing strengthening. Minor manual cues continued to be required for scapular depression/retraction.        · Post-Treatment Pain/ Symptoms: No c/o's  Pain= 0/10 ·   Compliance with Program/Exercises: Compliant  · Recommendations for next session: Continue with progression of strength and ROM exercises as tolerated per protocol.      Future Appointments   Date Time Provider Jade Harley   4/3/2019  3:15 PM Mariana Quiñones [facility]   4/4/2019  2:30 PM Mal VILLALTA [facility]   4/8/2019  1:45 PM Mal VILLALTA [facility]   4/11/2019  2:30 PM Mariana Quiñones [facility]       Total Treatment Duration: 50 min   PT Patient Time In/Time Out  Time In: 1887  Time Out: [address]

## 2019-04-03 ENCOUNTER — HOSPITAL ENCOUNTER (OUTPATIENT)
Dept: PHYSICAL THERAPY | Age: 39
Discharge: HOME | End: 2019-04-03
Payer: OTHER MISCELLANEOUS

## 2019-04-03 PROCEDURE — 97110 THERAPEUTIC EXERCISES: CPT

## 2019-04-03 NOTE — PROGRESS NOTES
Giorgi Lopez  : 1980      Payor: Venkata Aldo / Plan: [address] / Product Type: Workers Comp /    [address] at [address] [facility], [address], [facility] [[email or web address]], [address] [place] [address]  [phone]   [phone]              OUTPATIENT PHYSICAL THERAPY:Daily Note 4/3/2019      ICD-10: Treatment Diagnosis:    Pain in right shoulder (M25.511)  Incomplete rotator cuff tear or rupture of right shoulder, not specified as traumatic (M75.111)  Impingement syndrome of right shoulder (M75.41)                Precautions/Allergies:   Patient has no known allergies. Fall Risk Score: 1 (? 5 = High Risk)  MD Orders: Eval and Treat  MEDICAL/REFERRING DIAGNOSIS:  Incomplete rotator cuff tear or rupture of right shoulder, not specified as traumatic [M75.111]  Impingement syndrome of right shoulder [M75.41]   DATE OF ONSET: 18  REFERRING PHYSICIAN: Autumn Dhaliwal MD  RETURN PHYSICIAN APPOINTMENT: TBD by patient      Re-evaluation/Progress Report: Giorgi Lopez is now approximately 10-11 weeks post-op R RTC repair. He has demonstrated significant increase in ROM and strength. Pt progressing well with protocol and has improved his functional mobility and returned to light-duty work. Pt will continue to benefit from skilled physical therapy for manual therapeutic techniques (as appropriate), therapeutic exercises and activities, neuromuscular re-education, and comprehensive home exercises program to continue to address ongoing current impairments and functional limitations. Re-evaluation/Progress Report: Giorgi Lopez has attended 10 physical therapy sessions including initial evaluation. He is approximately 6 weeks post operative RTC repair. He has demonstrated an increase in shoulder PROM and progressed to standing AAROM scaption per protocol.  He continues to present with decreased strength and ROM, posture and functional mobility secondary to post-op status. Pt will continue to benefit from skilled physical therapy for manual therapeutic techniques (as appropriate), therapeutic exercises and activities, neuromuscular re-education, and comprehensive home exercises program to address current impairments and functional limitations. INITIAL ASSESSMENT:   Mr. Niya Weber presents to physical therapy s/p R RTC repair on 12/14/18. He presents with decreased strength, posture, ROM, joint mobility, functional mobility. These S/S are consistent with R RTC repair, R shoulder pain and impingement syndrome of R shoulder. Patient will benefit from skilled physical therapy for manual therapeutic techniques (as appropriate), therapeutic exercises and activities, neuromuscular re-education, and comprehensive home exercises program to address current impairments and functional limitations. PROBLEM LIST (Impacting functional limitations):  1. Decreased Strength  2. Decreased ADL/Functional Activities  3. Increased Pain  4. Decreased Activity Tolerance  5. Increased Shortness of Breath  6. Decreased Flexibility/Joint Mobility   Interventions Planned:   1. Cold  2. Manual therapy  3. Therapeutic exercise and activity  4. Vasopuematic compression therapy  5. E-Stim  6. Neuromuscular re-education  7. Education-HEP     TREATMENT PLAN:  Effective Dates: 12/21/18 TO 4/7/2019 (90 days). Frequency/Duration: 2 times a week for >90 Days secondary to post-operative patient  GOALS: (Goals have been discussed and agreed upon with patient.)  SHORT-TERM FUNCTIONAL GOALS: Time Frame: 4 weeks  1. Mireya Agent will report <=5/10 pain with don/doffing clothing as well as minimal/no difficulty. (met 1/24/19)  2. Mireya Agent will demonstrate improvement in passive shoulder flexion to >100 degrees to increase UE function and participation in ADLs. (met 1.9.19)  3.  Mireya Agent will demonstrate demonstrate improvement in passive shoulder abd to >100 degrees to increase UE function and participation in ADLs. (@100 degrees 1.9.19)  4. Emigdio Bledsoe will show a greater than 8 point decrease on the DASH in order to show an increase in upper extremity function. (met 1/24/19)  5. Emigdio Bledsoe will be independent in all HEP (met 1/24/19)    DISCHARGE GOALS: Time Frame: 12 weeks    1. Emigdio Bledsoe will show full AROM of the UE in order to return to full functional mobility   2. Emigdio Bledsoe will show a greater than 15 point decrease on the DASH in order to show an increase in upper extremity function (met 3/7/19)  3. Emigdio Bledsoe will report doing hair/bathing without difficulty and <=2/10 pain in order to be independent with ADL's  4. Emigdio Bledsoe will be independent in all advanced HEP   5. Emigdio Bledsoe will be able to demonstrate 5/5 UE strength for safe return to work. Rehabilitation Potential For Stated Goals: Good  Regarding Emigdio Bledsoe therapy, I certify that the treatment plan above will be carried out by a therapist or under their direction. Thank you for this referral,  Bassem Chan     Referring Physician Signature: Jerson Harris MD          Date                      HISTORY:   History of Present Injury/Illness (Reason for Referral):  Emigdio Bledsoe presents s/p R RTC repair on 12/14/18. He states it is a work related injury when boxes fell on his shoulder. 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  Pt reports currently having most difficulty sleeping at night    -Present symptoms/complaints (on day of evaluation)  Pain Scale:  · Current: 10/10  · Best: 10/10  · Worst: 10/10    · Aggravating factors: sleeping, donning/doffing clothing, bathing   · Relieving factors: ice    Dominant Side  Right handed  Past Medical History/Comorbidities:   Mr. Beth Bello  has a past medical history of Chlamydia, Hiatal hernia, High risk sexual behavior, and Urethritis. He also has no past medical history of Adverse effect of anesthesia, Difficult intubation, Malignant hyperthermia due to anesthesia, Nausea & vomiting, or Pseudocholinesterase deficiency. Mr. Ruba Ibarra  has a past surgical history that includes hx hernia repair and hx wisdom teeth extraction. Social History/Living Environment:     Lives with girlfriend   Prior Level of Function/Work/Activity:  Full time at [employer];    Current Medications:    Current Outpatient Medications:     meloxicam (MOBIC) 15 mg tablet, Take 15 mg by mouth daily. , Disp: , Rfl:     traMADol (ULTRAM) 50 mg tablet, Take 1 Tab by mouth every six (6) hours as needed for Pain.  Max Daily Amount: 200 mg., Disp: 24 Tab, Rfl: 0   - Oxycodone (per patient) prn 5mg tablet    Date Last Reviewed:  4/3/2019   EXAMINATION:   Observation/Orthostatic Postural Assessment:          Rounded shoulders  Palpation:          Minor tenderness still noted along UT and supraspinatus  Surgical sites demonstrate good healing with appropriate scar formation     ROM:    AROM/PROM         Joint: Initial : 12/26/18  Re-Assess Date:  1/9/19  Re-Assess Date:  3/7/19    ACTIVE ROM (standing) RIGHT LEFT RIGHT LEFT RIGHT LEFT   Shoulder Flexion ---    122    Shoulder Abduction ---    114    Shoulder Internal Rotation (Apley's) ---    L2    Shoulder External Rotation (Apley's)     C7    Elbow ROM WNL        PASSIVE ROM (supine)         Shoulder Flexion 85 deg  120  151    Shoulder Abduction 85 deg  100  141    Shoulder Internal Rotation 50 deg @ 30 abd  55 @ 30 abd  54 @60 abd    Shoulder External Rotation 10 deg @ 30 abd  46 @ 30 abd  60 @60 abd    Cervical flexion/extension WFL        Cervical Rotation WFL                   Strength:  Not tested secondary to post operative status x6wk per protocol pt only able to perform AAROM  Joint: Initial  Date: 12/21/18  Re-Assess Date:  Re-Assess Date: 3/7/19     RIGHT LEFT RIGHT LEFT RIGHT LEFT   Shoulder Flexion ---  --  3+/5    Shoulder Abduction  (C5) ----  --  3/5    Shoulder Internal Rotation   --  4/5    Shoulder External Rotation   --  3+/5    Elbow Flexion  (C6)     4+/5    Elbow Extension (C7)         Wrist Flexion (C7)         Wrist Extension (C6)         Resisted Thumb Extension/Finger Abduction (C8/T1)         Resisted Cervical Rotation (C1):         Resisted Shoulder Shrug (C2, 3, 4):           Strength                                      Joint Mobility Eval Date: Not test due to post op status  Re-Assess Date: 1/9/19  Re-Assess Date:     RIGHT LEFT RIGHT LEFT RIGHT LEFT   Glenohumeral   Guarded and difficult to get end-feel  Good mobility, slightly guarded    Scapulothoracic         Thoracic              Special Tests:      Neurological Screen: Assessed @ Initial Visit    Radiating symptoms? No  Functional Mobility:  Assessed @ Initial Visit ---in brace no pillow per MD   Gait and transfers safe. ADLs independent   Balance: normal       Outcome Measure: Tool Used: Disabilities of the Arm, Shoulder and Hand (DASH) Questionnaire - Quick Version  Score:  Initial: 54/55  Most Recent: 20/55 (Date: 3-7-19 )   Interpretation of Score: The DASH is designed to measure the activities of daily living in person's with upper extremity dysfunction or pain. Each section is scored on a 1-5 scale, 5 representing the greatest disability. The scores of each section are added together for a total score of 55. Score 11 12-19 20-28 29-37 38-45 46-54 55   Modifier CH CI CJ CK CL CM CN     ?  Carrying, Moving, and Handling Objects:     - CURRENT STATUS: CJ - 20%-39% impaired, limited or restricted    - GOAL STATUS: CI - 1%-19% impaired, limited or restricted    - D/C STATUS:  ---------------To be determined---------------     TREATMENT:   (In addition to Assessment/Re-Assessment sessions the following treatments were rendered)  15 weeks post-operative   · Pre-Treatment Pain/ Symptoms: Malik Ordoñez states \"doing good\"       THERAPEUTIC EXERCISE: (40 minutes):  Exercises per grid below to improve mobility, strength and balance. Required minimal visual and verbal cues to promote proper body alignment, promote proper body posture and promote proper body mechanics. Progressed resistance, range and repetitions as indicated.      Date:  12/21/18 Date:  12/26/18 Date  1/16/19 Date  2/27/19 Date  3/1/19 Date  3/5/19 Date  3/7/19 Date  3/12/19 Date  3/15/19 Date  3/18/19 Date  3/25/19 Date  3/27/19 Date  4/3/19   Activity/Exercise Parameters Parameters              Education Healing process, HEP, POC, PT goals, protocol postioning out of sling as aswell as sleep positions      Re-assessment         Scapular retraction 5x5\"  X 10, 5 sec holds, seated    20x5\" holds red band 20x5\" holds red band 20x5\" GREEN BAND Low rows 7# each-focus scapular depression Low rows 10# each-focus scapular depression Low rows 10# each-focus scapular depression Low rows 13# each-focus scapular depression Green 20x5\" Blue 30x   Assisted pendulums 1 min               Elbow flexion/extension   Supine x 20               Forearm supination/pronation   X 10               Wrist flex/ext  X 10               Gripping   X 10              walkouts   10x10\" PROM  10x10\" 10x10\"     10x 10\" holds for plank progression 10x 10\" holds for plank progression 10x 10\" holds for plank progression   UT stretch                Pulleys   6 mins 5 mins cool down    3 mins 4 mins 3 mins   3 mins   Horizontal abd/add            10x yellow small ROM at 90 degrees abd 15x red small ROM at 90 degrees abd   UBE    3F/2B level 2 3F/3B level 2 3F/3B level 4 3f/3b LEVEL 2 3f/3b LEVEL 3 3F/3B level 3 3F/3B level 3 3F/3B level 4 3F/3B level 4 3F/3B level 5   AAROM standing    Standing scaption/ER/IR 5x with 20\" end stretch; dowel mandy            Isometric IR/ER     And flex/ext with elbow bent and straight supine for muscle activation- 8mins (10-20\" holds) Red band 10\" holds Red band 10\" holds 5x each Yellow 15x3\" holds ER and 20x yellow with IR Yellow ER 4x30\" holds 15\" rest; Green 20x IR  10x10\" yellow ER/Red IR  Red [id number]\" holds ER and IR   AROM    Supine, 30 degree incline, and seated upright; 10x5\" stretch each flexion for endurance training. - elbows bent for 30 degree position Supine, 30 degree incline, and seated upright; 10x5\" stretch each flexion for endurance training. - elbows bent for 30 degree position Standing with visual cues 20x Shoulder flexion 20x Wall slides with retraction-depression  Wall slides with retraction-depression 15x5\"; wall angels too difficult due to pain at 90 degress  Flexion with 1# weight 30x-cues for depression and retraction of scapula Flexion with 1# weight 30x-cues for depression and retraction of scapula   SA punch     20x           Shoulder ext      Red band 20x5\" Green band 20x5\" Green band 20x5\" Green 20x5\"  Green 2x10 Green 20x5\" Blue 30x5\"   punch      Red band 20x Green 20x Green 20x   Green 20x Green 20x    scap retract with ER      Against wall 10x10\" Against wall 10x10\"          Shrugs         5# 20x 5# 20x 20x 8#     Prone I and T         20x5\" each 15x5\" each-manual cues      D2          AROM difficult with end range pain-decreasd pain post manual and manual resistance 15x supine Yellow band 20x-improved form today no pain     Body blade           4x20\"  2 mins 4x20\"   Throwing            Yellow band 15x (IR @90degrees) Yellow band 15x (IR @90degrees) and 1# ball against wall for small ROM IR/ER at 90 degrees abd Red band 15x (IR @90degrees) and 1# ball against wall for small ROM IR/ER at 90 degrees abd 30x     [employer] Portal     Manual Therapy Interventions: (0 minutes): . Joint mobilization, Soft tissue mobilization and Manipulation was utilized and necessary because of the patient's restricted joint motion, painful spasm and restricted motion of soft tissue. Done to improve soft tissue tone and elasticity  as well as promote proper joint movement in order to improve overall movement quality  (Used abbreviations: MET - muscle energy technique; PNF - proprioceptive neuromuscular facilitation; NMR - neuromuscular re-education; AP - anterior to posterior; PA - posterior to anterior)   Patient responded well to all manual interventions listed in chart below with no significant increase in pain/symptoms. Improved ROM demonstrated following interventions and decrease in pain scale listed below.                Manual Intervention Date:  12/21/18 Date:  12/28/18 Date  1/2/19 Date  1/16/19 Date  1/21/19 Date  1/24/19 Date  1/28/19 Date  2/20/19 Date:  2/22/19 Date  2/27/19 Date  3/1/19 Date  3/7/19 Date  3/12/19 Date  3/15/19 Date  3/27/19   Soft tissue mobilization Joint Mobility Parameters Parameters                Cervical paraspinals, UT, levator scap  Strumming/release Strumming/passive UT stretch 3 mins Strumming/pec stretch Strumming/pec stretch Strumming/pec stretch/UT release Strumming/pec stretch/UT release strumming/pec stretch/deltoid strumming strumming/pec stretch/deltoid strumming  strumming/pec stretch/deltoid strumming strumming/pec stretch/deltoid strumming strumming/pec stretch/deltoid strumming strumming/pec stretch/deltoid strumming      upslides(cervical) gradeI-II massage                 PROM  All planes gh  X 10 all direction to tolerance X 10 all direction to tolerance All planes All planes All Planes All planes passive end stretch to tolerance All planes passive end stretch to tolerance; ER in full abductionas tolerated=full motion All planes passive end stretch to tolerance; ER in full abductionas tolerated=full motion All planes passive end stretch to tolerance; ER in full abductionas tolerated=full motion All planes passive end stretch to tolerance; ER in full abductionas tolerated=full motion All planes passive end stretch to tolerance; ER in full abductionas tolerated=full motion All planes passive end stretch to tolerance; ER in full abductionas tolerated=full motion All planes passive end stretch to tolerance; ER in full abductionas tolerated=full motion All planes passive end stretch to tolerance; ER in full abductionas tolerated=full motion   Gh distraction  Grade II  3 x 20 sec with oscillation  3 x 20 sec with oscillation  Grade III Grade III with oscillation Grade III oscillation quad 1-2 Grade III oscillation quad 1-2 Grade III oscillation quad 1-3 Grade III oscillation quad 1-3 Grade III oscillation quad 1-3 Grade III oscillation quad 1-3 Grade III oscillation quad 1-3  Grade III oscillations quad 1-3 Grade IV oscillations all quad; grade III-IV post/inf joint mobs   Shoulder quadrant  Grade II   3 mins 4 mins              Rhythmic stab    Manual isometric 5\" 10x IR/ER Sub max IR/ER supine 10x each 10\" holds Sub max IR/ER supine 10x each 10\" holds              scapulothoracic        Grade IV all directions            MODALITIES: (10 minutes):        *  Cold Pack Therapy in order to provide analgesia, relieve muscle spasm and reduce inflammation and edema. Location= R shoulder  Skin intact pre and post treatment with no adverse symptoms    Treatment/Session Assessment: Appropriate fatigue with exercises today. No increase in pain. He is progressing very well with overhead strengthening in multiple directions. Progress WB exercises as tolerated for stability strengthening. · Post-Treatment Pain/ Symptoms: No c/o's  Pain= 0/10 ·   Compliance with Program/Exercises: Compliant  · Recommendations for next session: Continue with progression of strength and ROM exercises as tolerated per protocol.      Future Appointments   Date Time Provider Jade Harley   4/4/2019  2:30 [facility]   4/8/2019  1:45 PM Kristel VILLALTA [facility]   4/11/2019  2:30 PM [facility]       Total Treatment Duration: 40 min   PT Patient Time In/Time Out  Time In: 8906  Time Out: 9267    Amanuel Stoddard

## 2019-04-04 ENCOUNTER — HOSPITAL ENCOUNTER (OUTPATIENT)
Dept: PHYSICAL THERAPY | Age: 39
Discharge: HOME | End: 2019-04-04
Payer: OTHER MISCELLANEOUS

## 2019-04-04 PROCEDURE — 97110 THERAPEUTIC EXERCISES: CPT

## 2019-04-04 NOTE — PROGRESS NOTES
Lindsay Corea  : 1980      Payor: Peggyann Boast / Plan: [address] / Product Type: Workers Comp /    [facility] at [address], [address], [facility], [address] [place] [address]  [phone]   [phone]              OUTPATIENT PHYSICAL THERAPY:Daily Note 2019      ICD-10: Treatment Diagnosis:    Pain in right shoulder (M25.511)  Incomplete rotator cuff tear or rupture of right shoulder, not specified as traumatic (M75.111)  Impingement syndrome of right shoulder (M75.41)                Precautions/Allergies:   Patient has no known allergies. Fall Risk Score: 1 (? 5 = High Risk)  MD Orders: Eval and Treat  MEDICAL/REFERRING DIAGNOSIS:  Incomplete rotator cuff tear or rupture of right shoulder, not specified as traumatic [M75.111]  Impingement syndrome of right shoulder [M75.41]   DATE OF ONSET: 18  REFERRING PHYSICIAN: Petra Murphy MD  RETURN PHYSICIAN APPOINTMENT: TBD by patient      Re-evaluation/Progress Report: Lindsay Corea is now approximately 10-11 weeks post-op R RTC repair. He has demonstrated significant increase in ROM and strength. Pt progressing well with protocol and has improved his functional mobility and returned to light-duty work. Pt will continue to benefit from skilled physical therapy for manual therapeutic techniques (as appropriate), therapeutic exercises and activities, neuromuscular re-education, and comprehensive home exercises program to continue to address ongoing current impairments and functional limitations. Re-evaluation/Progress Report: Lindsay Corea has attended 10 physical therapy sessions including initial evaluation. He is approximately 6 weeks post operative RTC repair. He has demonstrated an increase in shoulder PROM and progressed to standing AAROM scaption per protocol.  He continues to present with decreased strength and ROM, posture and functional mobility secondary to post-op status. Pt will continue to benefit from skilled physical therapy for manual therapeutic techniques (as appropriate), therapeutic exercises and activities, neuromuscular re-education, and comprehensive home exercises program to address current impairments and functional limitations. INITIAL ASSESSMENT:   Mr. Noble Morgan presents to physical therapy s/p R RTC repair on 12/14/18. He presents with decreased strength, posture, ROM, joint mobility, functional mobility. These S/S are consistent with R RTC repair, R shoulder pain and impingement syndrome of R shoulder. Patient will benefit from skilled physical therapy for manual therapeutic techniques (as appropriate), therapeutic exercises and activities, neuromuscular re-education, and comprehensive home exercises program to address current impairments and functional limitations. PROBLEM LIST (Impacting functional limitations):  1. Decreased Strength  2. Decreased ADL/Functional Activities  3. Increased Pain  4. Decreased Activity Tolerance  5. Increased Shortness of Breath  6. Decreased Flexibility/Joint Mobility   Interventions Planned:   1. Cold  2. Manual therapy  3. Therapeutic exercise and activity  4. Vasopuematic compression therapy  5. E-Stim  6. Neuromuscular re-education  7. Education-HEP     TREATMENT PLAN:  Effective Dates: 12/21/18 TO 4/7/2019 (90 days). Frequency/Duration: 2 times a week for >90 Days secondary to post-operative patient  GOALS: (Goals have been discussed and agreed upon with patient.)  SHORT-TERM FUNCTIONAL GOALS: Time Frame: 4 weeks  1. Norma Lias will report <=5/10 pain with don/doffing clothing as well as minimal/no difficulty. (met 1/24/19)  2. Norma Lias will demonstrate improvement in passive shoulder flexion to >100 degrees to increase UE function and participation in ADLs. (met 1.9.19)  3.  Norma Lias will demonstrate demonstrate improvement in passive shoulder abd to >100 degrees to increase UE function and participation in ADLs. (@100 degrees 1.9.19)  4. Emigdio Bledsoe will show a greater than 8 point decrease on the DASH in order to show an increase in upper extremity function. (met 1/24/19)  5. Emigdio Bledsoe will be independent in all HEP (met 1/24/19)    DISCHARGE GOALS: Time Frame: 12 weeks    1. Emigdio Bledsoe will show full AROM of the UE in order to return to full functional mobility   2. Emigdio Bledsoe will show a greater than 15 point decrease on the DASH in order to show an increase in upper extremity function (met 3/7/19)  3. Emigdio Bledsoe will report doing hair/bathing without difficulty and <=2/10 pain in order to be independent with ADL's  4. Emigdio Bledsoe will be independent in all advanced HEP   5. Emigdio Bledsoe will be able to demonstrate 5/5 UE strength for safe return to work. Rehabilitation Potential For Stated Goals: Good  Regarding Emigdio Bledsoe therapy, I certify that the treatment plan above will be carried out by a therapist or under their direction. Thank you for this referral,  Bassem Chan     Referring Physician Signature: Nima Garcia MD          Date                      HISTORY:   History of Present Injury/Illness (Reason for Referral):  Emigdio Bledsoe presents s/p R RTC repair on 12/14/18. He states it is a work related injury when boxes fell on his shoulder. 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  Pt reports currently having most difficulty sleeping at night    -Present symptoms/complaints (on day of evaluation)  Pain Scale:  · Current: 10/10  · Best: 10/10  · Worst: 10/10    · Aggravating factors: sleeping, donning/doffing clothing, bathing   · Relieving factors: ice    Dominant Side  Right handed  Past Medical History/Comorbidities:   Mr. Beth Bello  has a past medical history of Chlamydia, Hiatal hernia, High risk sexual behavior, and Urethritis. He also has no past medical history of Adverse effect of anesthesia, Difficult intubation, Malignant hyperthermia due to anesthesia, Nausea & vomiting, or Pseudocholinesterase deficiency. Mr. Delta Jaquez  has a past surgical history that includes hx hernia repair and hx wisdom teeth extraction. Social History/Living Environment:     Lives with girlfriend   Prior Level of Function/Work/Activity:  Full time at [employer];    Current Medications:    Current Outpatient Medications:     meloxicam (MOBIC) 15 mg tablet, Take 15 mg by mouth daily. , Disp: , Rfl:     traMADol (ULTRAM) 50 mg tablet, Take 1 Tab by mouth every six (6) hours as needed for Pain.  Max Daily Amount: 200 mg., Disp: 24 Tab, Rfl: 0   - Oxycodone (per patient) prn 5mg tablet    Date Last Reviewed:  4/4/2019   EXAMINATION:   Observation/Orthostatic Postural Assessment:          Rounded shoulders  Palpation:          Minor tenderness still noted along UT and supraspinatus  Surgical sites demonstrate good healing with appropriate scar formation     ROM:    AROM/PROM         Joint: Initial : 12/26/18  Re-Assess Date:  1/9/19  Re-Assess Date:  3/7/19    ACTIVE ROM (standing) RIGHT LEFT RIGHT LEFT RIGHT LEFT   Shoulder Flexion ---    122    Shoulder Abduction ---    114    Shoulder Internal Rotation (Apley's) ---    L2    Shoulder External Rotation (Apley's)     C7    Elbow ROM WNL        PASSIVE ROM (supine)         Shoulder Flexion 85 deg  120  151    Shoulder Abduction 85 deg  100  141    Shoulder Internal Rotation 50 deg @ 30 abd  55 @ 30 abd  54 @60 abd    Shoulder External Rotation 10 deg @ 30 abd  46 @ 30 abd  60 @60 abd    Cervical flexion/extension WFL        Cervical Rotation WFL                   Strength:  Not tested secondary to post operative status x6wk per protocol pt only able to perform AAROM  Joint: Initial  Date: 12/21/18  Re-Assess Date:  Re-Assess Date: 3/7/19     RIGHT LEFT RIGHT LEFT RIGHT LEFT   Shoulder Flexion ---  --  3+/5    Shoulder Abduction  (C5) ----  --  3/5    Shoulder Internal Rotation   --  4/5    Shoulder External Rotation   --  3+/5    Elbow Flexion  (C6)     4+/5    Elbow Extension (C7)         Wrist Flexion (C7)         Wrist Extension (C6)         Resisted Thumb Extension/Finger Abduction (C8/T1)         Resisted Cervical Rotation (C1):         Resisted Shoulder Shrug (C2, 3, 4):           Strength                                      Joint Mobility Eval Date: Not test due to post op status  Re-Assess Date: 1/9/19  Re-Assess Date:     RIGHT LEFT RIGHT LEFT RIGHT LEFT   Glenohumeral   Guarded and difficult to get end-feel  Good mobility, slightly guarded    Scapulothoracic         Thoracic              Special Tests:      Neurological Screen: Assessed @ Initial Visit    Radiating symptoms? No  Functional Mobility:  Assessed @ Initial Visit ---in brace no pillow per MD   Gait and transfers safe. ADLs independent   Balance: normal       Outcome Measure: Tool Used: Disabilities of the Arm, Shoulder and Hand (DASH) Questionnaire - Quick Version  Score:  Initial: 54/55  Most Recent: 20/55 (Date: 3-7-19 )   Interpretation of Score: The DASH is designed to measure the activities of daily living in person's with upper extremity dysfunction or pain. Each section is scored on a 1-5 scale, 5 representing the greatest disability. The scores of each section are added together for a total score of 55. Score 11 12-19 20-28 29-37 38-45 46-54 55   Modifier CH CI CJ CK CL CM CN     ?  Carrying, Moving, and Handling Objects:     - CURRENT STATUS: CJ - 20%-39% impaired, limited or restricted    - GOAL STATUS: CI - 1%-19% impaired, limited or restricted    - D/C STATUS:  ---------------To be determined---------------     TREATMENT:   (In addition to Assessment/Re-Assessment sessions the following treatments were rendered)  15 weeks post-operative   · Pre-Treatment Pain/ Symptoms: Jose Amador states \"doing good, but was sore after last session, but no pain today\"       THERAPEUTIC EXERCISE: (45 minutes):  Exercises per grid below to improve mobility, strength and balance. Required minimal visual and verbal cues to promote proper body alignment, promote proper body posture and promote proper body mechanics. Progressed resistance, range and repetitions as indicated.      Date:  12/21/18 Date:  12/26/18 Date  1/16/19 Date  2/27/19 Date  3/1/19 Date  3/5/19 Date  3/7/19 Date  3/12/19 Date  3/15/19 Date  3/18/19 Date  3/25/19 Date  3/27/19 Date  4/3/19 Date  4/4/19   Activity/Exercise Parameters Parameters               Education Healing process, HEP, POC, PT goals, protocol postioning out of sling as aswell as sleep positions      Re-assessment          Scapular retraction 5x5\"  X 10, 5 sec holds, seated    20x5\" holds red band 20x5\" holds red band 20x5\" GREEN BAND Low rows 7# each-focus scapular depression Low rows 10# each-focus scapular depression Low rows 10# each-focus scapular depression Low rows 13# each-focus scapular depression Green 20x5\" Blue 30x Blue 30x   Assisted pendulums 1 min                Elbow flexion/extension   Supine x 20                Forearm supination/pronation   X 10                Wrist flex/ext  X 10                Gripping   X 10               walkouts   10x10\" PROM  10x10\" 10x10\"     10x 10\" holds for plank progression 10x 10\" holds for plank progression 10x 10\" holds for plank progression With 20 shoulder taps 2x   doorway stretch              Gentle 1 min   Pulleys   6 mins 5 mins cool down    3 mins 4 mins 3 mins   3 mins 3 mins   Horizontal abd/add            10x yellow small ROM at 90 degrees abd 15x red small ROM at 90 degrees abd    UBE    3F/2B level 2 3F/3B level 2 3F/3B level 4 3f/3b LEVEL 2 3f/3b LEVEL 3 3F/3B level 3 3F/3B level 3 3F/3B level 4 3F/3B level 4 3F/3B level 5 2F/2B level 5   AAROM standing    Standing scaption/ER/IR 5x with 20\" end stretch; dowel mandy             Isometric IR/ER     And flex/ext with elbow bent and straight supine for muscle activation- 8mins (10-20\" holds) Red band 10\" holds Red band 10\" holds 5x each Yellow 15x3\" holds ER and 20x yellow with IR Yellow ER 4x30\" holds 15\" rest; Green 20x IR  10x10\" yellow ER/Red IR  Red [id number]\" holds ER and IR Red 4n02m23\" holds ER and IR   AROM    Supine, 30 degree incline, and seated upright; 10x5\" stretch each flexion for endurance training. - elbows bent for 30 degree position Supine, 30 degree incline, and seated upright; 10x5\" stretch each flexion for endurance training. - elbows bent for 30 degree position Standing with visual cues 20x Shoulder flexion 20x Wall slides with retraction-depression  Wall slides with retraction-depression 15x5\"; wall angels too difficult due to pain at 90 degress  Flexion with 1# weight 30x-cues for depression and retraction of scapula Flexion with 1# weight 30x-cues for depression and retraction of scapula Flexion with 1# weight 30x-cues for depression and retraction of scapula   SA punch     20x            Shoulder ext      Red band 20x5\" Green band 20x5\" Green band 20x5\" Green 20x5\"  Green 2x10 Green 20x5\" Blue 30x5\"    punch      Red band 20x Green 20x Green 20x   Green 20x Green 20x     scap retract with ER      Against wall 10x10\" Against wall 10x10\"           Shrugs         5# 20x 5# 20x 20x 8#      Prone I and T         20x5\" each 15x5\" each-manual cues       D2          AROM difficult with end range pain-decreasd pain post manual and manual resistance 15x supine Yellow band 20x-improved form today no pain      Body blade           4x20\"  2 mins 4x20\" 4x20\"   Plank holds              6x10\" on knees, cues for protraction   Bosu stabilization              2x10 for stabilization and SA strengthening   Pick-ups              15# both UE 10x2 sets   Wall pushup              Small range 20x   Throwing Yellow band 15x (IR @90degrees) Yellow band 15x (IR @90degrees) and 1# ball against wall for small ROM IR/ER at 90 degrees abd Red band 15x (IR @90degrees) and 1# ball against wall for small ROM IR/ER at 90 degrees abd 30x      Med[employer] Portal     Manual Therapy Interventions: (0 minutes): . Joint mobilization, Soft tissue mobilization and Manipulation was utilized and necessary because of the patient's restricted joint motion, painful spasm and restricted motion of soft tissue. Done to improve soft tissue tone and elasticity  as well as promote proper joint movement in order to improve overall movement quality  (Used abbreviations: MET - muscle energy technique; PNF - proprioceptive neuromuscular facilitation; NMR - neuromuscular re-education; AP - anterior to posterior; PA - posterior to anterior)   Patient responded well to all manual interventions listed in chart below with no significant increase in pain/symptoms. Improved ROM demonstrated following interventions and decrease in pain scale listed below.                Manual Intervention Date:  12/21/18 Date:  12/28/18 Date  1/2/19 Date  1/16/19 Date  1/21/19 Date  1/24/19 Date  1/28/19 Date  2/20/19 Date:  2/22/19 Date  2/27/19 Date  3/1/19 Date  3/7/19 Date  3/12/19 Date  3/15/19 Date  3/27/19   Soft tissue mobilization Joint Mobility Parameters Parameters                Cervical paraspinals, UT, levator scap  Strumming/release Strumming/passive UT stretch 3 mins Strumming/pec stretch Strumming/pec stretch Strumming/pec stretch/UT release Strumming/pec stretch/UT release strumming/pec stretch/deltoid strumming strumming/pec stretch/deltoid strumming  strumming/pec stretch/deltoid strumming strumming/pec stretch/deltoid strumming strumming/pec stretch/deltoid strumming strumming/pec stretch/deltoid strumming      upslides(cervical) gradeI-II massage                 PROM  All planes gh  X 10 all direction to tolerance X 10 all direction to tolerance All planes All planes All Planes All planes passive end stretch to tolerance All planes passive end stretch to tolerance; ER in full abductionas tolerated=full motion All planes passive end stretch to tolerance; ER in full abductionas tolerated=full motion All planes passive end stretch to tolerance; ER in full abductionas tolerated=full motion All planes passive end stretch to tolerance; ER in full abductionas tolerated=full motion All planes passive end stretch to tolerance; ER in full abductionas tolerated=full motion All planes passive end stretch to tolerance; ER in full abductionas tolerated=full motion All planes passive end stretch to tolerance; ER in full abductionas tolerated=full motion All planes passive end stretch to tolerance; ER in full abductionas tolerated=full motion   Gh distraction  Grade II  3 x 20 sec with oscillation  3 x 20 sec with oscillation  Grade III Grade III with oscillation Grade III oscillation quad 1-2 Grade III oscillation quad 1-2 Grade III oscillation quad 1-3 Grade III oscillation quad 1-3 Grade III oscillation quad 1-3 Grade III oscillation quad 1-3 Grade III oscillation quad 1-3  Grade III oscillations quad 1-3 Grade IV oscillations all quad; grade III-IV post/inf joint mobs   Shoulder quadrant  Grade II   3 mins 4 mins              Rhythmic stab    Manual isometric 5\" 10x IR/ER Sub max IR/ER supine 10x each 10\" holds Sub max IR/ER supine 10x each 10\" holds              scapulothoracic        Grade IV all directions            MODALITIES: (0 minutes):        *  Cold Pack Therapy in order to provide analgesia, relieve muscle spasm and reduce inflammation and edema. Location= R shoulder  Skin intact pre and post treatment with no adverse symptoms    Treatment/Session Assessment: Appropriate fatigue with exercises today. No significant increase in pain. Good response to progressed WB exercises today for stability strengthening. Modified planks added to HEP. · Post-Treatment Pain/ Symptoms: No c/o's  Pain= 0/10 ·   Compliance with Program/Exercises: Compliant  · Recommendations for next session: Continue with progression of strength and ROM exercises as tolerated per protocol.      Future Appointments   Date Time Provider Jade Harley   4/8/2019  1:45 PM [facility]   4/11/2019  2:30 PM [facility]       Total Treatment Duration: 45 min   PT Patient Time In/Time Out  Time In: 3710  Time Out: 7940    Valarie Mathur

## 2019-04-11 ENCOUNTER — HOSPITAL ENCOUNTER (OUTPATIENT)
Dept: PHYSICAL THERAPY | Age: 39
Discharge: HOME | End: 2019-04-11
Payer: OTHER MISCELLANEOUS

## 2019-04-11 PROCEDURE — 97110 THERAPEUTIC EXERCISES: CPT

## 2019-04-11 NOTE — PROGRESS NOTES
Herrera Velarde  : 1980      Payor: Everardo Bahena / Plan: [address] / Product Type: Workers Comp /    [address] at [address], [address], [facility], [id number] [place] [address] Road  [phone]   [phone]              OUTPATIENT PHYSICAL THERAPY:Daily Note and Recertification       ICD-10: Treatment Diagnosis:    Pain in right shoulder (M25.511)  Incomplete rotator cuff tear or rupture of right shoulder, not specified as traumatic (M75.111)  Impingement syndrome of right shoulder (M75.41)                Precautions/Allergies:   Patient has no known allergies. Fall Risk Score: 1 (? 5 = High Risk)  MD Orders: Eval and Treat  MEDICAL/REFERRING DIAGNOSIS:  Incomplete rotator cuff tear or rupture of right shoulder, not specified as traumatic [M75.111]  Impingement syndrome of right shoulder [M75.41]   DATE OF ONSET: 18  REFERRING PHYSICIAN: Josh Baker MD  RETURN PHYSICIAN APPOINTMENT: TBD by patient      Re-certification/eval/progress report:  Herrera Velarde is now approximately 16 weeks post-op RTC repair. Ruba Mario He has demonstrated significant increase in ROM and strength. Pt is currently light duty x 3 more weeks then to return full duty per MD by 19. Pt will continue to benefit from skilled physical therapy for manual therapeutic techniques (as appropriate), therapeutic exercises and activities, neuromuscular re-education, and comprehensive home exercises program to continue to address ongoing current impairments and functional limitations for safe return to full duty workload. Re-evaluation/Progress Report: Herrera Velarde is now approximately 10-11 weeks post-op R RTC repair. He has demonstrated significant increase in ROM and strength. Pt progressing well with protocol and has improved his functional mobility and returned to light-duty work.  Pt will continue to benefit from skilled physical therapy for manual therapeutic techniques (as appropriate), therapeutic exercises and activities, neuromuscular re-education, and comprehensive home exercises program to continue to address ongoing current impairments and functional limitations. Re-evaluation/Progress Report: Norma Joya has attended 10 physical therapy sessions including initial evaluation. He is approximately 6 weeks post operative RTC repair. He has demonstrated an increase in shoulder PROM and progressed to standing AAROM scaption per protocol. He continues to present with decreased strength and ROM, posture and functional mobility secondary to post-op status. Pt will continue to benefit from skilled physical therapy for manual therapeutic techniques (as appropriate), therapeutic exercises and activities, neuromuscular re-education, and comprehensive home exercises program to address current impairments and functional limitations. INITIAL ASSESSMENT:   Mr. Noble Morgan presents to physical therapy s/p R RTC repair on 12/14/18. He presents with decreased strength, posture, ROM, joint mobility, functional mobility. These S/S are consistent with R RTC repair, R shoulder pain and impingement syndrome of R shoulder. Patient will benefit from skilled physical therapy for manual therapeutic techniques (as appropriate), therapeutic exercises and activities, neuromuscular re-education, and comprehensive home exercises program to address current impairments and functional limitations. PROBLEM LIST (Impacting functional limitations):  1. Decreased Strength  2. Decreased ADL/Functional Activities  3. Increased Pain  4. Decreased Activity Tolerance  5. Increased Shortness of Breath  6. Decreased Flexibility/Joint Mobility   Interventions Planned:   1. Cold  2. Manual therapy  3. Therapeutic exercise and activity  4. Vasopuematic compression therapy  5. E-Stim  6. Neuromuscular re-education  7.  Lizbeth Germain TREATMENT PLAN:  Effective Dates: 12/21/18 TO 5/11/19. Frequency/Duration: 2 times a week for 30 more Days secondary to post-operative patient  GOALS: (Goals have been discussed and agreed upon with patient.)  SHORT-TERM FUNCTIONAL GOALS: Time Frame: 4 weeks  1. Elfredia Laughter will report <=5/10 pain with don/doffing clothing as well as minimal/no difficulty. (met 1/24/19)  2. Elfredia Laughter will demonstrate improvement in passive shoulder flexion to >100 degrees to increase UE function and participation in ADLs. (met 1.9.19)  3. Elfredia Laughter will demonstrate demonstrate improvement in passive shoulder abd to >100 degrees to increase UE function and participation in ADLs. (@100 degrees 1.9.19)  4. Elfredia Laughter will show a greater than 8 point decrease on the DASH in order to show an increase in upper extremity function. (met 1/24/19)  5. Elfredia Laughter will be independent in all HEP (met 1/24/19)    DISCHARGE GOALS: Time Frame: 12 weeks    1. Elfredia Laughter will show full AROM of the UE in order to return to full functional mobility   2. Elfredia Laughter will show a greater than 15 point decrease on the DASH in order to show an increase in upper extremity function (met 3/7/19)  3. Elfredia Laughter will report doing hair/bathing without difficulty and <=2/10 pain in order to be independent with ADL's  4. Elfredia Laughter will be independent in all advanced HEP   5. Elfredia Laughter will be able to demonstrate 5/5 UE strength for safe return to work. Rehabilitation Potential For Stated Goals: Good  Regarding Elfredia Laughter therapy, I certify that the treatment plan above will be carried out by a therapist or under their direction.   Thank you for this referral,  Marino Saenz     Referring Physician Signature: Autumn Dhaliwal MD          Date                      HISTORY:   History of Present Injury/Illness (Reason for Referral):  Eveline Grove presents s/p R RTC repair on 12/14/18. He states it is a work related injury when boxes fell on his shoulder. 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 Pt reports currently having most difficulty sleeping at night    -Present symptoms/complaints (on day of evaluation)  Pain Scale:  · Current: 10/10  · Best: 10/10  · Worst: 10/10    · Aggravating factors: sleeping, donning/doffing clothing, bathing   · Relieving factors: ice    Dominant Side  Right handed  Past Medical History/Comorbidities:   Mr. Lucia Riddle  has a past medical history of Chlamydia, Hiatal hernia, High risk sexual behavior, and Urethritis. He also has no past medical history of Adverse effect of anesthesia, Difficult intubation, Malignant hyperthermia due to anesthesia, Nausea & vomiting, or Pseudocholinesterase deficiency. Mr. Lucia Riddle  has a past surgical history that includes hx hernia repair and hx wisdom teeth extraction. Social History/Living Environment:     Lives with girlfriend   Prior Level of Function/Work/Activity:  Full time at [employer];    Current Medications:    Current Outpatient Medications:     meloxicam (MOBIC) 15 mg tablet, Take 15 mg by mouth daily. , Disp: , Rfl:     traMADol (ULTRAM) 50 mg tablet, Take 1 Tab by mouth every six (6) hours as needed for Pain.  Max Daily Amount: 200 mg., Disp: 24 Tab, Rfl: 0   - Oxycodone (per patient) prn 5mg tablet    Date Last Reviewed:  4/11/2019   EXAMINATION:   Observation/Orthostatic Postural Assessment:          Rounded shoulders  Palpation:          Minor tenderness still noted along UT and supraspinatus  Surgical sites demonstrate good healing with appropriate scar formation     ROM:    AROM/PROM         Joint: Initial : 12/26/18  Re-Assess Date:  1/9/19  Re-Assess Date:  4/11/19    ACTIVE ROM (standing) RIGHT LEFT RIGHT LEFT RIGHT LEFT   Shoulder Flexion ---    158    Shoulder Abduction ---    150 Shoulder Internal Rotation (Apley's) ---    T12    Shoulder External Rotation (Apley's)     T2    Elbow ROM WNL        PASSIVE ROM (supine)         Shoulder Flexion 85 deg  120      Shoulder Abduction 85 deg  100      Shoulder Internal Rotation 50 deg @ 30 abd  55 @ 30 abd      Shoulder External Rotation 10 deg @ 30 abd  46 @ 30 abd      Cervical flexion/extension WFL        Cervical Rotation WFL                   Strength:  Not tested secondary to post operative status x6wk per protocol pt only able to perform AAROM  Joint: Initial  Date: 12/21/18  Re-Assess Date:  Re-Assess Date: 3/7/19     RIGHT LEFT RIGHT LEFT RIGHT LEFT   Shoulder Flexion ---  --  4/5    Shoulder Abduction  (C5) ----  --  4/5    Shoulder Internal Rotation   --  4+/5    Shoulder External Rotation   --  4-/5    Elbow Flexion  (C6)     4+/5    Elbow Extension (C7)         Wrist Flexion (C7)         Wrist Extension (C6)         Resisted Thumb Extension/Finger Abduction (C8/T1)         Resisted Cervical Rotation (C1):         Resisted Shoulder Shrug (C2, 3, 4):           Strength                                      Joint Mobility Eval Date: Not test due to post op status  Re-Assess Date: 1/9/19  Re-Assess Date:     RIGHT LEFT RIGHT LEFT RIGHT LEFT   Glenohumeral   Guarded and difficult to get end-feel  Good mobility, slightly guarded    Scapulothoracic         Thoracic              Special Tests:      Neurological Screen: Assessed @ Initial Visit    Radiating symptoms? No  Functional Mobility:  Assessed rounded shoulders  Gait and transfers safe. ADLs independent   Balance: normal       Outcome Measure: Tool Used: Disabilities of the Arm, Shoulder and Hand (DASH) Questionnaire - Quick Version  Score:  Initial: 54/55  Most Recent: 20/55 (Date: 3-7-19 )   Interpretation of Score: The DASH is designed to measure the activities of daily living in person's with upper extremity dysfunction or pain.   Each section is scored on a 1-5 scale, 5 representing the greatest disability. The scores of each section are added together for a total score of 55. Score 11 12-19 20-28 29-37 38-45 46-54 55   Modifier CH CI CJ CK CL CM CN     ? Carrying, Moving, and Handling Objects:     - CURRENT STATUS: CJ - 20%-39% impaired, limited or restricted    - GOAL STATUS: CI - 1%-19% impaired, limited or restricted    - D/C STATUS:  ---------------To be determined---------------     TREATMENT:   (In addition to Assessment/Re-Assessment sessions the following treatments were rendered)  16 weeks post-operative   · Pre-Treatment Pain/ Symptoms: Stefany Spicer states \"doing good, new script from MD\"       THERAPEUTIC EXERCISE: (45 minutes):  Exercises per grid below to improve mobility, strength and balance. Required minimal visual and verbal cues to promote proper body alignment, promote proper body posture and promote proper body mechanics. Progressed resistance, range and repetitions as indicated.      Date:  12/21/18 Date:  12/26/18 Date  1/16/19 Date  2/27/19 Date  3/1/19 Date  3/5/19 Date  3/7/19 Date  3/12/19 Date  3/15/19 Date  3/18/19 Date  3/25/19 Date  3/27/19 Date  4/3/19 Date  4/4/19 Date  4/11/19   Activity/Exercise Parameters Parameters                Education Healing process, HEP, POC, PT goals, protocol postioning out of sling as aswell as sleep positions      Re-assessment           Scapular retraction 5x5\"  X 10, 5 sec holds, seated    20x5\" holds red band 20x5\" holds red band 20x5\" GREEN BAND Low rows 7# each-focus scapular depression Low rows 10# each-focus scapular depression Low rows 10# each-focus scapular depression Low rows 13# each-focus scapular depression Green 20x5\" Blue 30x Blue 30x    Assisted pendulums 1 min                 Elbow flexion/extension   Supine x 20                 Forearm supination/pronation   X 10                 Wrist flex/ext  X 10                 Gripping   X 10                walkouts   10x10\" PROM 10x10\" 10x10\"     10x 10\" holds for plank progression 10x 10\" holds for plank progression 10x 10\" holds for plank progression With 20 shoulder taps 2x    doorway stretch              Gentle 1 min    Pulleys   6 mins 5 mins cool down    3 mins 4 mins 3 mins   3 mins 3 mins 3 mins   Horizontal abd/add            10x yellow small ROM at 90 degrees abd 15x red small ROM at 90 degrees abd     UBE    3F/2B level 2 3F/3B level 2 3F/3B level 4 3f/3b LEVEL 2 3f/3b LEVEL 3 3F/3B level 3 3F/3B level 3 3F/3B level 4 3F/3B level 4 3F/3B level 5 2F/2B level 5 3F/3B level 10   AAROM standing    Standing scaption/ER/IR 5x with 20\" end stretch; dowel mandy              Isometric IR/ER     And flex/ext with elbow bent and straight supine for muscle activation- 8mins (10-20\" holds) Red band 10\" holds Red band 10\" holds 5x each Yellow 15x3\" holds ER and 20x yellow with IR Yellow ER 4x30\" holds 15\" rest; Green 20x IR  10x10\" yellow ER/Red IR  Red [id number]\" holds ER and IR Red [id number]\" holds ER and IR    AROM    Supine, 30 degree incline, and seated upright; 10x5\" stretch each flexion for endurance training. - elbows bent for 30 degree position Supine, 30 degree incline, and seated upright; 10x5\" stretch each flexion for endurance training. - elbows bent for 30 degree position Standing with visual cues 20x Shoulder flexion 20x Wall slides with retraction-depression  Wall slides with retraction-depression 15x5\"; wall angels too difficult due to pain at 90 degress  Flexion with 1# weight 30x-cues for depression and retraction of scapula Flexion with 1# weight 30x-cues for depression and retraction of scapula Flexion with 1# weight 30x-cues for depression and retraction of scapula    SA punch     20x             Shoulder ext      Red band 20x5\" Green band 20x5\" Green band 20x5\" Green 20x5\"  Green 2x10 Green 20x5\" Blue 30x5\"  13# 2x10   punch      Red band 20x Green 20x Green 20x   Green 20x Green 20x      scap retract with ER      Against wall 10x10\" Against wall 10x10\"            Shrugs         5# 20x 5# 20x 20x 8#    20x10#   Prone I and T         20x5\" each 15x5\" each-manual cues        D2          AROM difficult with end range pain-decreasd pain post manual and manual resistance 15x supine Yellow band 20x-improved form today no pain       Body blade           4x20\"  2 mins 4x20\" 4x20\" 4x20\"   Gerlandon Ruddle holds              6x10\" on knees, cues for protraction 4x20\" on knees, cues for protraction   Bosu stabilization              2x10 for stabilization and SA strengthening 2x20 for stabilization and SA strengthening   Pick-ups              15# both UE 10x2 sets 15# both UE 10x2 sets shoulder raise   Wall pushup              Small range 20x Small range 2x10 on raised table   Throwing            Yellow band 15x (IR @90degrees) Yellow band 15x (IR @90degrees) and 1# ball against wall for small ROM IR/ER at 90 degrees abd Red band 15x (IR @90degrees) and 1# ball against wall for small ROM IR/ER at 90 degrees abd 30x  NV   pulling               approx 150# around clinic on stool 150ft     [facility]     Manual Therapy Interventions: (0 minutes): . Joint mobilization, Soft tissue mobilization and Manipulation was utilized and necessary because of the patient's restricted joint motion, painful spasm and restricted motion of soft tissue. Done to improve soft tissue tone and elasticity  as well as promote proper joint movement in order to improve overall movement quality  (Used abbreviations: MET - muscle energy technique; PNF - proprioceptive neuromuscular facilitation; NMR - neuromuscular re-education; AP - anterior to posterior; PA - posterior to anterior)   Patient responded well to all manual interventions listed in chart below with no significant increase in pain/symptoms. Improved ROM demonstrated following interventions and decrease in pain scale listed below.                Manual Intervention Date:  12/21/18 Date:  12/28/18 Date  1/2/19 Date  1/16/19 Date  1/21/19 Date  1/24/19 Date  1/28/19 Date  2/20/19 Date:  2/22/19 Date  2/27/19 Date  3/1/19 Date  3/7/19 Date  3/12/19 Date  3/15/19 Date  3/27/19   Soft tissue mobilization Joint Mobility Parameters Parameters                Cervical paraspinals, UT, levator scap  Strumming/release Strumming/passive UT stretch 3 mins Strumming/pec stretch Strumming/pec stretch Strumming/pec stretch/UT release Strumming/pec stretch/UT release strumming/pec stretch/deltoid strumming strumming/pec stretch/deltoid strumming  strumming/pec stretch/deltoid strumming strumming/pec stretch/deltoid strumming strumming/pec stretch/deltoid strumming strumming/pec stretch/deltoid strumming      upslides(cervical) gradeI-II massage                 PROM  All planes gh  X 10 all direction to tolerance X 10 all direction to tolerance All planes All planes All Planes All planes passive end stretch to tolerance All planes passive end stretch to tolerance; ER in full abductionas tolerated=full motion All planes passive end stretch to tolerance; ER in full abductionas tolerated=full motion All planes passive end stretch to tolerance; ER in full abductionas tolerated=full motion All planes passive end stretch to tolerance; ER in full abductionas tolerated=full motion All planes passive end stretch to tolerance; ER in full abductionas tolerated=full motion All planes passive end stretch to tolerance; ER in full abductionas tolerated=full motion All planes passive end stretch to tolerance; ER in full abductionas tolerated=full motion All planes passive end stretch to tolerance; ER in full abductionas tolerated=full motion   Gh distraction  Grade II  3 x 20 sec with oscillation  3 x 20 sec with oscillation  Grade III Grade III with oscillation Grade III oscillation quad 1-2 Grade III oscillation quad 1-2 Grade III oscillation quad 1-3 Grade III oscillation quad 1-3 Grade III oscillation quad 1-3 Grade III oscillation quad 1-3 Grade III oscillation quad 1-3  Grade III oscillations quad 1-3 Grade IV oscillations all quad; grade III-IV post/inf joint mobs   Shoulder quadrant  Grade II   3 mins 4 mins              Rhythmic stab    Manual isometric 5\" 10x IR/ER Sub max IR/ER supine 10x each 10\" holds Sub max IR/ER supine 10x each 10\" holds              scapulothoracic        Grade IV all directions            MODALITIES: (0 minutes):        *  Cold Pack Therapy in order to provide analgesia, relieve muscle spasm and reduce inflammation and edema. Location= R shoulder  Skin intact pre and post treatment with no adverse symptoms    Treatment/Session Assessment: Appropriate fatigue with exercises today. See re-eval for full details. No increase in pain reported. · Post-Treatment Pain/ Symptoms: No c/o's  Pain= 0/10 ·   Compliance with Program/Exercises: Compliant  · Recommendations for next session: Continue with progression of strength and ROM exercises as tolerated per protocol.      Future Appointments   Date Time Provider Jade Harley   4/17/2019  3:15 PM Ray Simpson [facility]   4/19/2019  9:30 AM [facility]   4/24/2019  3:00 PM [facility]   4/26/2019 10:30 AM [facility]   5/1/2019  1:45 PM [facility]   5/3/2019  1:00 PM Ray [facility]       Total Treatment Duration: 45 min   PT Patient Time In/Time Out  Time In: 9310  Time Out: [address]

## 2019-04-17 ENCOUNTER — HOSPITAL ENCOUNTER (OUTPATIENT)
Dept: PHYSICAL THERAPY | Age: 39
Discharge: HOME | End: 2019-04-17
Payer: OTHER MISCELLANEOUS

## 2019-04-17 PROCEDURE — 97110 THERAPEUTIC EXERCISES: CPT

## 2019-04-17 NOTE — PROGRESS NOTES
Felipe Swift  : 1980      Payor: Vj Saunders / Plan: [address] / Product Type: Workers Comp /    [address] [facility], [address], [facility], [address] [place] [address]  [phone]   [phone]              OUTPATIENT PHYSICAL THERAPY:Daily Note 2019      ICD-10: Treatment Diagnosis:    Pain in right shoulder (M25.511)  Incomplete rotator cuff tear or rupture of right shoulder, not specified as traumatic (M75.111)  Impingement syndrome of right shoulder (M75.41)                Precautions/Allergies:   Patient has no known allergies. Fall Risk Score: 1 (? 5 = High Risk)  MD Orders: Eval and Treat  MEDICAL/REFERRING DIAGNOSIS:  Incomplete rotator cuff tear or rupture of right shoulder, not specified as traumatic [M75.111]  Impingement syndrome of right shoulder [M75.41]   DATE OF ONSET: 18  REFERRING PHYSICIAN: Arcelia Spring MD  RETURN PHYSICIAN APPOINTMENT: TBD by patient      Re-certification/eval/progress report:  Felipe Swift is now approximately 16 weeks post-op RTC repair. Michela Swain He has demonstrated significant increase in ROM and strength. Pt is currently light duty x 3 more weeks then to return full duty per MD by 19. Pt will continue to benefit from skilled physical therapy for manual therapeutic techniques (as appropriate), therapeutic exercises and activities, neuromuscular re-education, and comprehensive home exercises program to continue to address ongoing current impairments and functional limitations for safe return to full duty workload. Re-evaluation/Progress Report: Felipe Swift is now approximately 10-11 weeks post-op R RTC repair. He has demonstrated significant increase in ROM and strength. Pt progressing well with protocol and has improved his functional mobility and returned to light-duty work.  Pt will continue to benefit from skilled physical therapy for manual therapeutic techniques (as appropriate), therapeutic exercises and activities, neuromuscular re-education, and comprehensive home exercises program to continue to address ongoing current impairments and functional limitations. Re-evaluation/Progress Report: Mireya Membreno has attended 10 physical therapy sessions including initial evaluation. He is approximately 6 weeks post operative RTC repair. He has demonstrated an increase in shoulder PROM and progressed to standing AAROM scaption per protocol. He continues to present with decreased strength and ROM, posture and functional mobility secondary to post-op status. Pt will continue to benefit from skilled physical therapy for manual therapeutic techniques (as appropriate), therapeutic exercises and activities, neuromuscular re-education, and comprehensive home exercises program to address current impairments and functional limitations. INITIAL ASSESSMENT:   Mr. Niya Weber presents to physical therapy s/p R RTC repair on 12/14/18. He presents with decreased strength, posture, ROM, joint mobility, functional mobility. These S/S are consistent with R RTC repair, R shoulder pain and impingement syndrome of R shoulder. Patient will benefit from skilled physical therapy for manual therapeutic techniques (as appropriate), therapeutic exercises and activities, neuromuscular re-education, and comprehensive home exercises program to address current impairments and functional limitations. PROBLEM LIST (Impacting functional limitations):  1. Decreased Strength  2. Decreased ADL/Functional Activities  3. Increased Pain  4. Decreased Activity Tolerance  5. Increased Shortness of Breath  6. Decreased Flexibility/Joint Mobility   Interventions Planned:   1. Cold  2. Manual therapy  3. Therapeutic exercise and activity  4. Vasopuematic compression therapy  5. E-Stim  6. Neuromuscular re-education  7.  Education-HEP     TREATMENT PLAN:  Effective Dates: 12/21/18 TO 5/11/19. Frequency/Duration: 2 times a week for 30 more Days secondary to post-operative patient  GOALS: (Goals have been discussed and agreed upon with patient.)  SHORT-TERM FUNCTIONAL GOALS: Time Frame: 4 weeks  1. Felipe Swift will report <=5/10 pain with don/doffing clothing as well as minimal/no difficulty. (met 1/24/19)  2. Felipe Jamison will demonstrate improvement in passive shoulder flexion to >100 degrees to increase UE function and participation in ADLs. (met 1.9.19)  3. Felipe Jamison will demonstrate demonstrate improvement in passive shoulder abd to >100 degrees to increase UE function and participation in ADLs. (@100 degrees 1.9.19)  4. Felipe Jamison will show a greater than 8 point decrease on the DASH in order to show an increase in upper extremity function. (met 1/24/19)  5. Felipe Jamison will be independent in all HEP (met 1/24/19)    DISCHARGE GOALS: Time Frame: 12 weeks    1. Felipe Jamison will show full AROM of the UE in order to return to full functional mobility   2. Felipe Jamison will show a greater than 15 point decrease on the DASH in order to show an increase in upper extremity function (met 3/7/19)  3. Felipe Swift will report doing hair/bathing without difficulty and <=2/10 pain in order to be independent with ADL's  4. Felipe Jamison will be independent in all advanced HEP   5. Felipe Jamison will be able to demonstrate 5/5 UE strength for safe return to work. Rehabilitation Potential For Stated Goals: Good  Regarding Felipe Jamison therapy, I certify that the treatment plan above will be carried out by a therapist or under their direction.   Thank you for this referral,  Vik Urias     Referring Physician Signature: Arcelia Spring MD          Date                      HISTORY:   History of Present Injury/Illness (Reason for Referral):  Saad Kang Shantal Turner presents s/p R RTC repair on 12/14/18. He states it is a work related injury when boxes fell on his shoulder. 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 Pt reports currently having most difficulty sleeping at night    -Present symptoms/complaints (on day of evaluation)  Pain Scale:  · Current: 10/10  · Best: 10/10  · Worst: 10/10    · Aggravating factors: sleeping, donning/doffing clothing, bathing   · Relieving factors: ice    Dominant Side  Right handed  Past Medical History/Comorbidities:   Mr. Shantal Turner  has a past medical history of Chlamydia, Hiatal hernia, High risk sexual behavior, and Urethritis. He also has no past medical history of Adverse effect of anesthesia, Difficult intubation, Malignant hyperthermia due to anesthesia, Nausea & vomiting, or Pseudocholinesterase deficiency. Mr. Shantal Turner  has a past surgical history that includes hx hernia repair and hx wisdom teeth extraction. Social History/Living Environment:     Lives with girlfriend   Prior Level of Function/Work/Activity:  Full time at [employer];    Current Medications:    Current Outpatient Medications:     meloxicam (MOBIC) 15 mg tablet, Take 15 mg by mouth daily. , Disp: , Rfl:     traMADol (ULTRAM) 50 mg tablet, Take 1 Tab by mouth every six (6) hours as needed for Pain.  Max Daily Amount: 200 mg., Disp: 24 Tab, Rfl: 0   - Oxycodone (per patient) prn 5mg tablet    Date Last Reviewed:  4/17/2019   EXAMINATION:   Observation/Orthostatic Postural Assessment:          Rounded shoulders  Palpation:          Minor tenderness still noted along UT and supraspinatus  Surgical sites demonstrate good healing with appropriate scar formation     ROM:    AROM/PROM         Joint: Initial : 12/26/18  Re-Assess Date:  1/9/19  Re-Assess Date:  4/11/19    ACTIVE ROM (standing) RIGHT LEFT RIGHT LEFT RIGHT LEFT   Shoulder Flexion ---    158    Shoulder Abduction ---    150    Shoulder Internal Rotation (Apley's) ---    T12    Shoulder External Rotation (Apley's)     T2    Elbow ROM WNL        PASSIVE ROM (supine)         Shoulder Flexion 85 deg  120      Shoulder Abduction 85 deg  100      Shoulder Internal Rotation 50 deg @ 30 abd  55 @ 30 abd      Shoulder External Rotation 10 deg @ 30 abd  46 @ 30 abd      Cervical flexion/extension WFL        Cervical Rotation WFL                   Strength:  Not tested secondary to post operative status x6wk per protocol pt only able to perform AAROM  Joint: Initial  Date: 12/21/18  Re-Assess Date:  Re-Assess Date: 3/7/19     RIGHT LEFT RIGHT LEFT RIGHT LEFT   Shoulder Flexion ---  --  4/5    Shoulder Abduction  (C5) ----  --  4/5    Shoulder Internal Rotation   --  4+/5    Shoulder External Rotation   --  4-/5    Elbow Flexion  (C6)     4+/5    Elbow Extension (C7)         Wrist Flexion (C7)         Wrist Extension (C6)         Resisted Thumb Extension/Finger Abduction (C8/T1)         Resisted Cervical Rotation (C1):         Resisted Shoulder Shrug (C2, 3, 4):           Strength                                      Joint Mobility Eval Date: Not test due to post op status  Re-Assess Date: 1/9/19  Re-Assess Date:     RIGHT LEFT RIGHT LEFT RIGHT LEFT   Glenohumeral   Guarded and difficult to get end-feel  Good mobility, slightly guarded    Scapulothoracic         Thoracic              Special Tests:      Neurological Screen: Assessed @ Initial Visit    Radiating symptoms? No  Functional Mobility:  Assessed rounded shoulders  Gait and transfers safe. ADLs independent   Balance: normal       Outcome Measure: Tool Used: Disabilities of the Arm, Shoulder and Hand (DASH) Questionnaire - Quick Version  Score:  Initial: 54/55  Most Recent: 20/55 (Date: 3-7-19 )   Interpretation of Score: The DASH is designed to measure the activities of daily living in person's with upper extremity dysfunction or pain.   Each section is scored on a 1-5 scale, 5 representing the greatest disability. The scores of each section are added together for a total score of 55. Score 11 12-19 20-28 29-37 38-45 46-54 55   Modifier CH CI CJ CK CL CM CN     ? Carrying, Moving, and Handling Objects:     - CURRENT STATUS: CJ - 20%-39% impaired, limited or restricted    - GOAL STATUS: CI - 1%-19% impaired, limited or restricted    - D/C STATUS:  ---------------To be determined---------------     TREATMENT:   (In addition to Assessment/Re-Assessment sessions the following treatments were rendered)  16 weeks post-operative   · Pre-Treatment Pain/ Symptoms: Nichole Marking states \"doing good just hurts every now and then\"       THERAPEUTIC EXERCISE: (45 minutes):  Exercises per grid below to improve mobility, strength and balance. Required minimal visual and verbal cues to promote proper body alignment, promote proper body posture and promote proper body mechanics. Progressed resistance, range and repetitions as indicated.      Date:  12/21/18 Date:  12/26/18 Date  1/16/19 Date  3/12/19 Date  3/15/19 Date  3/18/19 Date  3/25/19 Date  3/27/19 Date  4/3/19 Date  4/4/19 Date  4/11/19 Date  4/17/19   Activity/Exercise Parameters Parameters             Education Healing process, HEP, POC, PT goals, protocol postioning out of sling as aswell as sleep positions              Scapular retraction 5x5\"  X 10, 5 sec holds, seated   Low rows 7# each-focus scapular depression Low rows 10# each-focus scapular depression Low rows 10# each-focus scapular depression Low rows 13# each-focus scapular depression Green 20x5\" Blue 30x Blue 30x  Blue 30x shoulder ext   walkouts   10x10\" PROM    10x 10\" holds for plank progression 10x 10\" holds for plank progression 10x 10\" holds for plank progression With 20 shoulder taps 2x     doorway stretch          Gentle 1 min     Pulleys   6 mins 3 mins 4 mins 3 mins   3 mins 3 mins 3 mins 5 mins   Horizontal abd/add        10x yellow small ROM at 90 degrees abd 15x red small ROM at 90 degrees abd      UBE    3f/3b LEVEL 3 3F/3B level 3 3F/3B level 3 3F/3B level 4 3F/3B level 4 3F/3B level 5 2F/2B level 5 3F/3B level 10 3F/3B level 10   AAROM standing               Isometric IR/ER    Yellow 15x3\" holds ER and 20x yellow with IR Yellow ER 4x30\" holds 15\" rest; Green 20x IR  10x10\" yellow ER/Red IR  Red [id number]\" holds ER and IR Red [id number]\" holds ER and IR     AROM    Wall slides with retraction-depression  Wall slides with retraction-depression 15x5\"; wall angels too difficult due to pain at 90 degress  Flexion with 1# weight 30x-cues for depression and retraction of scapula Flexion with 1# weight 30x-cues for depression and retraction of scapula Flexion with 1# weight 30x-cues for depression and retraction of scapula  Wall angles 2x15   SA punch               Shoulder ext    Green band 20x5\" Green 20x5\"  Green 2x10 Green 20x5\" Blue 30x5\"  13# 2x10    punch    Green 20x   Green 20x Green 20x       scap retract with ER               Shrugs     5# 20x 5# 20x 20x 8#    20x10#    Prone I and T     20x5\" each 15x5\" each-manual cues      20 4# I and 2# T on theraball   D2      AROM difficult with end range pain-decreasd pain post manual and manual resistance 15x supine Yellow band 20x-improved form today no pain        Body blade       4x20\"  2 mins 4x20\" 4x20\" 4x20\" 2x2 mins   Plank holds          6x10\" on knees, cues for protraction 4x20\" on knees, cues for protraction    Bosu stabilization          2x10 for stabilization and SA strengthening 2x20 for stabilization and SA strengthening 2x20 for stabilization and SA strengthening on knees   Pick-ups          15# both UE 10x2 sets 15# both UE 10x2 sets shoulder raise 25# both UE 10x2 sets shoulder raise   Wall pushup          Small range 20x Small range 2x10 on raised table 6x on table and shoulder taps 20x; and 2x15 on wall   Throwing        Yellow band 15x (IR @90degrees) Yellow band 15x (IR @90degrees) and 1# ball against wall for small ROM IR/ER at 90 degrees abd Red band 15x (IR @90degrees) and 1# ball against wall for small ROM IR/ER at 90 degrees abd 30x  NV    pulling           approx 150# around clinic on stool 150ft    Overhead press            2# 10x 2 sets                                                  [employer] [facility]     Manual Therapy Interventions: (0 minutes): . Joint mobilization, Soft tissue mobilization and Manipulation was utilized and necessary because of the patient's restricted joint motion, painful spasm and restricted motion of soft tissue. Done to improve soft tissue tone and elasticity  as well as promote proper joint movement in order to improve overall movement quality  (Used abbreviations: MET - muscle energy technique; PNF - proprioceptive neuromuscular facilitation; NMR - neuromuscular re-education; AP - anterior to posterior; PA - posterior to anterior)   Patient responded well to all manual interventions listed in chart below with no significant increase in pain/symptoms. Improved ROM demonstrated following interventions and decrease in pain scale listed below.                Manual Intervention Date:  12/21/18 Date:  12/28/18 Date  1/2/19 Date  1/16/19 Date  1/21/19 Date  1/24/19 Date  1/28/19 Date  2/20/19 Date:  2/22/19 Date  2/27/19 Date  3/1/19 Date  3/7/19 Date  3/12/19 Date  3/15/19 Date  3/27/19   Soft tissue mobilization Joint Mobility Parameters Parameters                Cervical paraspinals, UT, levator scap  Strumming/release Strumming/passive UT stretch 3 mins Strumming/pec stretch Strumming/pec stretch Strumming/pec stretch/UT release Strumming/pec stretch/UT release strumming/pec stretch/deltoid strumming strumming/pec stretch/deltoid strumming  strumming/pec stretch/deltoid strumming strumming/pec stretch/deltoid strumming strumming/pec stretch/deltoid strumming strumming/pec stretch/deltoid strumming      upslides(cervical) gradeI-II massage                 PROM  All planes gh  X 10 all direction to tolerance X 10 all direction to tolerance All planes All planes All Planes All planes passive end stretch to tolerance All planes passive end stretch to tolerance; ER in full abductionas tolerated=full motion All planes passive end stretch to tolerance; ER in full abductionas tolerated=full motion All planes passive end stretch to tolerance; ER in full abductionas tolerated=full motion All planes passive end stretch to tolerance; ER in full abductionas tolerated=full motion All planes passive end stretch to tolerance; ER in full abductionas tolerated=full motion All planes passive end stretch to tolerance; ER in full abductionas tolerated=full motion All planes passive end stretch to tolerance; ER in full abductionas tolerated=full motion All planes passive end stretch to tolerance; ER in full abductionas tolerated=full motion   Gh distraction  Grade II  3 x 20 sec with oscillation  3 x 20 sec with oscillation  Grade III Grade III with oscillation Grade III oscillation quad 1-2 Grade III oscillation quad 1-2 Grade III oscillation quad 1-3 Grade III oscillation quad 1-3 Grade III oscillation quad 1-3 Grade III oscillation quad 1-3 Grade III oscillation quad 1-3  Grade III oscillations quad 1-3 Grade IV oscillations all quad; grade III-IV post/inf joint mobs   Shoulder quadrant  Grade II   3 mins 4 mins              Rhythmic stab    Manual isometric 5\" 10x IR/ER Sub max IR/ER supine 10x each 10\" holds Sub max IR/ER supine 10x each 10\" holds              scapulothoracic        Grade IV all directions            MODALITIES: (0 minutes):        *  Cold Pack Therapy in order to provide analgesia, relieve muscle spasm and reduce inflammation and edema. Location= R shoulder  Skin intact pre and post treatment with no adverse symptoms    Treatment/Session Assessment: Appropriate fatigue with exercises today. Minor increase in pain, resolved post stretching exercises.  Pt re-educated on HEP and importance of carry over for strengthening. · Post-Treatment Pain/ Symptoms: No c/o's  Pain= 0/10 ·   Compliance with Program/Exercises: Compliant  · Recommendations for next session: Continue with progression of strength and ROM exercises as tolerated per protocol.      Future Appointments   Date Time Provider Jade Harley   4/19/2019  9:30 AM Julio LUIS [facility]   4/24/2019  3:00 PM CHAY NewellOSQUENTIN [facility]NI[facility]   4/26/2019 10:30 AM CHAY NewellOSRPT [facility]NIUM   5/1/2019  1:45 PM Julio VILLALTA [facility]   5/3/2019  1:00 PM Bell Andres [facility]       Total Treatment Duration: 45 min   PT Patient Time In/Time Out  Time In: 1530  Time Out: 243 Isai Leos

## 2019-04-24 ENCOUNTER — HOSPITAL ENCOUNTER (OUTPATIENT)
Dept: PHYSICAL THERAPY | Age: 39
Discharge: HOME | End: 2019-04-24
Payer: OTHER MISCELLANEOUS

## 2019-04-24 PROCEDURE — 97110 THERAPEUTIC EXERCISES: CPT

## 2019-04-24 NOTE — PROGRESS NOTES
Felipe Swift  : 1980      Payor: Vj Saunders / Plan: [address] / Product Type: Workers Comp /    [facility] at [address] [place] [address], [address], [facility], [address] [place] [address]  [phone]   [phone]              OUTPATIENT PHYSICAL THERAPY:Daily Note 2019      ICD-10: Treatment Diagnosis:    Pain in right shoulder (M25.511)  Incomplete rotator cuff tear or rupture of right shoulder, not specified as traumatic (M75.111)  Impingement syndrome of right shoulder (M75.41)                Precautions/Allergies:   Patient has no known allergies. Fall Risk Score: 1 (? 5 = High Risk)  MD Orders: Eval and Treat  MEDICAL/REFERRING DIAGNOSIS:  Incomplete rotator cuff tear or rupture of right shoulder, not specified as traumatic [M75.111]  Impingement syndrome of right shoulder [M75.41]   DATE OF ONSET: 18  REFERRING PHYSICIAN: Arcelia Spring MD  RETURN PHYSICIAN APPOINTMENT: TBD by patient      Re-certification/eval/progress report:  Felipe Swift is now approximately 16 weeks post-op RTC repair. Michela Swain He has demonstrated significant increase in ROM and strength. Pt is currently light duty x 3 more weeks then to return full duty per MD by 19. Pt will continue to benefit from skilled physical therapy for manual therapeutic techniques (as appropriate), therapeutic exercises and activities, neuromuscular re-education, and comprehensive home exercises program to continue to address ongoing current impairments and functional limitations for safe return to full duty workload. Re-evaluation/Progress Report: Felipe Swift is now approximately 10-11 weeks post-op R RTC repair. He has demonstrated significant increase in ROM and strength. Pt progressing well with protocol and has improved his functional mobility and returned to light-duty work.  Pt will continue to benefit from skilled physical therapy for manual therapeutic techniques (as appropriate), therapeutic exercises and activities, neuromuscular re-education, and comprehensive home exercises program to continue to address ongoing current impairments and functional limitations. Re-evaluation/Progress Report: Jt Loyd has attended 10 physical therapy sessions including initial evaluation. He is approximately 6 weeks post operative RTC repair. He has demonstrated an increase in shoulder PROM and progressed to standing AAROM scaption per protocol. He continues to present with decreased strength and ROM, posture and functional mobility secondary to post-op status. Pt will continue to benefit from skilled physical therapy for manual therapeutic techniques (as appropriate), therapeutic exercises and activities, neuromuscular re-education, and comprehensive home exercises program to address current impairments and functional limitations. INITIAL ASSESSMENT:   Mr. Payton Dandy presents to physical therapy s/p R RTC repair on 12/14/18. He presents with decreased strength, posture, ROM, joint mobility, functional mobility. These S/S are consistent with R RTC repair, R shoulder pain and impingement syndrome of R shoulder. Patient will benefit from skilled physical therapy for manual therapeutic techniques (as appropriate), therapeutic exercises and activities, neuromuscular re-education, and comprehensive home exercises program to address current impairments and functional limitations. PROBLEM LIST (Impacting functional limitations):  1. Decreased Strength  2. Decreased ADL/Functional Activities  3. Increased Pain  4. Decreased Activity Tolerance  5. Increased Shortness of Breath  6. Decreased Flexibility/Joint Mobility   Interventions Planned:   1. Cold  2. Manual therapy  3. Therapeutic exercise and activity  4. Vasopuematic compression therapy  5. E-Stim  6. Neuromuscular re-education  7.  Education-HEP     TREATMENT PLAN:  Effective Dates: 12/21/18 TO 5/11/19. Frequency/Duration: 2 times a week for 30 more Days secondary to post-operative patient  GOALS: (Goals have been discussed and agreed upon with patient.)  SHORT-TERM FUNCTIONAL GOALS: Time Frame: 4 weeks  1. Sandhya Leonard will report <=5/10 pain with don/doffing clothing as well as minimal/no difficulty. (met 1/24/19)  2. Sandhya Trevonk will demonstrate improvement in passive shoulder flexion to >100 degrees to increase UE function and participation in ADLs. (met 1.9.19)  3. Sandhya Junk will demonstrate demonstrate improvement in passive shoulder abd to >100 degrees to increase UE function and participation in ADLs. (@100 degrees 1.9.19)  4. Sandhya Junk will show a greater than 8 point decrease on the DASH in order to show an increase in upper extremity function. (met 1/24/19)  5. Sandhya Leonard will be independent in all HEP (met 1/24/19)    DISCHARGE GOALS: Time Frame: 12 weeks    1. Sandhya Lesterk will show full AROM of the UE in order to return to full functional mobility   2. Sandhya Junk will show a greater than 15 point decrease on the DASH in order to show an increase in upper extremity function (met 3/7/19)  3. Sandhya Leonard will report doing hair/bathing without difficulty and <=2/10 pain in order to be independent with ADL's  4. Sandhya Lesterk will be independent in all advanced HEP   5. Sandhya Leonard will be able to demonstrate 5/5 UE strength for safe return to work. Rehabilitation Potential For Stated Goals: Good  Regarding Sandhya Leonard therapy, I certify that the treatment plan above will be carried out by a therapist or under their direction.   Thank you for this referral,  Jackelyn Contreras DPT     Referring Physician Signature: Delores Jacobson MD          Date                      HISTORY:   History of Present Injury/Illness (Reason for Referral):  Floresita Ascencio Aleksandar Rosario presents s/p R RTC repair on 12/14/18. He states it is a work related injury when boxes fell on his shoulder. 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 Pt reports currently having most difficulty sleeping at night    -Present symptoms/complaints (on day of evaluation)  Pain Scale:  · Current: 10/10  · Best: 10/10  · Worst: 10/10    · Aggravating factors: sleeping, donning/doffing clothing, bathing   · Relieving factors: ice    Dominant Side  Right handed  Past Medical History/Comorbidities:   Mr. Payton Dandy  has a past medical history of Chlamydia, Hiatal hernia, High risk sexual behavior, and Urethritis. He also has no past medical history of Adverse effect of anesthesia, Difficult intubation, Malignant hyperthermia due to anesthesia, Nausea & vomiting, or Pseudocholinesterase deficiency. Mr. Payton Dandy  has a past surgical history that includes hx hernia repair and hx wisdom teeth extraction. Social History/Living Environment:     Lives with girlfriend   Prior Level of Function/Work/Activity:  Full time at [employer];    Current Medications:    Current Outpatient Medications:     meloxicam (MOBIC) 15 mg tablet, Take 15 mg by mouth daily. , Disp: , Rfl:     traMADol (ULTRAM) 50 mg tablet, Take 1 Tab by mouth every six (6) hours as needed for Pain.  Max Daily Amount: 200 mg., Disp: 24 Tab, Rfl: 0   - Oxycodone (per patient) prn 5mg tablet    Date Last Reviewed:  4/24/2019   EXAMINATION:   Observation/Orthostatic Postural Assessment:          Rounded shoulders  Palpation:          Minor tenderness still noted along UT and supraspinatus  Surgical sites demonstrate good healing with appropriate scar formation     ROM:    AROM/PROM         Joint: Initial : 12/26/18  Re-Assess Date:  1/9/19  Re-Assess Date:  4/11/19    ACTIVE ROM (standing) RIGHT LEFT RIGHT LEFT RIGHT LEFT   Shoulder Flexion ---    158    Shoulder Abduction ---    150    Shoulder Internal Rotation (Apley's) ---    T12    Shoulder External Rotation (Apley's)     T2    Elbow ROM WNL        PASSIVE ROM (supine)         Shoulder Flexion 85 deg  120      Shoulder Abduction 85 deg  100      Shoulder Internal Rotation 50 deg @ 30 abd  55 @ 30 abd      Shoulder External Rotation 10 deg @ 30 abd  46 @ 30 abd      Cervical flexion/extension WFL        Cervical Rotation WFL                   Strength:  Not tested secondary to post operative status x6wk per protocol pt only able to perform AAROM  Joint: Initial  Date: 12/21/18  Re-Assess Date:  Re-Assess Date: 3/7/19     RIGHT LEFT RIGHT LEFT RIGHT LEFT   Shoulder Flexion ---  --  4/5    Shoulder Abduction  (C5) ----  --  4/5    Shoulder Internal Rotation   --  4+/5    Shoulder External Rotation   --  4-/5    Elbow Flexion  (C6)     4+/5    Elbow Extension (C7)         Wrist Flexion (C7)         Wrist Extension (C6)         Resisted Thumb Extension/Finger Abduction (C8/T1)         Resisted Cervical Rotation (C1):         Resisted Shoulder Shrug (C2, 3, 4):           Strength                                      Joint Mobility Eval Date: Not test due to post op status  Re-Assess Date: 1/9/19  Re-Assess Date:     RIGHT LEFT RIGHT LEFT RIGHT LEFT   Glenohumeral   Guarded and difficult to get end-feel  Good mobility, slightly guarded    Scapulothoracic         Thoracic              Special Tests:      Neurological Screen: Assessed @ Initial Visit    Radiating symptoms? No  Functional Mobility:  Assessed rounded shoulders  Gait and transfers safe. ADLs independent   Balance: normal       Outcome Measure: Tool Used: Disabilities of the Arm, Shoulder and Hand (DASH) Questionnaire - Quick Version  Score:  Initial: 54/55  Most Recent: 20/55 (Date: 3-7-19 )   Interpretation of Score: The DASH is designed to measure the activities of daily living in person's with upper extremity dysfunction or pain.   Each section is scored on a 1-5 scale, 5 representing the greatest disability. The scores of each section are added together for a total score of 55. Score 11 12-19 20-28 29-37 38-45 46-54 55   Modifier CH CI CJ CK CL CM CN     ? Carrying, Moving, and Handling Objects:     - CURRENT STATUS: CJ - 20%-39% impaired, limited or restricted    - GOAL STATUS: CI - 1%-19% impaired, limited or restricted    - D/C STATUS:  ---------------To be determined---------------     TREATMENT:   (In addition to Assessment/Re-Assessment sessions the following treatments were rendered)  16 weeks post-operative   · Pre-Treatment Pain/ Symptoms: Emigdio Bledsoe states \"nah I'm doing good. \"  0/10 pain. THERAPEUTIC EXERCISE: (40 minutes):  Exercises per grid below to improve mobility, strength and balance. Required minimal visual and verbal cues to promote proper body alignment, promote proper body posture and promote proper body mechanics. Progressed resistance, range and repetitions as indicated.      Date:  12/21/18 Date:  12/26/18 Date  1/16/19 Date  3/12/19 Date  3/15/19 Date  3/18/19 Date  3/25/19 Date  3/27/19 Date  4/3/19 Date  4/4/19 Date  4/11/19 Date  4/17/19 Date:  4/24/19   Activity/Exercise Parameters Parameters              Education Healing process, HEP, POC, PT goals, protocol postioning out of sling as aswell as sleep positions               Scapular retraction 5x5\"  X 10, 5 sec holds, seated   Low rows 7# each-focus scapular depression Low rows 10# each-focus scapular depression Low rows 10# each-focus scapular depression Low rows 13# each-focus scapular depression Green 20x5\" Blue 30x Blue 30x  Blue 30x shoulder ext Blue 30x shoulder ext    walkouts   10x10\" PROM    10x 10\" holds for plank progression 10x 10\" holds for plank progression 10x 10\" holds for plank progression With 20 shoulder taps 2x      doorway stretch          Gentle 1 min      Pulleys   6 mins 3 mins 4 mins 3 mins   3 mins 3 mins 3 mins 5 mins 3 min   Horizontal abd/add        10x yellow small ROM at 90 degrees abd 15x red small ROM at 90 degrees abd       UBE    3f/3b LEVEL 3 3F/3B level 3 3F/3B level 3 3F/3B level 4 3F/3B level 4 3F/3B level 5 2F/2B level 5 3F/3B level 10 3F/3B level 10 3F/3B level 10   AAROM standing                Isometric IR/ER    Yellow 15x3\" holds ER and 20x yellow with IR Yellow ER 4x30\" holds 15\" rest; Green 20x IR  10x10\" yellow ER/Red IR  Red [id number]\" holds ER and IR Red [id number]\" holds ER and IR      AROM    Wall slides with retraction-depression  Wall slides with retraction-depression 15x5\"; wall angels too difficult due to pain at 90 degress  Flexion with 1# weight 30x-cues for depression and retraction of scapula Flexion with 1# weight 30x-cues for depression and retraction of scapula Flexion with 1# weight 30x-cues for depression and retraction of scapula  Wall angles 2x15 Wall angles 2x15   SA punch                Shoulder ext    Green band 20x5\" Green 20x5\"  Green 2x10 Green 20x5\" Blue 30x5\"  13# 2x10     punch    Green 20x   Green 20x Green 20x        scap retract with ER                Shrugs     5# 20x 5# 20x 20x 8#    20x10#     Prone I and T     20x5\" each 15x5\" each-manual cues      20 4# I and 2# T on theraball 20 4# I and 2# T on theraball   D2      AROM difficult with end range pain-decreasd pain post manual and manual resistance 15x supine Yellow band 20x-improved form today no pain         Body blade       4x20\"  2 mins 4x20\" 4x20\" 4x20\" 2x2 mins 4x30\"   Lenn Shark holds          6x10\" on knees, cues for protraction 4x20\" on knees, cues for protraction     Bosu stabilization          2x10 for stabilization and SA strengthening 2x20 for stabilization and SA strengthening 2x20 for stabilization and SA strengthening on knees About 10x2 for stabilization and SA strengthening in plank   Pick-ups          15# both UE 10x2 sets 15# both UE 10x2 sets shoulder raise 25# both UE 10x2 sets shoulder raise 25# both UE 10x2 sets shoulder raise   Wall pushup          Small range 20x Small range 2x10 on raised table 6x on table and shoulder taps 20x; and 2x15 on wall . Pushups on mat: 10x2    Taps 2x10   Throwing        Yellow band 15x (IR @90degrees) Yellow band 15x (IR @90degrees) and 1# ball against wall for small ROM IR/ER at 90 degrees abd Red band 15x (IR @90degrees) and 1# ball against wall for small ROM IR/ER at 90 degrees abd 30x  NV     pulling           approx 150# around clinic on stool 150ft     Overhead press            2# 10x 2 sets 2# 10x 2 sets                                                     [employer] Portal     Manual Therapy Interventions: (0 minutes): . Joint mobilization, Soft tissue mobilization and Manipulation was utilized and necessary because of the patient's restricted joint motion, painful spasm and restricted motion of soft tissue. Done to improve soft tissue tone and elasticity  as well as promote proper joint movement in order to improve overall movement quality  (Used abbreviations: MET - muscle energy technique; PNF - proprioceptive neuromuscular facilitation; NMR - neuromuscular re-education; AP - anterior to posterior; PA - posterior to anterior)   Patient responded well to all manual interventions listed in chart below with no significant increase in pain/symptoms. Improved ROM demonstrated following interventions and decrease in pain scale listed below.                Manual Intervention Date:  12/21/18 Date:  12/28/18 Date  1/2/19 Date  1/16/19 Date  1/21/19 Date  1/24/19 Date  1/28/19 Date  2/20/19 Date:  2/22/19 Date  2/27/19 Date  3/1/19 Date  3/7/19 Date  3/12/19 Date  3/15/19 Date  3/27/19   Soft tissue mobilization Joint Mobility Parameters Parameters                Cervical paraspinals, UT, levator scap  Strumming/release Strumming/passive UT stretch 3 mins Strumming/pec stretch Strumming/pec stretch Strumming/pec stretch/UT release Strumming/pec stretch/UT release strumming/pec stretch/deltoid strumming strumming/pec stretch/deltoid strumming  strumming/pec stretch/deltoid strumming strumming/pec stretch/deltoid strumming strumming/pec stretch/deltoid strumming strumming/pec stretch/deltoid strumming      upslides(cervical) gradeI-II massage                 PROM  All planes gh  X 10 all direction to tolerance X 10 all direction to tolerance All planes All planes All Planes All planes passive end stretch to tolerance All planes passive end stretch to tolerance; ER in full abductionas tolerated=full motion All planes passive end stretch to tolerance; ER in full abductionas tolerated=full motion All planes passive end stretch to tolerance; ER in full abductionas tolerated=full motion All planes passive end stretch to tolerance; ER in full abductionas tolerated=full motion All planes passive end stretch to tolerance; ER in full abductionas tolerated=full motion All planes passive end stretch to tolerance; ER in full abductionas tolerated=full motion All planes passive end stretch to tolerance; ER in full abductionas tolerated=full motion All planes passive end stretch to tolerance; ER in full abductionas tolerated=full motion   Gh distraction  Grade II  3 x 20 sec with oscillation  3 x 20 sec with oscillation  Grade III Grade III with oscillation Grade III oscillation quad 1-2 Grade III oscillation quad 1-2 Grade III oscillation quad 1-3 Grade III oscillation quad 1-3 Grade III oscillation quad 1-3 Grade III oscillation quad 1-3 Grade III oscillation quad 1-3  Grade III oscillations quad 1-3 Grade IV oscillations all quad; grade III-IV post/inf joint mobs   Shoulder quadrant  Grade II   3 mins 4 mins              Rhythmic stab    Manual isometric 5\" 10x IR/ER Sub max IR/ER supine 10x each 10\" holds Sub max IR/ER supine 10x each 10\" holds              scapulothoracic        Grade IV all directions            MODALITIES: (0 minutes):        *  Cold Pack Therapy in order to provide analgesia, relieve muscle spasm and reduce inflammation and edema. Location= R shoulder  Skin intact pre and post treatment with no adverse symptoms    Treatment/Session Assessment: Overall he is doing well per primary therapist's POC. He requires some cuing but is on track towards goal completion. Progressed to plank with BOSU. · Post-Treatment Pain/ Symptoms: No c/o's  Pain= 0/10 ·   Compliance with Program/Exercises: Compliant  · Recommendations for next session: Continue with progression of strength and ROM exercises as tolerated per protocol.      Future Appointments   Date Time Provider Jade Harley   4/26/2019 10:30 AM Bg Rod DPT [facility]   5/1/2019  1:45 PM Radha VILLALTA [facility]   5/3/2019  1:00 PM Giorgi Cervantes [facility]       Total Treatment Duration: 40 min   PT Patient Time In/Time Out  Time In: 9783  Time Out: [address] [place] [address] Mona Briggs DPT

## 2019-05-03 ENCOUNTER — HOSPITAL ENCOUNTER (OUTPATIENT)
Dept: PHYSICAL THERAPY | Age: 39
Discharge: HOME | End: 2019-05-03
Payer: OTHER MISCELLANEOUS

## 2019-05-03 PROCEDURE — 97110 THERAPEUTIC EXERCISES: CPT

## 2019-05-03 NOTE — PROGRESS NOTES
Monster Bowen  : 1980      Payor: Sushil Jimers / Plan: [address] / Product Type: Workers Comp /    [address] at [address], [address], [facility], [address] [place] [address]  [phone]   [phone]              OUTPATIENT PHYSICAL THERAPY:Daily Note 5/3/2019      ICD-10: Treatment Diagnosis:    Pain in right shoulder (M25.511)  Incomplete rotator cuff tear or rupture of right shoulder, not specified as traumatic (M75.111)  Impingement syndrome of right shoulder (M75.41)                Precautions/Allergies:   Patient has no known allergies. Fall Risk Score: 1 (? 5 = High Risk)  MD Orders: Eval and Treat  MEDICAL/REFERRING DIAGNOSIS:  Incomplete rotator cuff tear or rupture of right shoulder, not specified as traumatic [M75.111]  Impingement syndrome of right shoulder [M75.41]   DATE OF ONSET: 18  REFERRING PHYSICIAN: Sally Sheehan MD  RETURN PHYSICIAN APPOINTMENT: TBD by patient      Re-certification/eval/progress report:  Monster Bowen is now approximately 16 weeks post-op RTC repair. Ginny Pierce He has demonstrated significant increase in ROM and strength. Pt is currently light duty x 3 more weeks then to return full duty per MD by 19. Pt will continue to benefit from skilled physical therapy for manual therapeutic techniques (as appropriate), therapeutic exercises and activities, neuromuscular re-education, and comprehensive home exercises program to continue to address ongoing current impairments and functional limitations for safe return to full duty workload. Re-evaluation/Progress Report: Monster Bowen is now approximately 10-11 weeks post-op R RTC repair. He has demonstrated significant increase in ROM and strength. Pt progressing well with protocol and has improved his functional mobility and returned to light-duty work.  Pt will continue to benefit from skilled physical therapy for manual therapeutic techniques (as appropriate), therapeutic exercises and activities, neuromuscular re-education, and comprehensive home exercises program to continue to address ongoing current impairments and functional limitations. Re-evaluation/Progress Report: Elliott Manriquez has attended 10 physical therapy sessions including initial evaluation. He is approximately 6 weeks post operative RTC repair. He has demonstrated an increase in shoulder PROM and progressed to standing AAROM scaption per protocol. He continues to present with decreased strength and ROM, posture and functional mobility secondary to post-op status. Pt will continue to benefit from skilled physical therapy for manual therapeutic techniques (as appropriate), therapeutic exercises and activities, neuromuscular re-education, and comprehensive home exercises program to address current impairments and functional limitations. INITIAL ASSESSMENT:   Mr. Sarita Jansen presents to physical therapy s/p R RTC repair on 12/14/18. He presents with decreased strength, posture, ROM, joint mobility, functional mobility. These S/S are consistent with R RTC repair, R shoulder pain and impingement syndrome of R shoulder. Patient will benefit from skilled physical therapy for manual therapeutic techniques (as appropriate), therapeutic exercises and activities, neuromuscular re-education, and comprehensive home exercises program to address current impairments and functional limitations. PROBLEM LIST (Impacting functional limitations):  1. Decreased Strength  2. Decreased ADL/Functional Activities  3. Increased Pain  4. Decreased Activity Tolerance  5. Increased Shortness of Breath  6. Decreased Flexibility/Joint Mobility   Interventions Planned:   1. Cold  2. Manual therapy  3. Therapeutic exercise and activity  4. Vasopuematic compression therapy  5. E-Stim  6. Neuromuscular re-education  7.  Education-HEP     TREATMENT PLAN:  Effective Dates: 12/21/18 TO 5/11/19. Frequency/Duration: 2 times a week for 30 more Days secondary to post-operative patient  GOALS: (Goals have been discussed and agreed upon with patient.)  SHORT-TERM FUNCTIONAL GOALS: Time Frame: 4 weeks  1. Sandhya Leonard will report <=5/10 pain with don/doffing clothing as well as minimal/no difficulty. (met 1/24/19)  2. Sandhya Trevonk will demonstrate improvement in passive shoulder flexion to >100 degrees to increase UE function and participation in ADLs. (met 1.9.19)  3. Sandhya Junk will demonstrate demonstrate improvement in passive shoulder abd to >100 degrees to increase UE function and participation in ADLs. (@100 degrees 1.9.19)  4. Sandhya Junk will show a greater than 8 point decrease on the DASH in order to show an increase in upper extremity function. (met 1/24/19)  5. Sandhya Leonard will be independent in all HEP (met 1/24/19)    DISCHARGE GOALS: Time Frame: 12 weeks    1. Sandhya Lesterk will show full AROM of the UE in order to return to full functional mobility   2. Sandhya Junk will show a greater than 15 point decrease on the DASH in order to show an increase in upper extremity function (met 3/7/19)  3. Sandhya Leonard will report doing hair/bathing without difficulty and <=2/10 pain in order to be independent with ADL's  4. Sandhya Lesterk will be independent in all advanced HEP   5. Sandhya Leonard will be able to demonstrate 5/5 UE strength for safe return to work. Rehabilitation Potential For Stated Goals: Good  Regarding Sandhya Leonard therapy, I certify that the treatment plan above will be carried out by a therapist or under their direction.   Thank you for this referral,  Jackelyn Contreras DPT     Referring Physician Signature: Delores Jacobson MD          Date                      HISTORY:   History of Present Injury/Illness (Reason for Referral):  Floresita Ascencio Mane Speaks presents s/p R RTC repair on 12/14/18. He states it is a work related injury when boxes fell on his shoulder. 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 Pt reports currently having most difficulty sleeping at night    -Present symptoms/complaints (on day of evaluation)  Pain Scale:  · Current: 10/10  · Best: 10/10  · Worst: 10/10    · Aggravating factors: sleeping, donning/doffing clothing, bathing   · Relieving factors: ice    Dominant Side  Right handed  Past Medical History/Comorbidities:   Mr. Amanda Gonzales  has a past medical history of Chlamydia, Hiatal hernia, High risk sexual behavior, and Urethritis. He also has no past medical history of Adverse effect of anesthesia, Difficult intubation, Malignant hyperthermia due to anesthesia, Nausea & vomiting, or Pseudocholinesterase deficiency. Mr. Amanda Gonzales  has a past surgical history that includes hx hernia repair and hx wisdom teeth extraction. Social History/Living Environment:     Lives with girlfriend   Prior Level of Function/Work/Activity:  Full time at [employer];    Current Medications:    Current Outpatient Medications:     meloxicam (MOBIC) 15 mg tablet, Take 15 mg by mouth daily. , Disp: , Rfl:     traMADol (ULTRAM) 50 mg tablet, Take 1 Tab by mouth every six (6) hours as needed for Pain.  Max Daily Amount: 200 mg., Disp: 24 Tab, Rfl: 0   - Oxycodone (per patient) prn 5mg tablet    Date Last Reviewed:  5/3/2019   EXAMINATION:   Observation/Orthostatic Postural Assessment:          Rounded shoulders  Palpation:          Minor tenderness still noted along UT and supraspinatus  Surgical sites demonstrate good healing with appropriate scar formation     ROM:    AROM/PROM         Joint: Initial : 12/26/18  Re-Assess Date:  1/9/19  Re-Assess Date:  4/11/19    ACTIVE ROM (standing) RIGHT LEFT RIGHT LEFT RIGHT LEFT   Shoulder Flexion ---    158    Shoulder Abduction ---    150    Shoulder Internal Rotation (Apley's) ---    T12    Shoulder External Rotation (Apley's)     T2    Elbow ROM WNL        PASSIVE ROM (supine)         Shoulder Flexion 85 deg  120      Shoulder Abduction 85 deg  100      Shoulder Internal Rotation 50 deg @ 30 abd  55 @ 30 abd      Shoulder External Rotation 10 deg @ 30 abd  46 @ 30 abd      Cervical flexion/extension WFL        Cervical Rotation WFL                   Strength:  Not tested secondary to post operative status x6wk per protocol pt only able to perform AAROM  Joint: Initial  Date: 12/21/18  Re-Assess Date:  Re-Assess Date: 3/7/19     RIGHT LEFT RIGHT LEFT RIGHT LEFT   Shoulder Flexion ---  --  4/5    Shoulder Abduction  (C5) ----  --  4/5    Shoulder Internal Rotation   --  4+/5    Shoulder External Rotation   --  4-/5    Elbow Flexion  (C6)     4+/5    Elbow Extension (C7)         Wrist Flexion (C7)         Wrist Extension (C6)         Resisted Thumb Extension/Finger Abduction (C8/T1)         Resisted Cervical Rotation (C1):         Resisted Shoulder Shrug (C2, 3, 4):           Strength                                      Joint Mobility Eval Date: Not test due to post op status  Re-Assess Date: 1/9/19  Re-Assess Date:     RIGHT LEFT RIGHT LEFT RIGHT LEFT   Glenohumeral   Guarded and difficult to get end-feel  Good mobility, slightly guarded    Scapulothoracic         Thoracic              Special Tests:      Neurological Screen: Assessed @ Initial Visit    Radiating symptoms? No  Functional Mobility:  Assessed rounded shoulders  Gait and transfers safe. ADLs independent   Balance: normal       Outcome Measure: Tool Used: Disabilities of the Arm, Shoulder and Hand (DASH) Questionnaire - Quick Version  Score:  Initial: 54/55  Most Recent: 20/55 (Date: 3-7-19 )   Interpretation of Score: The DASH is designed to measure the activities of daily living in person's with upper extremity dysfunction or pain.   Each section is scored on a 1-5 scale, 5 representing the greatest disability. The scores of each section are added together for a total score of 55. Score 11 12-19 20-28 29-37 38-45 46-54 55   Modifier CH CI CJ CK CL CM CN     ? Carrying, Moving, and Handling Objects:     - CURRENT STATUS: CJ - 20%-39% impaired, limited or restricted    - GOAL STATUS: CI - 1%-19% impaired, limited or restricted    - D/C STATUS:  ---------------To be determined---------------     TREATMENT:   (In addition to Assessment/Re-Assessment sessions the following treatments were rendered)  16 weeks post-operative   · Pre-Treatment Pain/ Symptoms: Rory Teran states \"nah I'm doing good. \"  0/10 pain. THERAPEUTIC EXERCISE: (40 minutes):  Exercises per grid below to improve mobility, strength and balance. Required minimal visual and verbal cues to promote proper body alignment, promote proper body posture and promote proper body mechanics. Progressed resistance, range and repetitions as indicated.      Date:  12/21/18 Date:  12/26/18 Date  1/16/19 Date  3/12/19 Date  3/15/19 Date  3/18/19 Date  3/25/19 Date  3/27/19 Date  4/3/19 Date  4/4/19 Date  4/11/19 Date  4/17/19 Date:  4/24/19 Date:  5.3.19   Activity/Exercise Parameters Parameters               Education Healing process, HEP, POC, PT goals, protocol postioning out of sling as aswell as sleep positions                Scapular retraction 5x5\"  X 10, 5 sec holds, seated   Low rows 7# each-focus scapular depression Low rows 10# each-focus scapular depression Low rows 10# each-focus scapular depression Low rows 13# each-focus scapular depression Green 20x5\" Blue 30x Blue 30x  Blue 30x shoulder ext Blue 30x shoulder ext      13# rows   walkouts   10x10\" PROM    10x 10\" holds for plank progression 10x 10\" holds for plank progression 10x 10\" holds for plank progression With 20 shoulder taps 2x       doorway stretch          Gentle 1 min       Pulleys   6 mins 3 mins 4 mins 3 mins   3 mins 3 mins 3 mins 5 mins 3 min 3 minutes   Horizontal abd/add        10x yellow small ROM at 90 degrees abd 15x red small ROM at 90 degrees abd        UBE    3f/3b LEVEL 3 3F/3B level 3 3F/3B level 3 3F/3B level 4 3F/3B level 4 3F/3B level 5 2F/2B level 5 3F/3B level 10 3F/3B level 10 3F/3B level 10 3F/3B level 10   AAROM standing                 Isometric IR/ER    Yellow 15x3\" holds ER and 20x yellow with IR Yellow ER 4x30\" holds 15\" rest; Green 20x IR  10x10\" yellow ER/Red IR  Red [id number]\" holds ER and IR Red [id number]\" holds ER and IR       AROM    Wall slides with retraction-depression  Wall slides with retraction-depression 15x5\"; wall angels too difficult due to pain at 90 degress  Flexion with 1# weight 30x-cues for depression and retraction of scapula Flexion with 1# weight 30x-cues for depression and retraction of scapula Flexion with 1# weight 30x-cues for depression and retraction of scapula  Wall angles 2x15 Wall angles 2x15 Wall angles 2x15   SA punch                 Shoulder ext    Green band 20x5\" Green 20x5\"  Green 2x10 Green 20x5\" Blue 30x5\"  13# 2x10   *13# 2x10 extension   punch    Green 20x   Green 20x Green 20x         scap retract with ER                 Shrugs     5# 20x 5# 20x 20x 8#    20x10#      Prone I and T     20x5\" each 15x5\" each-manual cues      20 4# I and 2# T on theraball 20 4# I and 2# T on theraball 20 4# I and 4# T on theraball   D2      AROM difficult with end range pain-decreasd pain post manual and manual resistance 15x supine Yellow band 20x-improved form today no pain          Body blade       4x20\"  2 mins 4x20\" 4x20\" 4x20\" 2x2 mins 4x30\" 4x30\"   Corrine Fountain holds          6x10\" on knees, cues for protraction 4x20\" on knees, cues for protraction      Bosu stabilization          2x10 for stabilization and SA strengthening 2x20 for stabilization and SA strengthening 2x20 for stabilization and SA strengthening on knees About 10x2 for stabilization and SA strengthening in plank About 10x2 for stabilization and SA strengthening in plank   Pick-ups          15# both UE 10x2 sets 15# both UE 10x2 sets shoulder raise 25# both UE 10x2 sets shoulder raise 25# both UE 10x2 sets shoulder raise 30# both UE 10x2 sets shoulder raise   Wall pushup          Small range 20x Small range 2x10 on raised table 6x on table and shoulder taps 20x; and 2x15 on wall . Pushups on mat: 10x2    Taps 2x10 Pushups on mat: 10x2     Throwing        Yellow band 15x (IR @90degrees) Yellow band 15x (IR @90degrees) and 1# ball against wall for small ROM IR/ER at 90 degrees abd Red band 15x (IR @90degrees) and 1# ball against wall for small ROM IR/ER at 90 degrees abd 30x  NV      pulling           approx 150# around clinic on stool 150ft      Overhead press            2# 10x 2 sets 2# 10x 2 sets 2# 10x 2 sets                                                        [employer] Portal     Manual Therapy Interventions: (0 minutes): . Joint mobilization, Soft tissue mobilization and Manipulation was utilized and necessary because of the patient's restricted joint motion, painful spasm and restricted motion of soft tissue. Done to improve soft tissue tone and elasticity  as well as promote proper joint movement in order to improve overall movement quality  (Used abbreviations: MET - muscle energy technique; PNF - proprioceptive neuromuscular facilitation; NMR - neuromuscular re-education; AP - anterior to posterior; PA - posterior to anterior)   Patient responded well to all manual interventions listed in chart below with no significant increase in pain/symptoms. Improved ROM demonstrated following interventions and decrease in pain scale listed below.                Manual Intervention Date:  12/21/18 Date:  12/28/18 Date  1/2/19 Date  1/16/19 Date  1/21/19 Date  1/24/19 Date  1/28/19 Date  2/20/19 Date:  2/22/19 Date  2/27/19 Date  3/1/19 Date  3/7/19 Date  3/12/19 Date  3/15/19 Date  3/27/19   Soft tissue mobilization Joint Mobility Parameters Parameters Cervical paraspinals, UT, levator scap  Strumming/release Strumming/passive UT stretch 3 mins Strumming/pec stretch Strumming/pec stretch Strumming/pec stretch/UT release Strumming/pec stretch/UT release strumming/pec stretch/deltoid strumming strumming/pec stretch/deltoid strumming  strumming/pec stretch/deltoid strumming strumming/pec stretch/deltoid strumming strumming/pec stretch/deltoid strumming strumming/pec stretch/deltoid strumming      upslides(cervical) gradeI-II massage                 PROM  All planes gh  X 10 all direction to tolerance X 10 all direction to tolerance All planes All planes All Planes All planes passive end stretch to tolerance All planes passive end stretch to tolerance; ER in full abductionas tolerated=full motion All planes passive end stretch to tolerance; ER in full abductionas tolerated=full motion All planes passive end stretch to tolerance; ER in full abductionas tolerated=full motion All planes passive end stretch to tolerance; ER in full abductionas tolerated=full motion All planes passive end stretch to tolerance; ER in full abductionas tolerated=full motion All planes passive end stretch to tolerance; ER in full abductionas tolerated=full motion All planes passive end stretch to tolerance; ER in full abductionas tolerated=full motion All planes passive end stretch to tolerance; ER in full abductionas tolerated=full motion   Gh distraction  Grade II  3 x 20 sec with oscillation  3 x 20 sec with oscillation  Grade III Grade III with oscillation Grade III oscillation quad 1-2 Grade III oscillation quad 1-2 Grade III oscillation quad 1-3 Grade III oscillation quad 1-3 Grade III oscillation quad 1-3 Grade III oscillation quad 1-3 Grade III oscillation quad 1-3  Grade III oscillations quad 1-3 Grade IV oscillations all quad; grade III-IV post/inf joint mobs   Shoulder quadrant  Grade II   3 mins 4 mins              Rhythmic stab    Manual isometric 5\" 10x IR/ER Sub max IR/ER supine 10x each 10\" holds Sub max IR/ER supine 10x each 10\" holds              scapulothoracic        Grade IV all directions            MODALITIES: (0 minutes):        *  Cold Pack Therapy in order to provide analgesia, relieve muscle spasm and reduce inflammation and edema. Location= R shoulder  Skin intact pre and post treatment with no adverse symptoms    Treatment/Session Assessment: Overall he is doing well per primary therapist's POC. Anticipating DC next session. · Post-Treatment Pain/ Symptoms: No c/o's  Pain= 0/10 ·   Compliance with Program/Exercises: Compliant  · Recommendations for next session: Continue with progression of strength and ROM exercises as tolerated per protocol.      Future Appointments   Date Time Provider Jade Harley   5/8/2019  1:45 PM [facility]       Total Treatment Duration: 40 min   PT Patient Time In/Time Out  Time In: 5069  Time Out: 2261 ALEXIS AriasT

## 2019-06-12 NOTE — THERAPY DISCHARGE
Lena Qureshi  : 1980      Payor: Cristopher Park / Plan: [address] / Product Type: Workers Comp /    [address] [facility] at [address], [address], [address]  [phone]   [phone]              OUTPATIENT PHYSICAL THERAPY:Discontinuation Summary 2019      ICD-10: Treatment Diagnosis:    Pain in right shoulder (M25.511)  Incomplete rotator cuff tear or rupture of right shoulder, not specified as traumatic (M75.111)  Impingement syndrome of right shoulder (M75.41)                Precautions/Allergies:   Patient has no known allergies. Fall Risk Score: 1 (? 5 = High Risk)  MD Orders: Eval and Treat  MEDICAL/REFERRING DIAGNOSIS:  Incomplete rotator cuff tear or rupture of right shoulder, not specified as traumatic [M75.111]  Impingement syndrome of right shoulder [M75.41]   DATE OF ONSET: 18  REFERRING PHYSICIAN: Hilaria Priest MD  RETURN PHYSICIAN APPOINTMENT: TBD by patient      Lena Qureshi has been seen in physical therapy from 18 to 5/3/19 for 29 visits. Treatment has been discontinued at this time due to patient failing to return for additional treatment. The below goals were met prior to discontinuation. Some goals were not met due to pt failing to return to PT. Thank you for this referral.           Re-certification/eval/progress report:  Lena Qureshi is now approximately 16 weeks post-op RTC repair. Jojo Salazar He has demonstrated significant increase in ROM and strength. Pt is currently light duty x 3 more weeks then to return full duty per MD by 19.  Pt will continue to benefit from skilled physical therapy for manual therapeutic techniques (as appropriate), therapeutic exercises and activities, neuromuscular re-education, and comprehensive home exercises program to continue to address ongoing current impairments and functional limitations for safe return to full duty workload. Re-evaluation/Progress Report: Adebayo Childers is now approximately 10-11 weeks post-op R RTC repair. He has demonstrated significant increase in ROM and strength. Pt progressing well with protocol and has improved his functional mobility and returned to light-duty work. Pt will continue to benefit from skilled physical therapy for manual therapeutic techniques (as appropriate), therapeutic exercises and activities, neuromuscular re-education, and comprehensive home exercises program to continue to address ongoing current impairments and functional limitations. Re-evaluation/Progress Report: Adebayo Childers has attended 10 physical therapy sessions including initial evaluation. He is approximately 6 weeks post operative RTC repair. He has demonstrated an increase in shoulder PROM and progressed to standing AAROM scaption per protocol. He continues to present with decreased strength and ROM, posture and functional mobility secondary to post-op status. Pt will continue to benefit from skilled physical therapy for manual therapeutic techniques (as appropriate), therapeutic exercises and activities, neuromuscular re-education, and comprehensive home exercises program to address current impairments and functional limitations. INITIAL ASSESSMENT:   Mr. Carlin Randle presents to physical therapy s/p R RTC repair on 12/14/18. He presents with decreased strength, posture, ROM, joint mobility, functional mobility. These S/S are consistent with R RTC repair, R shoulder pain and impingement syndrome of R shoulder. Patient will benefit from skilled physical therapy for manual therapeutic techniques (as appropriate), therapeutic exercises and activities, neuromuscular re-education, and comprehensive home exercises program to address current impairments and functional limitations. TREATMENT PLAN:  Effective Dates: 12/21/18 TO 5/11/19.   Frequency/Duration: 2 times a week for 30 more Days secondary to post-operative patient  GOALS: (Goals have been discussed and agreed upon with patient.)  SHORT-TERM FUNCTIONAL GOALS: Time Frame: 4 weeks  1. Lena Qureshi will report <=5/10 pain with don/doffing clothing as well as minimal/no difficulty. (met 1/24/19)  2. Lena Qureshi will demonstrate improvement in passive shoulder flexion to >100 degrees to increase UE function and participation in ADLs. (met 1.9.19)  3. Lena Qureshi will demonstrate demonstrate improvement in passive shoulder abd to >100 degrees to increase UE function and participation in ADLs. (@100 degrees 1.9.19)  4. Lena Qureshi will show a greater than 8 point decrease on the DASH in order to show an increase in upper extremity function. (met 1/24/19)  5. Lena Qureshi will be independent in all HEP (met 1/24/19)    DISCHARGE GOALS: Time Frame: 12 weeks    1. Lena Qureshi will show full AROM of the UE in order to return to full functional mobility   2. Lena Qureshi will show a greater than 15 point decrease on the DASH in order to show an increase in upper extremity function (met 3/7/19)  3. Lena Qureshi will report doing hair/bathing without difficulty and <=2/10 pain in order to be independent with ADL's  4. Lena Qureshi will be independent in all advanced HEP   5. Lena Qureshi will be able to demonstrate 5/5 UE strength for safe return to work.                         Joan Giron PT DPT

## (undated) DEVICE — TRUEPASS DISPOSABLE NEEDLES 5 PER BOX: Brand: TRUEPASS

## (undated) DEVICE — 3M™ IOBAN™ 2 ANTIMICROBIAL INCISE DRAPE 6650EZ: Brand: IOBAN™ 2

## (undated) DEVICE — CANNULA THREADED FLEX 6.5 X 72MM: Brand: CLEAR-TRAC

## (undated) DEVICE — MASTISOL ADHESIVE LIQ 2/3ML

## (undated) DEVICE — CLEAR-TRAC 7.0 MM X 72 MM THREADED                                    CANNULA, WITH DISPOSABLE OBTURATOR,                                    GREY, STERILE: Brand: CLEAR-TRAC

## (undated) DEVICE — [AGGRESSIVE 6-FLUTE BARREL BUR, ARTHROSCOPIC SHAVER BLADE,  DO NOT RESTERILIZE,  DO NOT USE IF PACKAGE IS DAMAGED,  KEEP DRY,  KEEP AWAY FROM SUNLIGHT]: Brand: FORMULA

## (undated) DEVICE — 90-S ACCELERATOR, SUCTION PROBE, NON-BENDABLE, MAX CUT LEVEL 11: Brand: SERFAS ENERGY

## (undated) DEVICE — SOLUTION IRRIG 3000ML 0.9% SOD CHL FLX CONT 0797208] ICU MEDICAL INC]

## (undated) DEVICE — SUTURE MCRYL SZ 3-0 L27IN ABSRB UD L19MM PS-2 3/8 CIR PRIM Y427H

## (undated) DEVICE — Device

## (undated) DEVICE — KENDALL SCD EXPRESS SLEEVES, KNEE LENGTH, LARGE: Brand: KENDALL SCD

## (undated) DEVICE — SURGICAL PROCEDURE PACK BASIC ST FRANCIS

## (undated) DEVICE — (D)PREP SKN CHLRAPRP APPL 26ML -- CONVERT TO ITEM 371833

## (undated) DEVICE — SYR 50ML LR LCK 1ML GRAD NSAF --

## (undated) DEVICE — INFLOW CASSETTE TUBING, DO NOT USE IF PACKAGE IS DAMAGED: Brand: CROSSFLOW

## (undated) DEVICE — NDL SPNE QNCKE 18GX3.5IN LF --

## (undated) DEVICE — 4-PORT MANIFOLD: Brand: NEPTUNE 2

## (undated) DEVICE — PACK,SHOULDER,DRAPE,POUCH: Brand: MEDLINE

## (undated) DEVICE — ULTRATAPE 2MM BLUE 38 PKG OF 6

## (undated) DEVICE — BLADE SHV CUT MENIS AGG + 4MM --

## (undated) DEVICE — (D)STRIP SKN CLSR 0.5X4IN WHT --

## (undated) DEVICE — ULTRATAPE SUTURE COBRAID BLUE, 6                                    PER BOX: Brand: ULTRATAPE

## (undated) DEVICE — SOFT SILICONE HYDROCELLULAR FOAM DRESSING WITH LOCK AWAY LAYER: Brand: ALLEVYN LIFE XL 21X21 CTN10

## (undated) DEVICE — OUTFLOW CASSETTE TUBING, DO NOT USE IF PACKAGE IS DAMAGED: Brand: CROSSFLOW

## (undated) DEVICE — INTENDED FOR TISSUE SEPARATION, AND OTHER PROCEDURES THAT REQUIRE A SHARP SURGICAL BLADE TO PUNCTURE OR CUT.: Brand: BARD-PARKER ® STAINLESS STEEL BLADES